# Patient Record
Sex: MALE | Race: BLACK OR AFRICAN AMERICAN | NOT HISPANIC OR LATINO | URBAN - METROPOLITAN AREA
[De-identification: names, ages, dates, MRNs, and addresses within clinical notes are randomized per-mention and may not be internally consistent; named-entity substitution may affect disease eponyms.]

---

## 2021-12-10 ENCOUNTER — EMERGENCY (EMERGENCY)
Facility: HOSPITAL | Age: 66
LOS: 1 days | Discharge: HOME | End: 2021-12-10
Attending: EMERGENCY MEDICINE | Admitting: EMERGENCY MEDICINE
Payer: MEDICAID

## 2021-12-10 VITALS
OXYGEN SATURATION: 99 % | DIASTOLIC BLOOD PRESSURE: 84 MMHG | SYSTOLIC BLOOD PRESSURE: 127 MMHG | TEMPERATURE: 99 F | RESPIRATION RATE: 17 BRPM | HEART RATE: 82 BPM

## 2021-12-10 VITALS
RESPIRATION RATE: 16 BRPM | SYSTOLIC BLOOD PRESSURE: 107 MMHG | DIASTOLIC BLOOD PRESSURE: 66 MMHG | HEART RATE: 106 BPM | TEMPERATURE: 98 F

## 2021-12-10 LAB
ALBUMIN SERPL ELPH-MCNC: 2.8 G/DL — LOW (ref 3.3–5)
ALP SERPL-CCNC: 108 U/L — SIGNIFICANT CHANGE UP (ref 40–120)
ALT FLD-CCNC: 71 U/L — HIGH (ref 4–41)
ANION GAP SERPL CALC-SCNC: 9 MMOL/L — SIGNIFICANT CHANGE UP (ref 7–14)
APPEARANCE UR: ABNORMAL
AST SERPL-CCNC: 27 U/L — SIGNIFICANT CHANGE UP (ref 4–40)
BACTERIA # UR AUTO: ABNORMAL
BASE EXCESS BLDV CALC-SCNC: 4 MMOL/L — HIGH (ref -2–3)
BASOPHILS # BLD AUTO: 0.01 K/UL — SIGNIFICANT CHANGE UP (ref 0–0.2)
BASOPHILS NFR BLD AUTO: 0.1 % — SIGNIFICANT CHANGE UP (ref 0–2)
BILIRUB SERPL-MCNC: 0.4 MG/DL — SIGNIFICANT CHANGE UP (ref 0.2–1.2)
BILIRUB UR-MCNC: NEGATIVE — SIGNIFICANT CHANGE UP
BLOOD GAS VENOUS COMPREHENSIVE RESULT: SIGNIFICANT CHANGE UP
BUN SERPL-MCNC: 19 MG/DL — SIGNIFICANT CHANGE UP (ref 7–23)
CALCIUM SERPL-MCNC: 8.8 MG/DL — SIGNIFICANT CHANGE UP (ref 8.4–10.5)
CHLORIDE BLDV-SCNC: 104 MMOL/L — SIGNIFICANT CHANGE UP (ref 96–108)
CHLORIDE SERPL-SCNC: 102 MMOL/L — SIGNIFICANT CHANGE UP (ref 98–107)
CO2 BLDV-SCNC: 33.6 MMOL/L — HIGH (ref 22–26)
CO2 SERPL-SCNC: 29 MMOL/L — SIGNIFICANT CHANGE UP (ref 22–31)
COLOR SPEC: YELLOW — SIGNIFICANT CHANGE UP
CREAT SERPL-MCNC: 0.73 MG/DL — SIGNIFICANT CHANGE UP (ref 0.5–1.3)
DIFF PNL FLD: NEGATIVE — SIGNIFICANT CHANGE UP
EOSINOPHIL # BLD AUTO: 0.25 K/UL — SIGNIFICANT CHANGE UP (ref 0–0.5)
EOSINOPHIL NFR BLD AUTO: 2 % — SIGNIFICANT CHANGE UP (ref 0–6)
GAS PNL BLDV: 137 MMOL/L — SIGNIFICANT CHANGE UP (ref 136–145)
GLUCOSE BLDV-MCNC: 154 MG/DL — HIGH (ref 70–99)
GLUCOSE SERPL-MCNC: 159 MG/DL — HIGH (ref 70–99)
GLUCOSE UR QL: ABNORMAL
HCO3 BLDV-SCNC: 32 MMOL/L — HIGH (ref 22–29)
HCT VFR BLD CALC: 36.8 % — LOW (ref 39–50)
HCT VFR BLDA CALC: 35 % — LOW (ref 39–51)
HGB BLD CALC-MCNC: 11.5 G/DL — LOW (ref 13–17)
HGB BLD-MCNC: 11.7 G/DL — LOW (ref 13–17)
IANC: 9.63 K/UL — HIGH (ref 1.5–8.5)
IMM GRANULOCYTES NFR BLD AUTO: 0.5 % — SIGNIFICANT CHANGE UP (ref 0–1.5)
KETONES UR-MCNC: NEGATIVE — SIGNIFICANT CHANGE UP
LACTATE BLDV-MCNC: 3.4 MMOL/L — HIGH (ref 0.5–2)
LEUKOCYTE ESTERASE UR-ACNC: ABNORMAL
LYMPHOCYTES # BLD AUTO: 1.3 K/UL — SIGNIFICANT CHANGE UP (ref 1–3.3)
LYMPHOCYTES # BLD AUTO: 10.6 % — LOW (ref 13–44)
MCHC RBC-ENTMCNC: 29.7 PG — SIGNIFICANT CHANGE UP (ref 27–34)
MCHC RBC-ENTMCNC: 31.8 GM/DL — LOW (ref 32–36)
MCV RBC AUTO: 93.4 FL — SIGNIFICANT CHANGE UP (ref 80–100)
MONOCYTES # BLD AUTO: 1.04 K/UL — HIGH (ref 0–0.9)
MONOCYTES NFR BLD AUTO: 8.5 % — SIGNIFICANT CHANGE UP (ref 2–14)
NEUTROPHILS # BLD AUTO: 9.63 K/UL — HIGH (ref 1.8–7.4)
NEUTROPHILS NFR BLD AUTO: 78.3 % — HIGH (ref 43–77)
NITRITE UR-MCNC: NEGATIVE — SIGNIFICANT CHANGE UP
NRBC # BLD: 0 /100 WBCS — SIGNIFICANT CHANGE UP
NRBC # FLD: 0 K/UL — SIGNIFICANT CHANGE UP
PCO2 BLDV: 63 MMHG — HIGH (ref 42–55)
PH BLDV: 7.31 — LOW (ref 7.32–7.43)
PH UR: 7 — SIGNIFICANT CHANGE UP (ref 5–8)
PLATELET # BLD AUTO: 280 K/UL — SIGNIFICANT CHANGE UP (ref 150–400)
PO2 BLDV: 23 MMHG — SIGNIFICANT CHANGE UP
POTASSIUM BLDV-SCNC: 4.4 MMOL/L — SIGNIFICANT CHANGE UP (ref 3.5–5.1)
POTASSIUM SERPL-MCNC: 4.9 MMOL/L — SIGNIFICANT CHANGE UP (ref 3.5–5.3)
POTASSIUM SERPL-SCNC: 4.9 MMOL/L — SIGNIFICANT CHANGE UP (ref 3.5–5.3)
PROT SERPL-MCNC: 6.5 G/DL — SIGNIFICANT CHANGE UP (ref 6–8.3)
PROT UR-MCNC: ABNORMAL
RBC # BLD: 3.94 M/UL — LOW (ref 4.2–5.8)
RBC # FLD: 15.4 % — HIGH (ref 10.3–14.5)
RBC CASTS # UR COMP ASSIST: SIGNIFICANT CHANGE UP /HPF (ref 0–4)
SAO2 % BLDV: 30.1 % — SIGNIFICANT CHANGE UP
SODIUM SERPL-SCNC: 140 MMOL/L — SIGNIFICANT CHANGE UP (ref 135–145)
SP GR SPEC: 1.02 — SIGNIFICANT CHANGE UP (ref 1–1.05)
UROBILINOGEN FLD QL: ABNORMAL
WBC # BLD: 12.29 K/UL — HIGH (ref 3.8–10.5)
WBC # FLD AUTO: 12.29 K/UL — HIGH (ref 3.8–10.5)
WBC UR QL: >50 /HPF — SIGNIFICANT CHANGE UP (ref 0–5)

## 2021-12-10 PROCEDURE — 99285 EMERGENCY DEPT VISIT HI MDM: CPT

## 2021-12-10 RX ORDER — SODIUM CHLORIDE 9 MG/ML
1000 INJECTION INTRAMUSCULAR; INTRAVENOUS; SUBCUTANEOUS ONCE
Refills: 0 | Status: COMPLETED | OUTPATIENT
Start: 2021-12-10 | End: 2021-12-10

## 2021-12-10 RX ORDER — ACETAMINOPHEN 500 MG
975 TABLET ORAL ONCE
Refills: 0 | Status: COMPLETED | OUTPATIENT
Start: 2021-12-10 | End: 2021-12-10

## 2021-12-10 RX ORDER — CEFPODOXIME PROXETIL 100 MG
1 TABLET ORAL
Qty: 20 | Refills: 0
Start: 2021-12-10 | End: 2021-12-19

## 2021-12-10 RX ORDER — KETOROLAC TROMETHAMINE 30 MG/ML
15 SYRINGE (ML) INJECTION ONCE
Refills: 0 | Status: DISCONTINUED | OUTPATIENT
Start: 2021-12-10 | End: 2021-12-10

## 2021-12-10 RX ORDER — OXYCODONE HYDROCHLORIDE 5 MG/1
5 TABLET ORAL ONCE
Refills: 0 | Status: DISCONTINUED | OUTPATIENT
Start: 2021-12-10 | End: 2021-12-10

## 2021-12-10 RX ORDER — CEFTRIAXONE 500 MG/1
1000 INJECTION, POWDER, FOR SOLUTION INTRAMUSCULAR; INTRAVENOUS ONCE
Refills: 0 | Status: COMPLETED | OUTPATIENT
Start: 2021-12-10 | End: 2021-12-10

## 2021-12-10 RX ORDER — MORPHINE SULFATE 50 MG/1
4 CAPSULE, EXTENDED RELEASE ORAL ONCE
Refills: 0 | Status: DISCONTINUED | OUTPATIENT
Start: 2021-12-10 | End: 2021-12-10

## 2021-12-10 RX ADMIN — MORPHINE SULFATE 4 MILLIGRAM(S): 50 CAPSULE, EXTENDED RELEASE ORAL at 12:10

## 2021-12-10 RX ADMIN — SODIUM CHLORIDE 1000 MILLILITER(S): 9 INJECTION INTRAMUSCULAR; INTRAVENOUS; SUBCUTANEOUS at 11:29

## 2021-12-10 RX ADMIN — Medication 975 MILLIGRAM(S): at 12:50

## 2021-12-10 RX ADMIN — Medication 15 MILLIGRAM(S): at 14:07

## 2021-12-10 RX ADMIN — OXYCODONE HYDROCHLORIDE 5 MILLIGRAM(S): 5 TABLET ORAL at 12:50

## 2021-12-10 RX ADMIN — CEFTRIAXONE 100 MILLIGRAM(S): 500 INJECTION, POWDER, FOR SOLUTION INTRAMUSCULAR; INTRAVENOUS at 14:07

## 2021-12-10 RX ADMIN — MORPHINE SULFATE 4 MILLIGRAM(S): 50 CAPSULE, EXTENDED RELEASE ORAL at 11:27

## 2021-12-10 NOTE — ED PROVIDER NOTE - CLINICAL SUMMARY MEDICAL DECISION MAKING FREE TEXT BOX
Shilpa: Flew in from [facility] last night. 1 month pain in neck and general weakness, constipation. Had MRIs (doesn't know result). No discrete neuro deficits or bowel/bladder incontinence. No cancer/fever/IVDA/trauma. Check labs. Get copies of MRI.

## 2021-12-10 NOTE — ED ADULT NURSE REASSESSMENT NOTE - NS ED NURSE REASSESS COMMENT FT1
pt given enema for constipation. pt felt urge to have BM, however no stool passed. no stool visible in rectum. Pt reports pain decreased from 10/10 to 8/10. FRANK Kaba made aware. urine and blood cultures collected and sent. will reassess.
pt reports relief of pain after small BM. due medications given. Pt ready for d/c. awaiting family p/u

## 2021-12-10 NOTE — ED ADULT TRIAGE NOTE - CHIEF COMPLAINT QUOTE
from [facility] 10 pm last pm. states not feeling well x past mo. increased back pains,decreased appetite,problems urinating,defecating. denies ch pains,duff breathing.  reports " feels tired with no energy"  fs 121

## 2021-12-10 NOTE — ED PROVIDER NOTE - OBJECTIVE STATEMENT
67 y/o male no pmh c/o neck and back pain x1 month. Pt states that he has chronic back pain but it has been much worse over the last 1 month. Pt admits to b/l LE paresthesias which are intermittent. Pt also c/o constipation over the last 1.5 weeks. Pt admits to taking aleve with no relief of pain. Denies recent fall, injury or trauma. Pt also admits to generalized weakness and decreased appetites. Denies chest pain, sob, abd pain, n/v/d, dysuria, dizziness, syncope, bowel or bladder incontinence, fever or chills. of note, pt travelled from [facility] last night. Pt had MRIs of his spine 1 month ago in [facility].

## 2021-12-10 NOTE — ED ADULT NURSE NOTE - OBJECTIVE STATEMENT
Pt received in RM 16. Pt A/O x3. Ambulatory with assistance at baseline. Pt c/o pain that starts from the neck to the lower back. Pt states pain has been persistent over the years following an accident. Pt reports that starting from November pain has increased to the point where he is unable to walk and has whole body weakness.   Pt states he has not had a bowel movement for 1 week. Pt denies abdominal pain. Abdomen nondistended and nontender on palpation. Active bowel sounds in all four quadrants. Pt states decreased urination. Pt voided in urinal. Urine noted to be tiny. Denies pain while urinating. Pt reports recent weight loss and decreased appetite. Respirations even and unlabored. Breath sounds clear bilaterally. Normal sinus on cardiac monitor. Skin intact. Pt warm to touch, noted to be febrile rectally, FRANK Kaba made aware. Pt reports recent diagnosis of HTN and Diabetes, type unknown. 20 IVG placed in RAC, Labs drawn and sent, Vital signs reassessed as noted. Medications given as ordered. Bed at the lowest position and call bell within reach.

## 2021-12-10 NOTE — ED ADULT NURSE REASSESSMENT NOTE - PATIENT ON (OXYGEN DELIVERY METHOD)
Patient called and said she has a procedure scheduled tomorrow with Dr. Barakat and she is too sick to go and will need to cancel it.  Patient also stated that she is unsure of what she is having done and would like someone to call her to explain the procedure to her. Please advise.   
room air
r/room air

## 2021-12-10 NOTE — ED PROVIDER NOTE - ATTENDING CONTRIBUTION TO CARE
I performed a face-to-face evaluation of the patient and performed a history and physical examination. I agree with the history and physical examination.    Flew in from [facility] last night. 1 month pain in neck and general weakness, constipation. Had MRIs (doesn't know result). No discrete neuro deficits or bowel/bladder incontinence. No cancer/fever/IVDA/trauma. Check labs. Get copies of MRI.

## 2021-12-11 LAB
GRAM STN FLD: SIGNIFICANT CHANGE UP
METHOD TYPE: SIGNIFICANT CHANGE UP
MSSA DNA SPEC QL NAA+PROBE: SIGNIFICANT CHANGE UP
SPECIMEN SOURCE: SIGNIFICANT CHANGE UP

## 2021-12-11 NOTE — ED POST DISCHARGE NOTE - DETAILS
spoke to patient regarding results. states he is feeling well, not having any fevers or other focal symptoms. discussed that culture result may have been contaminant, but that he should call back or return if he has any new or worsening symptoms

## 2021-12-12 ENCOUNTER — INPATIENT (INPATIENT)
Facility: HOSPITAL | Age: 66
LOS: 1 days | Discharge: ANOTHER HOSPITAL | End: 2021-12-14
Attending: HOSPITALIST | Admitting: HOSPITALIST
Payer: MEDICAID

## 2021-12-12 VITALS
SYSTOLIC BLOOD PRESSURE: 110 MMHG | DIASTOLIC BLOOD PRESSURE: 79 MMHG | TEMPERATURE: 98 F | RESPIRATION RATE: 16 BRPM | HEART RATE: 107 BPM | OXYGEN SATURATION: 100 %

## 2021-12-12 DIAGNOSIS — M86.10 OTHER ACUTE OSTEOMYELITIS, UNSPECIFIED SITE: ICD-10-CM

## 2021-12-12 DIAGNOSIS — Z29.9 ENCOUNTER FOR PROPHYLACTIC MEASURES, UNSPECIFIED: ICD-10-CM

## 2021-12-12 DIAGNOSIS — M54.9 DORSALGIA, UNSPECIFIED: ICD-10-CM

## 2021-12-12 DIAGNOSIS — Z98.890 OTHER SPECIFIED POSTPROCEDURAL STATES: Chronic | ICD-10-CM

## 2021-12-12 DIAGNOSIS — K68.12 PSOAS MUSCLE ABSCESS: ICD-10-CM

## 2021-12-12 DIAGNOSIS — R78.81 BACTEREMIA: ICD-10-CM

## 2021-12-12 DIAGNOSIS — N39.0 URINARY TRACT INFECTION, SITE NOT SPECIFIED: ICD-10-CM

## 2021-12-12 DIAGNOSIS — E11.9 TYPE 2 DIABETES MELLITUS WITHOUT COMPLICATIONS: ICD-10-CM

## 2021-12-12 LAB
ALBUMIN SERPL ELPH-MCNC: 2.7 G/DL — LOW (ref 3.3–5)
ALP SERPL-CCNC: 107 U/L — SIGNIFICANT CHANGE UP (ref 40–120)
ALT FLD-CCNC: 46 U/L — HIGH (ref 4–41)
ANION GAP SERPL CALC-SCNC: 9 MMOL/L — SIGNIFICANT CHANGE UP (ref 7–14)
AST SERPL-CCNC: 19 U/L — SIGNIFICANT CHANGE UP (ref 4–40)
BASE EXCESS BLDV CALC-SCNC: 0.4 MMOL/L — SIGNIFICANT CHANGE UP (ref -2–3)
BASE EXCESS BLDV CALC-SCNC: 1.1 MMOL/L — SIGNIFICANT CHANGE UP (ref -2–3)
BASOPHILS # BLD AUTO: 0.01 K/UL — SIGNIFICANT CHANGE UP (ref 0–0.2)
BASOPHILS NFR BLD AUTO: 0.1 % — SIGNIFICANT CHANGE UP (ref 0–2)
BILIRUB SERPL-MCNC: 0.4 MG/DL — SIGNIFICANT CHANGE UP (ref 0.2–1.2)
BLOOD GAS VENOUS COMPREHENSIVE RESULT: SIGNIFICANT CHANGE UP
BLOOD GAS VENOUS COMPREHENSIVE RESULT: SIGNIFICANT CHANGE UP
BUN SERPL-MCNC: 10 MG/DL — SIGNIFICANT CHANGE UP (ref 7–23)
CALCIUM SERPL-MCNC: 8.1 MG/DL — LOW (ref 8.4–10.5)
CHLORIDE BLDV-SCNC: 103 MMOL/L — SIGNIFICANT CHANGE UP (ref 96–108)
CHLORIDE BLDV-SCNC: 106 MMOL/L — SIGNIFICANT CHANGE UP (ref 96–108)
CHLORIDE SERPL-SCNC: 103 MMOL/L — SIGNIFICANT CHANGE UP (ref 98–107)
CO2 BLDV-SCNC: 28.5 MMOL/L — HIGH (ref 22–26)
CO2 BLDV-SCNC: 29.5 MMOL/L — HIGH (ref 22–26)
CO2 SERPL-SCNC: 26 MMOL/L — SIGNIFICANT CHANGE UP (ref 22–31)
CREAT SERPL-MCNC: 0.7 MG/DL — SIGNIFICANT CHANGE UP (ref 0.5–1.3)
CRP SERPL-MCNC: 105.9 MG/L — HIGH
EOSINOPHIL # BLD AUTO: 0.1 K/UL — SIGNIFICANT CHANGE UP (ref 0–0.5)
EOSINOPHIL NFR BLD AUTO: 1.2 % — SIGNIFICANT CHANGE UP (ref 0–6)
ERYTHROCYTE [SEDIMENTATION RATE] IN BLOOD: 34 MM/HR — HIGH (ref 1–15)
GAS PNL BLDV: 134 MMOL/L — LOW (ref 136–145)
GAS PNL BLDV: 136 MMOL/L — SIGNIFICANT CHANGE UP (ref 136–145)
GAS PNL BLDV: SIGNIFICANT CHANGE UP
GLUCOSE BLDC GLUCOMTR-MCNC: 73 MG/DL — SIGNIFICANT CHANGE UP (ref 70–99)
GLUCOSE BLDV-MCNC: 168 MG/DL — HIGH (ref 70–99)
GLUCOSE BLDV-MCNC: 83 MG/DL — SIGNIFICANT CHANGE UP (ref 70–99)
GLUCOSE SERPL-MCNC: 181 MG/DL — HIGH (ref 70–99)
GRAM STN FLD: SIGNIFICANT CHANGE UP
HCO3 BLDV-SCNC: 27 MMOL/L — SIGNIFICANT CHANGE UP (ref 22–29)
HCO3 BLDV-SCNC: 28 MMOL/L — SIGNIFICANT CHANGE UP (ref 22–29)
HCT VFR BLD CALC: 31.5 % — LOW (ref 39–50)
HCT VFR BLDA CALC: 31 % — LOW (ref 39–51)
HCT VFR BLDA CALC: 37 % — LOW (ref 39–51)
HGB BLD CALC-MCNC: 10.4 G/DL — LOW (ref 13–17)
HGB BLD CALC-MCNC: 12.2 G/DL — LOW (ref 13–17)
HGB BLD-MCNC: 10.6 G/DL — LOW (ref 13–17)
IANC: 6.01 K/UL — SIGNIFICANT CHANGE UP (ref 1.5–8.5)
IMM GRANULOCYTES NFR BLD AUTO: 0.4 % — SIGNIFICANT CHANGE UP (ref 0–1.5)
LACTATE BLDV-MCNC: 1.4 MMOL/L — SIGNIFICANT CHANGE UP (ref 0.5–2)
LACTATE BLDV-MCNC: 2.5 MMOL/L — HIGH (ref 0.5–2)
LYMPHOCYTES # BLD AUTO: 1.31 K/UL — SIGNIFICANT CHANGE UP (ref 1–3.3)
LYMPHOCYTES # BLD AUTO: 16.2 % — SIGNIFICANT CHANGE UP (ref 13–44)
MCHC RBC-ENTMCNC: 30.3 PG — SIGNIFICANT CHANGE UP (ref 27–34)
MCHC RBC-ENTMCNC: 33.7 GM/DL — SIGNIFICANT CHANGE UP (ref 32–36)
MCV RBC AUTO: 90 FL — SIGNIFICANT CHANGE UP (ref 80–100)
MONOCYTES # BLD AUTO: 0.61 K/UL — SIGNIFICANT CHANGE UP (ref 0–0.9)
MONOCYTES NFR BLD AUTO: 7.6 % — SIGNIFICANT CHANGE UP (ref 2–14)
NEUTROPHILS # BLD AUTO: 6.01 K/UL — SIGNIFICANT CHANGE UP (ref 1.8–7.4)
NEUTROPHILS NFR BLD AUTO: 74.5 % — SIGNIFICANT CHANGE UP (ref 43–77)
NRBC # BLD: 0 /100 WBCS — SIGNIFICANT CHANGE UP
NRBC # FLD: 0 K/UL — SIGNIFICANT CHANGE UP
PCO2 BLDV: 51 MMHG — SIGNIFICANT CHANGE UP (ref 42–55)
PCO2 BLDV: 53 MMHG — SIGNIFICANT CHANGE UP (ref 42–55)
PH BLDV: 7.33 — SIGNIFICANT CHANGE UP (ref 7.32–7.43)
PH BLDV: 7.33 — SIGNIFICANT CHANGE UP (ref 7.32–7.43)
PLATELET # BLD AUTO: 277 K/UL — SIGNIFICANT CHANGE UP (ref 150–400)
PO2 BLDV: 23 MMHG — SIGNIFICANT CHANGE UP
PO2 BLDV: 28 MMHG — SIGNIFICANT CHANGE UP
POTASSIUM BLDV-SCNC: 3.3 MMOL/L — LOW (ref 3.5–5.1)
POTASSIUM BLDV-SCNC: 3.8 MMOL/L — SIGNIFICANT CHANGE UP (ref 3.5–5.1)
POTASSIUM SERPL-MCNC: 3.6 MMOL/L — SIGNIFICANT CHANGE UP (ref 3.5–5.3)
POTASSIUM SERPL-SCNC: 3.6 MMOL/L — SIGNIFICANT CHANGE UP (ref 3.5–5.3)
PROT SERPL-MCNC: 6.2 G/DL — SIGNIFICANT CHANGE UP (ref 6–8.3)
RBC # BLD: 3.5 M/UL — LOW (ref 4.2–5.8)
RBC # FLD: 15.1 % — HIGH (ref 10.3–14.5)
SAO2 % BLDV: 28.4 % — SIGNIFICANT CHANGE UP
SAO2 % BLDV: 40.7 % — SIGNIFICANT CHANGE UP
SODIUM SERPL-SCNC: 138 MMOL/L — SIGNIFICANT CHANGE UP (ref 135–145)
WBC # BLD: 8.07 K/UL — SIGNIFICANT CHANGE UP (ref 3.8–10.5)
WBC # FLD AUTO: 8.07 K/UL — SIGNIFICANT CHANGE UP (ref 3.8–10.5)

## 2021-12-12 PROCEDURE — 99285 EMERGENCY DEPT VISIT HI MDM: CPT

## 2021-12-12 PROCEDURE — 72142 MRI NECK SPINE W/DYE: CPT | Mod: 26

## 2021-12-12 PROCEDURE — 99223 1ST HOSP IP/OBS HIGH 75: CPT | Mod: GC

## 2021-12-12 PROCEDURE — 74177 CT ABD & PELVIS W/CONTRAST: CPT | Mod: 26,MA

## 2021-12-12 PROCEDURE — 72149 MRI LUMBAR SPINE W/DYE: CPT | Mod: 26

## 2021-12-12 PROCEDURE — 72147 MRI CHEST SPINE W/DYE: CPT | Mod: 26

## 2021-12-12 RX ORDER — GLUCAGON INJECTION, SOLUTION 0.5 MG/.1ML
1 INJECTION, SOLUTION SUBCUTANEOUS ONCE
Refills: 0 | Status: DISCONTINUED | OUTPATIENT
Start: 2021-12-12 | End: 2021-12-13

## 2021-12-12 RX ORDER — DEXTROSE 50 % IN WATER 50 %
12.5 SYRINGE (ML) INTRAVENOUS ONCE
Refills: 0 | Status: DISCONTINUED | OUTPATIENT
Start: 2021-12-12 | End: 2021-12-13

## 2021-12-12 RX ORDER — ACETAMINOPHEN 500 MG
650 TABLET ORAL EVERY 6 HOURS
Refills: 0 | Status: DISCONTINUED | OUTPATIENT
Start: 2021-12-12 | End: 2021-12-14

## 2021-12-12 RX ORDER — DEXTROSE 50 % IN WATER 50 %
25 SYRINGE (ML) INTRAVENOUS ONCE
Refills: 0 | Status: DISCONTINUED | OUTPATIENT
Start: 2021-12-12 | End: 2021-12-13

## 2021-12-12 RX ORDER — MORPHINE SULFATE 50 MG/1
4 CAPSULE, EXTENDED RELEASE ORAL ONCE
Refills: 0 | Status: DISCONTINUED | OUTPATIENT
Start: 2021-12-12 | End: 2021-12-12

## 2021-12-12 RX ORDER — PIPERACILLIN AND TAZOBACTAM 4; .5 G/20ML; G/20ML
3.38 INJECTION, POWDER, LYOPHILIZED, FOR SOLUTION INTRAVENOUS ONCE
Refills: 0 | Status: COMPLETED | OUTPATIENT
Start: 2021-12-12 | End: 2021-12-12

## 2021-12-12 RX ORDER — DEXTROSE 50 % IN WATER 50 %
15 SYRINGE (ML) INTRAVENOUS ONCE
Refills: 0 | Status: DISCONTINUED | OUTPATIENT
Start: 2021-12-12 | End: 2021-12-13

## 2021-12-12 RX ORDER — INSULIN LISPRO 100/ML
VIAL (ML) SUBCUTANEOUS EVERY 6 HOURS
Refills: 0 | Status: DISCONTINUED | OUTPATIENT
Start: 2021-12-12 | End: 2021-12-13

## 2021-12-12 RX ORDER — SODIUM CHLORIDE 9 MG/ML
1000 INJECTION, SOLUTION INTRAVENOUS
Refills: 0 | Status: DISCONTINUED | OUTPATIENT
Start: 2021-12-12 | End: 2021-12-13

## 2021-12-12 RX ORDER — OXYCODONE AND ACETAMINOPHEN 5; 325 MG/1; MG/1
1 TABLET ORAL EVERY 6 HOURS
Refills: 0 | Status: DISCONTINUED | OUTPATIENT
Start: 2021-12-12 | End: 2021-12-13

## 2021-12-12 RX ORDER — OXYCODONE AND ACETAMINOPHEN 5; 325 MG/1; MG/1
2 TABLET ORAL EVERY 6 HOURS
Refills: 0 | Status: DISCONTINUED | OUTPATIENT
Start: 2021-12-12 | End: 2021-12-13

## 2021-12-12 RX ORDER — ONDANSETRON 8 MG/1
4 TABLET, FILM COATED ORAL EVERY 8 HOURS
Refills: 0 | Status: DISCONTINUED | OUTPATIENT
Start: 2021-12-12 | End: 2021-12-14

## 2021-12-12 RX ORDER — VANCOMYCIN HCL 1 G
1000 VIAL (EA) INTRAVENOUS ONCE
Refills: 0 | Status: COMPLETED | OUTPATIENT
Start: 2021-12-12 | End: 2021-12-12

## 2021-12-12 RX ORDER — LANOLIN ALCOHOL/MO/W.PET/CERES
3 CREAM (GRAM) TOPICAL AT BEDTIME
Refills: 0 | Status: DISCONTINUED | OUTPATIENT
Start: 2021-12-12 | End: 2021-12-14

## 2021-12-12 RX ORDER — ENOXAPARIN SODIUM 100 MG/ML
40 INJECTION SUBCUTANEOUS DAILY
Refills: 0 | Status: DISCONTINUED | OUTPATIENT
Start: 2021-12-12 | End: 2021-12-13

## 2021-12-12 RX ORDER — VANCOMYCIN HCL 1 G
1000 VIAL (EA) INTRAVENOUS EVERY 12 HOURS
Refills: 0 | Status: DISCONTINUED | OUTPATIENT
Start: 2021-12-13 | End: 2021-12-13

## 2021-12-12 RX ORDER — PIPERACILLIN AND TAZOBACTAM 4; .5 G/20ML; G/20ML
3.38 INJECTION, POWDER, LYOPHILIZED, FOR SOLUTION INTRAVENOUS EVERY 8 HOURS
Refills: 0 | Status: DISCONTINUED | OUTPATIENT
Start: 2021-12-12 | End: 2021-12-13

## 2021-12-12 RX ORDER — SODIUM CHLORIDE 9 MG/ML
1000 INJECTION INTRAMUSCULAR; INTRAVENOUS; SUBCUTANEOUS ONCE
Refills: 0 | Status: COMPLETED | OUTPATIENT
Start: 2021-12-12 | End: 2021-12-12

## 2021-12-12 RX ADMIN — MORPHINE SULFATE 4 MILLIGRAM(S): 50 CAPSULE, EXTENDED RELEASE ORAL at 17:25

## 2021-12-12 RX ADMIN — MORPHINE SULFATE 4 MILLIGRAM(S): 50 CAPSULE, EXTENDED RELEASE ORAL at 13:45

## 2021-12-12 RX ADMIN — PIPERACILLIN AND TAZOBACTAM 200 GRAM(S): 4; .5 INJECTION, POWDER, LYOPHILIZED, FOR SOLUTION INTRAVENOUS at 15:46

## 2021-12-12 RX ADMIN — Medication 250 MILLIGRAM(S): at 13:28

## 2021-12-12 RX ADMIN — Medication 1000 MILLIGRAM(S): at 16:59

## 2021-12-12 RX ADMIN — SODIUM CHLORIDE 1000 MILLILITER(S): 9 INJECTION INTRAMUSCULAR; INTRAVENOUS; SUBCUTANEOUS at 16:52

## 2021-12-12 RX ADMIN — PIPERACILLIN AND TAZOBACTAM 3.38 GRAM(S): 4; .5 INJECTION, POWDER, LYOPHILIZED, FOR SOLUTION INTRAVENOUS at 16:19

## 2021-12-12 RX ADMIN — PIPERACILLIN AND TAZOBACTAM 25 GRAM(S): 4; .5 INJECTION, POWDER, LYOPHILIZED, FOR SOLUTION INTRAVENOUS at 22:40

## 2021-12-12 RX ADMIN — MORPHINE SULFATE 4 MILLIGRAM(S): 50 CAPSULE, EXTENDED RELEASE ORAL at 13:28

## 2021-12-12 RX ADMIN — MORPHINE SULFATE 4 MILLIGRAM(S): 50 CAPSULE, EXTENDED RELEASE ORAL at 17:05

## 2021-12-12 RX ADMIN — SODIUM CHLORIDE 1000 MILLILITER(S): 9 INJECTION INTRAMUSCULAR; INTRAVENOUS; SUBCUTANEOUS at 15:46

## 2021-12-12 NOTE — H&P ADULT - NSHPSOCIALHISTORY_GEN_ALL_CORE
Recently visited from [facility], living with sister currently. Actively smoking 1-2 cigs. Occasional EtOH. No history of drugs use. Recently visited from [facility], living with sister currently.   Actively smoking 1-2 cigs.   Occasional EtOH.   No history of drugs use.

## 2021-12-12 NOTE — H&P ADULT - PROBLEM SELECTOR PLAN 5
Lovenox -A1C  -ISS Glucosuria, uncontrolled diabetes.   -A1C  -ISS Glucosuria (500), uncontrolled diabetes.   -A1C  -ISS Glucosuria (500), uncontrolled diabetes.  Aware of diagnosis, but not on medications  -A1C  -ISS  -Consistent carb diet when PO nutrition starts

## 2021-12-12 NOTE — ED PROVIDER NOTE - ATTENDING CONTRIBUTION TO CARE
I performed a face-to-face evaluation of the patient and performed a history and physical examination. I agree with the history and physical examination.    Arrived from [facility] a few days ago. 1 month back pain. Had MRI showing multiple levels of disc herniation. Subjective F and parasthesias legs. No perianal sensation. No PMH. Awoke with this 1 month ago. Has appt. w/ Spine tomorrow. Had F at his visit here a few days ago. UA a few days ago c/w UTI. BCx grew S. aureus. No midline tenderness. 4/5 strength L leg w/ positive SLR that side. Strong rectal tone. Concern for epidural abscess or magdi-renal abscess. MRI C/T/L spine. CT abd. IVF. Zosyn/Vanc. Pain meds. I performed a face-to-face evaluation of the patient and performed a history and physical examination. I agree with the history and physical examination.    Arrived from [facility] a few days ago. 1 month back pain. Had MRI showing multiple levels of disc herniation. Subjective F and parasthesias legs. No perianal sensation. No PMH. Awoke with this 1 month ago. Has appt. w/ Spine tomorrow. Had F at his visit here a few days ago. UA a few days ago c/w UTI. BCx grew S. aureus. No midline tenderness. 4/5 strength L leg w/ positive SLR that side. Strong rectal tone. Concern for epidural abscess or magdi-renal pr psoas abscess. MRI C/T/L spine. CT abd. IVF. Zosyn/Vanc. Pain meds.

## 2021-12-12 NOTE — H&P ADULT - NSHPPHYSICALEXAM_GEN_ALL_CORE
T(C): 37.1 (12-12-21 @ 19:25), Max: 37.1 (12-12-21 @ 19:25)  HR: 69 (12-12-21 @ 19:25) (69 - 107)  BP: 137/77 (12-12-21 @ 19:25) (110/79 - 151/82)  RR: 16 (12-12-21 @ 19:25) (16 - 16)  SpO2: 100% (12-12-21 @ 19:25) (100% - 100%)  Wt(kg): --  GENERAL: NAD, well-developed  HEAD:  Atraumatic, Normocephalic  EYES: EOMI, PERRLA, conjunctiva and sclera clear  NECK: Supple, No JVD  CHEST/LUNG: Clear to auscultation bilaterally; No wheeze  HEART: Regular rate and rhythm; No murmurs, rubs, or gallops  ABDOMEN: Soft, Nontender, Nondistended; Bowel sounds present  EXTREMITIES:  2+ Peripheral Pulses, No clubbing, cyanosis, or edema  PSYCH: AAOx3  NEUROLOGY: pain on palpation on lumbar spine area. Motor strength 4/5 in LEs. Positive straight leg test   SKIN: No rashes or lesions T(C): 37.1 (12-12-21 @ 19:25), Max: 37.1 (12-12-21 @ 19:25)  HR: 69 (12-12-21 @ 19:25) (69 - 107)  BP: 137/77 (12-12-21 @ 19:25) (110/79 - 151/82)  RR: 16 (12-12-21 @ 19:25) (16 - 16)  SpO2: 100% (12-12-21 @ 19:25) (100% - 100%)  Wt(kg): --  GENERAL: NAD, well-developed  HEAD:  Atraumatic, Normocephalic  EYES: EOMI, PERRLA, conjunctiva and sclera clear  NECK: Supple, No JVD  CHEST/LUNG: Clear to auscultation bilaterally; No wheeze  HEART: Regular rate and rhythm; No murmurs, rubs, or gallops  ABDOMEN: Soft, Nontender, Nondistended; Bowel sounds present  EXTREMITIES:  2+ Peripheral Pulses, No clubbing, cyanosis, or edema. No suprapubic or CVA tenderness.   PSYCH: AAOx3  NEUROLOGY: pain on palpation on lumbar spine area. Motor strength 4/5 in LEs. Positive straight leg test   SKIN: No rashes or lesions T(C): 37.1 (12-12-21 @ 19:25), Max: 37.1 (12-12-21 @ 19:25)  HR: 69 (12-12-21 @ 19:25) (69 - 107)  BP: 137/77 (12-12-21 @ 19:25) (110/79 - 151/82)  RR: 16 (12-12-21 @ 19:25) (16 - 16)  SpO2: 100% (12-12-21 @ 19:25) (100% - 100%)  Wt(kg): --  GENERAL: Well developed, ill appearing male.  NAD at the time of being seen  HEAD:  Atraumatic, Normocephalic  EYES: EOMI, PERRL, conjunctiva and sclera clear  NECK: Supple, No JVD  CHEST/LUNG: Clear to auscultation bilaterally; No wheeze  HEART: Regular rate and rhythm; No murmurs, rubs, or gallops  ABDOMEN: Soft, Nontender, Nondistended; Bowel sounds present  GENITOURINARY: No suprapubic or CVA tenderness.  EXTREMITIES:  2+ Peripheral Pulses, No clubbing, cyanosis, or edema.   PSYCH: AAOx3.  Mood and affect appropriate to situation  NEUROLOGY: pain on palpation on lumbar spine area. Motor strength 4/5 in LEs. Positive straight leg test   SKIN: No rashes or lesions T(C): 37.1 (12-12-21 @ 19:25), Max: 37.1 (12-12-21 @ 19:25)  HR: 69 (12-12-21 @ 19:25) (69 - 107)  BP: 137/77 (12-12-21 @ 19:25) (110/79 - 151/82)  RR: 16 (12-12-21 @ 19:25) (16 - 16)  SpO2: 100% (12-12-21 @ 19:25) (100% - 100%)  Wt(kg): --  GENERAL: Well developed, thin, ill/weak appearing male lying propped up in stretcher; appears to be experiencing some painful discomfort of the back  HEAD:  Atraumatic, Normocephalic  EYES: EOMI, PERRL, conjunctiva and sclera clear  NECK: Supple, No JVD  CHEST/LUNG: Clear to auscultation bilaterally; No wheeze  HEART: Regular rate and rhythm; No murmurs, rubs, or gallops  ABDOMEN: Soft, Nontender, Nondistended; Bowel sounds present  GENITOURINARY: No suprapubic or CVA tenderness.  EXTREMITIES:  2+ Peripheral Pulses, No clubbing, cyanosis, or edema.   PSYCH: AAOx3.  Appears a bit anxious, but overall mood and affect appropriate to situation  NEUROLOGY: pain on palpation on lumbar spine area. Motor strength 4/5 in LEs. Positive straight leg test   SKIN: No rashes or lesions

## 2021-12-12 NOTE — H&P ADULT - PROBLEM SELECTOR PLAN 3
CT abdomen showed L3-4 questionable erosion concerning for degenerative vs. discitis osteomyelitis.   -Vanc and Zosyn  -ID consult   -oxycodone for pain control CT abdomen showed L3-4 questionable erosion concerning for degenerative vs. discitis osteomyelitis.   -Vanc and Zosyn  -ID consult   -oxycodone for pain control  -MRI CT abdomen showed L3-4 questionable erosion concerning for degenerative vs. discitis osteomyelitis. MRI showed discitis/ osteomyelitis in C4/C5 with prevertebral/ phlegmon extending from C2/ C3 to C5/ C6 levels and discitis/ osteomylitis in L3/L4 level.   -Vanc and Zosyn  -ID consult   -percocet for pain control Blood Cx grew Staph aureus and Staph epidymidis. UCx grew E. coli.   -Vanc and Zosyn  -blood culture  -TTE  -ID consult

## 2021-12-12 NOTE — H&P ADULT - PROBLEM SELECTOR PLAN 6
Lovenox Aware of diagnosis PTA, but not on any medications for same  - current elevation may be impacted by stress from pain  - if persistently elevated, would initiate low dose tx

## 2021-12-12 NOTE — ED PROVIDER NOTE - CONSTITUTIONAL [+], MLM
How Severe Are Your Spot(S)?: moderate Have Your Spot(S) Been Treated In The Past?: has not been treated Hpi Title: Evaluation of Skin Lesions FEVER

## 2021-12-12 NOTE — H&P ADULT - PROBLEM SELECTOR PLAN 2
Blood Cx grew Staph aureus and Staph epidymidis. UCx grew E. coli.   -Vanc and Zosyn  -blood culture  -TTE  -ID consult CT abd pelvis showed L psoas abscess. MRI showed psoas abscess bilaterally. Bacteremia with staph aureus and staph epidermidis (though staph epidermidis is likely contaminant).   -surgery consult vs IR consult for drainage  -Vanc and Zosyn  -blood culture Presented with sever pain of the back  CT abd pelvis showed L psoas abscess. MRI showed psoas abscess bilaterally. Bacteremia with staph aureus and staph epidermidis (though staph epidermidis is likely contaminant).   -surgery consult vs IR consult for drainage  -Vanc and Zosyn  -blood culture Presented with severe pain of the back, inability to ambulate due to pain  CT abd pelvis showed L psoas abscess.  MRI showed psoas abscess bilaterally.  Bacteremia with Staph aureus and Staph epidermidis (though staph epidermidis is likely contaminant).  UC w/ E-coli  -surgery consult vs IR consult for drainage  -on Vanc and Zosyn  -blood culture  -analgesic PRN

## 2021-12-12 NOTE — ED PROVIDER NOTE - OBJECTIVE STATEMENT
66 year old male presenting with back pain. Pain has been present for 1 month. Patient woke up with the pain. Located in the entire spine, across the anterior pelvis, radiating down both legs, associated paresthesias in bilateral GRETA, subjective fevers, bilateral weakness, perineal numbness. Denies CP, SOB, abdominal pain, nausea, vomiting, trauma, injection drug use, injections into the back, bowel or bladder incontinence.

## 2021-12-12 NOTE — H&P ADULT - PROBLEM SELECTOR PLAN 4
UA with large LE. UCx grew Ecoli.  -Vanc and Zosyn  -f/u UCx sensitivitiy UA with large LE. UCx grew Ecoli.  -Enlarged prostate on CT  -Vanc and Zosyn  -f/u UCx sensitivity UA with large LE. UCx grew E-coli.  -Enlarged prostate on CT  -Vanc and Zosyn  -f/u UCx sensitivity UA with large LE.  UCx grew E-coli.  -Enlarged prostate on CT  -Vanc and Zosyn  -f/u UCx sensitivity UA turbid, with large LE, many bacteria, pH = 7.  UCx grew E-coli.  -Enlarged prostate on CT  -Vanc and Zosyn  -f/u UCx sensitivity

## 2021-12-12 NOTE — H&P ADULT - ASSESSMENT
67 yo M w/o pmh presented with back pain found to have L psoas abscess, Staph Aureus bacteremia and possible osteomyelitis.  [ x ]  Lab studies reviewed  [ x ]  Radiology reviewed  [ x ]  Old records reviewed    65 yo M w/o pmh presented with back pain found to have L psoas abscess, Staph Aureus bacteremia and possible osteomyelitis.  [ x ]  Lab studies reviewed  [ x ]  Radiology reviewed  [ x ]  Old records reviewed (recent ED visit chart)    67 yo M w/o pmh presented with back pain found to have L psoas abscess, Staph Aureus bacteremia and possible osteomyelitis.

## 2021-12-12 NOTE — ED PROVIDER NOTE - CLINICAL SUMMARY MEDICAL DECISION MAKING FREE TEXT BOX
Shilpa: Arrived from [facility] a few days ago. 1 month back pain. Had MRI showing multiple levels of disc herniation. Subjective F and parasthesias legs. No perianal sensation. No PMH. Awoke with this 1 month ago. Has appt. w/ Spine tomorrow. Had F at his visit here a few days ago. UA a few days ago c/w UTI. BCx grew S. aureus. No midline tenderness. 4/5 strength L leg w/ positive SLR that side. Strong rectal tone. Concern for epidural abscess or magdi-renal abscess. MRI C/T/L spine. CT abd. IVF. Zosyn/Vanc. Pain meds. Shilpa: Arrived from [facility] a few days ago. 1 month back pain. Had MRI showing multiple levels of disc herniation. Subjective F and parasthesias legs. No perianal sensation. No PMH. Awoke with this 1 month ago. Has appt. w/ Spine tomorrow. Had F at his visit here a few days ago. UA a few days ago c/w UTI. BCx grew S. aureus. No midline tenderness. 4/5 strength L leg w/ positive SLR that side. Strong rectal tone. Concern for epidural abscess or magdi-renal pr psoas abscess. MRI C/T/L spine. CT abd. IVF. Zosyn/Vanc. Pain meds.

## 2021-12-12 NOTE — ED PROVIDER NOTE - PHYSICAL EXAMINATION
gen: Appears in pain  Mentation: AAO x 3  psych: mood appropriate  HEENT: airway patent, conjunctivae clear bilaterally  Cardio: RRR, no m/r/g  Resp: normal BS b/l  GI: Global abdominal tenderness, soft/nondistended  Neuro: sensation and motor function grossly intact, 5/5 muscle strength bilaterally in UEX, 5/5 in right GRETA, 4/5 left GRETA, positive straight leg raise in left GRETA. good rectal tone.  Skin: No evidence of rash  Lymph/Vasc: no LE edema

## 2021-12-12 NOTE — H&P ADULT - ATTENDING COMMENTS
66 year old male, with past history significant for Type-2 diabetes mellitus, presented to the ED secondary to back pain.  Being evaluated/managed for Acute osteomyelitis, Psoas abscess (B/L), UTI.  Initially w/ CT scan performed, and now MRI with findings of " Discitis/osteomyelitis of C4/C5 with prevertebral edema/phelgmon extending from C2/C3 through C5/C6 resulting in flattening of the cord without cord edema.  Additional area of discitis/osteomyelitis at L3/L4.  Bilateral psoas abscesses..."  Blood culture with finding of Staph aureus and Staph epidermidis, while UC indicates E-coli.  Started on zosyn and vancomycin 66 year old male, with past history significant for Type-2 diabetes mellitus and essential hypertension, presented to the ED secondary to back pain.  Being evaluated/managed for Acute osteomyelitis, Psoas abscess (B/L), UTI.  Was in ED 2 days prior for back pain as well as constipation x one week, dysuria and generalized weakness/fatigue; given cefpodoxime, enema and had Clemens catheter placed (removed prior to D/c from the ED)   Initially, w/ CT scan performed, and now MRI with findings of " Discitis/osteomyelitis of C4/C5 with prevertebral edema/phelgmon extending from C2/C3 through C5/C6 resulting in flattening of the cord without cord edema.  Additional area of discitis/osteomyelitis at L3/L4.  Bilateral psoas abscesses..."  Blood culture with finding of Staph aureus and Staph epidermidis, while UC indicates E-coli.  Started on zosyn and vancomycin.  Acute OM likely due to Staph aureus.  Neurosurgical consult (called).  On antibiotics.  B/L Psoas muscle abscess (Surgical consult).  UTI being treated.  Ensure optimal pain control and hydration (Dilaudid, PETRA).  ID consult in the AM (please call).  F/u cultures for sensitivity.    Not on medications for HTN and DM; Presently elevated BP may be impacted by stress so would defer starting antihypertensive meds presently.  Has glucosuria.  Likelihood that patient will need antidiabetic med upon Hospital D/c.    All other management as prescribed.

## 2021-12-12 NOTE — H&P ADULT - NSHPLABSRESULTS_GEN_ALL_CORE
(12-12 @ 13:27)                      10.6  8.07 )-----------( 277                 31.5    Neutrophils = 6.01 (74.5%)  Lymphocytes = 1.31 (16.2%)  Eosinophils = 0.10 (1.2%)  Basophils = 0.01 (0.1%)  Monocytes = 0.61 (7.6%)  Bands = --%    12-12    138  |  103  |  10  ----------------------------<  181<H>  3.6   |  26  |  0.70    Ca    8.1<L>      12 Dec 2021 13:27    TPro  6.2  /  Alb  2.7<L>  /  TBili  0.4  /  DBili  x   /  AST  19  /  ALT  46<H>  /  AlkPhos  107  12-12          RVP:    Venous Blood Gas:  12-12 @ 17:17  7.33/51/23/27/28.4  VBG Lactate: 1.4  Venous Blood Gas:  12-12 @ 13:27  7.33/53/28/28/40.7  VBG Lactate: 2.5        Tox: (12-12 @ 13:27)                      10.6  8.07 )-----------( 277                 31.5    Neutrophils = 6.01 (74.5%)  Lymphocytes = 1.31 (16.2%)  Eosinophils = 0.10 (1.2%)  Basophils = 0.01 (0.1%)  Monocytes = 0.61 (7.6%)  Bands = --%    12-12    138  |  103  |  10  ----------------------------<  181<H>  3.6   |  26  |  0.70    Ca    8.1<L>      12 Dec 2021 13:27    TPro  6.2  /  Alb  2.7<L>  /  TBili  0.4  /  DBili  x   /  AST  19  /  ALT  46<H>  /  AlkPhos  107  12-12      RVP:    Venous Blood Gas:  12-12 @ 17:17  7.33/51/23/27/28.4  VBG Lactate: 1.4  Venous Blood Gas:  12-12 @ 13:27  7.33/53/28/28/40.7  VBG Lactate: 2.5      Tox:

## 2021-12-12 NOTE — ED ADULT NURSE NOTE - OBJECTIVE STATEMENT
pt received spot intake 10c. pt A+Ox3 c/o back pain x 1 month. c/o weakness to b/l extremities. reports subjective chills. unable to ambulate since pain started. labs sent. IVSL in place. medicated as ordered. will monitor.

## 2021-12-12 NOTE — ED ADULT TRIAGE NOTE - CHIEF COMPLAINT QUOTE
Pt presents to ED via wheelchair from home with c/o back pain x 3 weeks with no relief from home medications. Pt was seen this ED with similar complaints. Pt denies fall or trauma. Pt has hx of DM.

## 2021-12-12 NOTE — H&P ADULT - PROBLEM SELECTOR PLAN 7
- currently NPO - pending any procedure/s  - please f/u for starting diet  - on IVF at present  - f/u lab-work - currently NPO - pending any procedure/s  - please f/u for starting diet  - on IVF at present  - ensure optimal bowel movements since patient recently w/ constipation x one week and relieved w/ enema  - f/u lab-work

## 2021-12-12 NOTE — ED PROVIDER NOTE - IV ALTEPLASE DOOR HIDDEN
Pulmonary Progress Note  Filomena Heath 76 y o  male MRN: [id number]  Unit/Bed#: -01 Encounter: [id number]      Assesment and Recommendations:    1  Acute hypoxic respiratory failure secondary to recent COVID pneumonia - slowly improving  · D#4 of BID solumedrol - hopeful to wean over the next several days  · Continue to wean oxygen    2  Chronic diastolic congestive heart failure with small bilateral pleural effusions  · Continue lasix     3  Bilateral pulmonary nodules with history of underlying lymphoma  · Follow up imaging as an outpatient    Chief Complaint:  Feeling good    Subjective: The patient feels that he is doing well  No chest pain - minimal dyspnea    Vitals: Blood pressure 118/57, pulse 82, temperature (!) 97 4 °F (36 3 °C), resp  rate 18, height 5' 11" (1 803 m), weight 70 3 kg (154 lb 15 7 oz), SpO2 90 %  , 5L, Body mass index is 21 62 kg/m²  Intake/Output Summary (Last 24 hours) at 5/10/2021 1057  Last data filed at 5/10/2021 0930  Gross per 24 hour   Intake 1281 ml   Output 1200 ml   Net 81 ml       Physical exam:  General:  Patient is awake, alert, non-toxic and in no acute respiratory distress  Neck: No JVD  CV:  Regular, +S1 and S2, No murmurs, gallops or rubs appreciated  Lungs: Clear to auscultation bilateral without wheeze, rales or rhonci  Abdomen: Soft, +BS, Non-tender, non-distended  Extremities: No clubbing, cyanosis or edema  Neuro: No focal deficits        Ivin Kelly, DO      Portions of the record may have been created with voice recognition software  Occasional wrong word or "sound a like" substitutions may have occurred due to the inherent limitations of voice recognition software  Read the chart carefully and recognize, using context, where substitutions have occurred  show

## 2021-12-12 NOTE — H&P ADULT - NSICDXPASTMEDICALHX_GEN_ALL_CORE_FT
PAST MEDICAL HISTORY:  Diabetes mellitus      PAST MEDICAL HISTORY:  Diabetes mellitus     Essential hypertension

## 2021-12-12 NOTE — H&P ADULT - PROBLEM SELECTOR PLAN 1
CT abd pelvis showed L psoas abscess.   -surgery consult for drainage  -Vanc and Zosyn  -blood culture CT abd pelvis showed L psoas abscess.   -surgery consult for drainage  -Vanc and Zosyn  -blood culture  -MRI CT abd pelvis showed L psoas abscess. MRI showed psoas abscess bilaterally   -surgery consult vs IR consult for drainage  -Vanc and Zosyn  -blood culture CT abdomen showed L3-4 questionable erosion concerning for degenerative vs. discitis osteomyelitis. MRI showed discitis/ osteomyelitis in C4/C5 with prevertebral/ phlegmon extending from C2/ C3 to C5/ C6 levels and discitis/ osteomylitis in L3/L4 level. Most likely osteomyelitis explaining the back pain. Unlikely cauda equina syndrome since no bladder/ bowel incontinence/ retention or saddle anesthesia. Unlikely epidural abscess due to no fever or recent surgery. Osteomyelitis most likely due to bacteremia with staph aureus.   -Vanc and Zosyn  -ID consult   -percocet for pain control Patient presented with severe back pain  CT abdomen showed L3-4 questionable erosion concerning for degenerative vs. discitis osteomyelitis. MRI showed discitis/ osteomyelitis in C4/C5 with prevertebral/ phlegmon extending from C2/ C3 to C5/ C6 levels and discitis/ osteomyelitis in L3/L4 level. Most likely osteomyelitis explaining the back pain. Unlikely cauda equina syndrome since no bladder/ bowel incontinence/ retention or saddle anesthesia. Unlikely epidural abscess due to no fever or recent surgery. Osteomyelitis most likely due to bacteremia with staph aureus.   -Vanc and Zosyn  -F/u blood cultures for sensitivity  -Neurosurgical consult (please call)  -ID consult in the AM (please call)  -percocet for pain control (re-evaluate as needed) Patient presented with severe back pain (10/10 maximum intensity), with onset approximately one month ago  CT abdomen showed L3-4 questionable erosion concerning for degenerative vs. discitis osteomyelitis. MRI showed discitis/ osteomyelitis in C4/C5 with prevertebral/ phlegmon extending from C2/ C3 to C5/ C6 levels and discitis/ osteomyelitis in L3/L4 level. Most likely osteomyelitis explaining the back pain. Unlikely cauda equina syndrome since no bladder/ bowel incontinence/ retention or saddle anesthesia. Unlikely epidural abscess due to no fever or recent surgery.  Osteomyelitis most likely due to bacteremia with Staph aureus.   -Vanc and Zosyn  -Dilaudid 1 mg Q4H PRN moderate or severe pain (s/p morphine 4 mg IV x 3 in ED w/ some improvement in pain  -Ensure optimal hydration  -F/u blood cultures for sensitivity  -Fall precautions  -Neurosurgical consult   -ID consult in the AM (please call)  -percocet for pain control (re-evaluate as needed)

## 2021-12-12 NOTE — H&P ADULT - HISTORY OF PRESENT ILLNESS
67 yo M with pmh of DM presented with back pain.   On Nov 15 (almost 1 month prior to admission), upon waking up in the morning, he felt the back pain and leg weakness that he couldn't walk. He denied any trauma or injury to the area. He felt the severe pain all over his spine from his neck down to his lumbar area and shooting pain toward his legs. He never had such pain episodes like this before. He denied bladder/ bowel incontinence. He endorsed increased urinary frequency. He denied N/V/D, fever, chills, chest pain, abdominal pain. He initially presented at ED and was given cefpodoxime but it did not help with his symptoms.  67 yo M with pmh of DM presented with back pain.   On Nov 15 (almost 1 month prior to admission), upon waking up in the morning, he felt the back pain and leg weakness that he couldn't walk. He denied any trauma or injury to the area. He felt the severe pain all over his spine from his neck down to his lumbar area and shooting pain toward his legs. He never had such pain episodes like this before. He denied bladder/ bowel incontinence. He endorsed increased urinary frequency. He denied N/V/D, fever, chills, chest pain, abdominal pain, recent surgery. He initially presented at ED and was given cefpodoxime but it did not help with his symptoms.  65 yo M with pmh of DM presented with back pain.     On Nov 15 (almost 1 month prior to admission), upon waking up in the morning, he felt the back pain and leg weakness that he couldn't walk. He denied any trauma or injury to the area. He felt the severe pain all over his spine from his neck down to his lumbar area and shooting pain toward his legs. He never had such pain episodes like this before. He denied bladder/ bowel incontinence. He endorsed increased urinary frequency. He denied N/V/D, fever, chills, chest pain, abdominal pain, recent surgery. He initially presented at ED and was given cefpodoxime but it did not help with his symptoms.  65 yo M with pmh of DM and Hypertension, presented with back pain.     On Nov 15 (almost 1 month prior to admission), upon waking up in the morning, he felt the back pain and leg weakness that he couldn't walk. He denied any trauma or injury to the area. He felt the severe pain all over his spine from his neck down to his lumbar area and shooting pain toward his legs. He never had such pain episodes like this before. He denied bladder/ bowel incontinence. He endorsed increased urinary frequency. No N/V/D, fever, chills, chest pain, abdominal pain, recent surgery.  Reports new constipation for one week prior to coming to the ED; subsequently relieved w/ enema administered in ED.  During the recent ED visit, patient was also given cefpodoxime, but it did not help with his pain symptoms.  65 yo M with pmh of DM and Hypertension, presented with back pain.     On Nov 15 (almost 1 month prior to admission), upon waking up in the morning, he felt the back pain and leg weakness that he couldn't walk. He denied any trauma or injury to the area. He felt the severe pain all over his spine from his neck down to his lumbar area and shooting pain toward his legs. He never had such pain episodes like this before. He denied bladder/ bowel incontinence. He endorsed increased urinary frequency. No N/V/D, fever, chills, chest pain, abdominal pain, recent surgery or sick contacts.  Recently returned from [facility].  Reports new constipation for one week prior to coming to the ED; subsequently relieved w/ enema administered in ED.  During the recent ED visit, patient was also given cefpodoxime, but it did not help with his pain symptoms.

## 2021-12-12 NOTE — H&P ADULT - NSHPREVIEWOFSYSTEMS_GEN_ALL_CORE
REVIEW OF SYSTEMS:    CONSTITUTIONAL: No weakness, fevers or chills  EYES/ENT: No visual changes;  No vertigo or throat pain   NECK: No pain or stiffness  RESPIRATORY: No cough, wheezing, hemoptysis; No shortness of breath  CARDIOVASCULAR: No chest pain or palpitations  GASTROINTESTINAL: No abdominal or epigastric pain. No nausea, vomiting, or hematemesis; No diarrhea or constipation. No melena or hematochezia.  GENITOURINARY: No dysuria, frequency or hematuria +increased urinary frequency   NEUROLOGICAL: No numbness or weakness. +back pain and leg pain  SKIN: No itching, rashes REVIEW OF SYSTEMS:    CONSTITUTIONAL: No weakness, fevers or chills  EYES/ENT: No visual changes;  No vertigo or throat pain   NECK: No pain or stiffness  RESPIRATORY: No cough, wheezing, hemoptysis; No shortness of breath  CARDIOVASCULAR: No chest pain or palpitations  GASTROINTESTINAL: No abdominal or epigastric pain. No nausea, vomiting, or hematemesis; No diarrhea or constipation. No melena or hematochezia.  GENITOURINARY: No dysuria or hematuria +increased urinary frequency   NEUROLOGICAL: No numbness or weakness. +back pain (involving the entire spinal area) and leg pain  SKIN: No itching, rashes REVIEW OF SYSTEMS:    CONSTITUTIONAL: No weakness, fevers or chills  EYES/ENT: No visual changes;  No vertigo or throat pain   NECK: No pain or stiffness  RESPIRATORY: No cough, wheezing, hemoptysis; No shortness of breath  CARDIOVASCULAR: No chest pain or palpitations  GASTROINTESTINAL: No abdominal or epigastric pain. No nausea, vomiting, or hematemesis; No diarrhea or constipation at present (recent constipation x 1 wk successfully treated w/ enema). No melena or hematochezia.  GENITOURINARY: No dysuria or hematuria +increased urinary frequency   NEUROLOGICAL: No numbness or weakness. +back pain (involving the entire spinal area) and leg pain  SKIN: No itching, rashes

## 2021-12-13 ENCOUNTER — TRANSCRIPTION ENCOUNTER (OUTPATIENT)
Age: 66
End: 2021-12-13

## 2021-12-13 DIAGNOSIS — R63.8 OTHER SYMPTOMS AND SIGNS CONCERNING FOOD AND FLUID INTAKE: ICD-10-CM

## 2021-12-13 DIAGNOSIS — R73.03 PREDIABETES: ICD-10-CM

## 2021-12-13 DIAGNOSIS — I10 ESSENTIAL (PRIMARY) HYPERTENSION: ICD-10-CM

## 2021-12-13 DIAGNOSIS — Z29.9 ENCOUNTER FOR PROPHYLACTIC MEASURES, UNSPECIFIED: ICD-10-CM

## 2021-12-13 LAB
-  AMIKACIN: SIGNIFICANT CHANGE UP
-  AMOXICILLIN/CLAVULANIC ACID: SIGNIFICANT CHANGE UP
-  AMPICILLIN/SULBACTAM: SIGNIFICANT CHANGE UP
-  AMPICILLIN/SULBACTAM: SIGNIFICANT CHANGE UP
-  AMPICILLIN: SIGNIFICANT CHANGE UP
-  AZTREONAM: SIGNIFICANT CHANGE UP
-  CEFAZOLIN: SIGNIFICANT CHANGE UP
-  CEFAZOLIN: SIGNIFICANT CHANGE UP
-  CEFEPIME: SIGNIFICANT CHANGE UP
-  CEFOXITIN: SIGNIFICANT CHANGE UP
-  CEFTRIAXONE: SIGNIFICANT CHANGE UP
-  CIPROFLOXACIN: SIGNIFICANT CHANGE UP
-  CLINDAMYCIN: SIGNIFICANT CHANGE UP
-  ERTAPENEM: SIGNIFICANT CHANGE UP
-  ERYTHROMYCIN: SIGNIFICANT CHANGE UP
-  GENTAMICIN: SIGNIFICANT CHANGE UP
-  GENTAMICIN: SIGNIFICANT CHANGE UP
-  IMIPENEM: SIGNIFICANT CHANGE UP
-  LEVOFLOXACIN: SIGNIFICANT CHANGE UP
-  MEROPENEM: SIGNIFICANT CHANGE UP
-  NITROFURANTOIN: SIGNIFICANT CHANGE UP
-  OXACILLIN: SIGNIFICANT CHANGE UP
-  PENICILLIN: SIGNIFICANT CHANGE UP
-  PIPERACILLIN/TAZOBACTAM: SIGNIFICANT CHANGE UP
-  RIFAMPIN: SIGNIFICANT CHANGE UP
-  TETRACYCLINE: SIGNIFICANT CHANGE UP
-  TIGECYCLINE: SIGNIFICANT CHANGE UP
-  TOBRAMYCIN: SIGNIFICANT CHANGE UP
-  TRIMETHOPRIM/SULFAMETHOXAZOLE: SIGNIFICANT CHANGE UP
-  TRIMETHOPRIM/SULFAMETHOXAZOLE: SIGNIFICANT CHANGE UP
-  VANCOMYCIN: SIGNIFICANT CHANGE UP
24R-OH-CALCIDIOL SERPL-MCNC: 42.3 NG/ML — SIGNIFICANT CHANGE UP (ref 30–80)
A1C WITH ESTIMATED AVERAGE GLUCOSE RESULT: 6.3 % — HIGH (ref 4–5.6)
ALBUMIN SERPL ELPH-MCNC: 2.6 G/DL — LOW (ref 3.3–5)
ALP SERPL-CCNC: 101 U/L — SIGNIFICANT CHANGE UP (ref 40–120)
ALT FLD-CCNC: 44 U/L — HIGH (ref 4–41)
ANION GAP SERPL CALC-SCNC: 11 MMOL/L — SIGNIFICANT CHANGE UP (ref 7–14)
APTT BLD: 27.8 SEC — SIGNIFICANT CHANGE UP (ref 27–36.3)
AST SERPL-CCNC: 20 U/L — SIGNIFICANT CHANGE UP (ref 4–40)
BASOPHILS # BLD AUTO: 0.01 K/UL — SIGNIFICANT CHANGE UP (ref 0–0.2)
BASOPHILS NFR BLD AUTO: 0.2 % — SIGNIFICANT CHANGE UP (ref 0–2)
BILIRUB SERPL-MCNC: 0.8 MG/DL — SIGNIFICANT CHANGE UP (ref 0.2–1.2)
BUN SERPL-MCNC: 6 MG/DL — LOW (ref 7–23)
CALCIUM SERPL-MCNC: 8.4 MG/DL — SIGNIFICANT CHANGE UP (ref 8.4–10.5)
CHLORIDE SERPL-SCNC: 102 MMOL/L — SIGNIFICANT CHANGE UP (ref 98–107)
CO2 SERPL-SCNC: 22 MMOL/L — SIGNIFICANT CHANGE UP (ref 22–31)
CREAT SERPL-MCNC: 0.76 MG/DL — SIGNIFICANT CHANGE UP (ref 0.5–1.3)
CULTURE RESULTS: SIGNIFICANT CHANGE UP
EOSINOPHIL # BLD AUTO: 0.16 K/UL — SIGNIFICANT CHANGE UP (ref 0–0.5)
EOSINOPHIL NFR BLD AUTO: 2.5 % — SIGNIFICANT CHANGE UP (ref 0–6)
ESTIMATED AVERAGE GLUCOSE: 134 — SIGNIFICANT CHANGE UP
GLUCOSE BLDC GLUCOMTR-MCNC: 102 MG/DL — HIGH (ref 70–99)
GLUCOSE BLDC GLUCOMTR-MCNC: 104 MG/DL — HIGH (ref 70–99)
GLUCOSE BLDC GLUCOMTR-MCNC: 76 MG/DL — SIGNIFICANT CHANGE UP (ref 70–99)
GLUCOSE BLDC GLUCOMTR-MCNC: 95 MG/DL — SIGNIFICANT CHANGE UP (ref 70–99)
GLUCOSE SERPL-MCNC: 132 MG/DL — HIGH (ref 70–99)
HCT VFR BLD CALC: 32.4 % — LOW (ref 39–50)
HCV AB S/CO SERPL IA: 0.2 S/CO — SIGNIFICANT CHANGE UP (ref 0–0.99)
HCV AB SERPL-IMP: SIGNIFICANT CHANGE UP
HGB BLD-MCNC: 10.9 G/DL — LOW (ref 13–17)
IANC: 3.97 K/UL — SIGNIFICANT CHANGE UP (ref 1.5–8.5)
IMM GRANULOCYTES NFR BLD AUTO: 0.3 % — SIGNIFICANT CHANGE UP (ref 0–1.5)
INR BLD: 1.22 RATIO — HIGH (ref 0.88–1.16)
LYMPHOCYTES # BLD AUTO: 1.91 K/UL — SIGNIFICANT CHANGE UP (ref 1–3.3)
LYMPHOCYTES # BLD AUTO: 29.4 % — SIGNIFICANT CHANGE UP (ref 13–44)
MAGNESIUM SERPL-MCNC: 1.7 MG/DL — SIGNIFICANT CHANGE UP (ref 1.6–2.6)
MCHC RBC-ENTMCNC: 30.1 PG — SIGNIFICANT CHANGE UP (ref 27–34)
MCHC RBC-ENTMCNC: 33.6 GM/DL — SIGNIFICANT CHANGE UP (ref 32–36)
MCV RBC AUTO: 89.5 FL — SIGNIFICANT CHANGE UP (ref 80–100)
METHOD TYPE: SIGNIFICANT CHANGE UP
METHOD TYPE: SIGNIFICANT CHANGE UP
MONOCYTES # BLD AUTO: 0.42 K/UL — SIGNIFICANT CHANGE UP (ref 0–0.9)
MONOCYTES NFR BLD AUTO: 6.5 % — SIGNIFICANT CHANGE UP (ref 2–14)
NEUTROPHILS # BLD AUTO: 3.97 K/UL — SIGNIFICANT CHANGE UP (ref 1.8–7.4)
NEUTROPHILS NFR BLD AUTO: 61.1 % — SIGNIFICANT CHANGE UP (ref 43–77)
NRBC # BLD: 0 /100 WBCS — SIGNIFICANT CHANGE UP
NRBC # FLD: 0 K/UL — SIGNIFICANT CHANGE UP
ORGANISM # SPEC MICROSCOPIC CNT: SIGNIFICANT CHANGE UP
PHOSPHATE SERPL-MCNC: 2.5 MG/DL — SIGNIFICANT CHANGE UP (ref 2.5–4.5)
PLATELET # BLD AUTO: 315 K/UL — SIGNIFICANT CHANGE UP (ref 150–400)
POTASSIUM SERPL-MCNC: 3.7 MMOL/L — SIGNIFICANT CHANGE UP (ref 3.5–5.3)
POTASSIUM SERPL-SCNC: 3.7 MMOL/L — SIGNIFICANT CHANGE UP (ref 3.5–5.3)
PROT SERPL-MCNC: 6.7 G/DL — SIGNIFICANT CHANGE UP (ref 6–8.3)
PROTHROM AB SERPL-ACNC: 13.8 SEC — HIGH (ref 10.6–13.6)
RBC # BLD: 3.62 M/UL — LOW (ref 4.2–5.8)
RBC # FLD: 14.9 % — HIGH (ref 10.3–14.5)
SARS-COV-2 RNA SPEC QL NAA+PROBE: SIGNIFICANT CHANGE UP
SODIUM SERPL-SCNC: 135 MMOL/L — SIGNIFICANT CHANGE UP (ref 135–145)
SPECIMEN SOURCE: SIGNIFICANT CHANGE UP
TSH SERPL-MCNC: 1.9 UIU/ML — SIGNIFICANT CHANGE UP (ref 0.27–4.2)
VANCOMYCIN TROUGH SERPL-MCNC: 5 UG/ML — LOW (ref 10–20)
WBC # BLD: 6.49 K/UL — SIGNIFICANT CHANGE UP (ref 3.8–10.5)
WBC # FLD AUTO: 6.49 K/UL — SIGNIFICANT CHANGE UP (ref 3.8–10.5)

## 2021-12-13 PROCEDURE — 93010 ELECTROCARDIOGRAM REPORT: CPT

## 2021-12-13 PROCEDURE — 99223 1ST HOSP IP/OBS HIGH 75: CPT | Mod: GC

## 2021-12-13 PROCEDURE — 99233 SBSQ HOSP IP/OBS HIGH 50: CPT | Mod: GC

## 2021-12-13 RX ORDER — HEPARIN SODIUM 5000 [USP'U]/ML
5000 INJECTION INTRAVENOUS; SUBCUTANEOUS EVERY 12 HOURS
Refills: 0 | Status: DISCONTINUED | OUTPATIENT
Start: 2021-12-13 | End: 2021-12-13

## 2021-12-13 RX ORDER — NAFCILLIN 10 G/100ML
INJECTION, POWDER, FOR SOLUTION INTRAVENOUS
Refills: 0 | Status: DISCONTINUED | OUTPATIENT
Start: 2021-12-13 | End: 2021-12-14

## 2021-12-13 RX ORDER — POTASSIUM CHLORIDE 20 MEQ
20 PACKET (EA) ORAL ONCE
Refills: 0 | Status: COMPLETED | OUTPATIENT
Start: 2021-12-13 | End: 2021-12-13

## 2021-12-13 RX ORDER — NAFCILLIN 10 G/100ML
2 INJECTION, POWDER, FOR SOLUTION INTRAVENOUS EVERY 4 HOURS
Refills: 0 | Status: DISCONTINUED | OUTPATIENT
Start: 2021-12-13 | End: 2021-12-14

## 2021-12-13 RX ORDER — INSULIN HUMAN 100 [IU]/ML
INJECTION, SOLUTION SUBCUTANEOUS EVERY 6 HOURS
Refills: 0 | Status: DISCONTINUED | OUTPATIENT
Start: 2021-12-13 | End: 2021-12-13

## 2021-12-13 RX ORDER — INFLUENZA VIRUS VACCINE 15; 15; 15; 15 UG/.5ML; UG/.5ML; UG/.5ML; UG/.5ML
0.7 SUSPENSION INTRAMUSCULAR ONCE
Refills: 0 | Status: DISCONTINUED | OUTPATIENT
Start: 2021-12-13 | End: 2021-12-14

## 2021-12-13 RX ORDER — HYDROMORPHONE HYDROCHLORIDE 2 MG/ML
1 INJECTION INTRAMUSCULAR; INTRAVENOUS; SUBCUTANEOUS EVERY 4 HOURS
Refills: 0 | Status: DISCONTINUED | OUTPATIENT
Start: 2021-12-13 | End: 2021-12-14

## 2021-12-13 RX ORDER — INSULIN LISPRO 100/ML
VIAL (ML) SUBCUTANEOUS EVERY 6 HOURS
Refills: 0 | Status: DISCONTINUED | OUTPATIENT
Start: 2021-12-13 | End: 2021-12-14

## 2021-12-13 RX ORDER — SODIUM CHLORIDE 9 MG/ML
1000 INJECTION, SOLUTION INTRAVENOUS
Refills: 0 | Status: DISCONTINUED | OUTPATIENT
Start: 2021-12-13 | End: 2021-12-14

## 2021-12-13 RX ORDER — NAFCILLIN 10 G/100ML
2 INJECTION, POWDER, FOR SOLUTION INTRAVENOUS ONCE
Refills: 0 | Status: COMPLETED | OUTPATIENT
Start: 2021-12-13 | End: 2021-12-13

## 2021-12-13 RX ORDER — MAGNESIUM SULFATE 500 MG/ML
2 VIAL (ML) INJECTION ONCE
Refills: 0 | Status: COMPLETED | OUTPATIENT
Start: 2021-12-13 | End: 2021-12-13

## 2021-12-13 RX ADMIN — HEPARIN SODIUM 5000 UNIT(S): 5000 INJECTION INTRAVENOUS; SUBCUTANEOUS at 05:59

## 2021-12-13 RX ADMIN — NAFCILLIN 200 GRAM(S): 10 INJECTION, POWDER, FOR SOLUTION INTRAVENOUS at 14:53

## 2021-12-13 RX ADMIN — Medication 250 MILLIGRAM(S): at 08:21

## 2021-12-13 RX ADMIN — PIPERACILLIN AND TAZOBACTAM 25 GRAM(S): 4; .5 INJECTION, POWDER, LYOPHILIZED, FOR SOLUTION INTRAVENOUS at 05:59

## 2021-12-13 RX ADMIN — SODIUM CHLORIDE 100 MILLILITER(S): 9 INJECTION, SOLUTION INTRAVENOUS at 08:21

## 2021-12-13 RX ADMIN — Medication 25 GRAM(S): at 14:36

## 2021-12-13 RX ADMIN — NAFCILLIN 200 GRAM(S): 10 INJECTION, POWDER, FOR SOLUTION INTRAVENOUS at 18:00

## 2021-12-13 RX ADMIN — HYDROMORPHONE HYDROCHLORIDE 1 MILLIGRAM(S): 2 INJECTION INTRAMUSCULAR; INTRAVENOUS; SUBCUTANEOUS at 22:16

## 2021-12-13 RX ADMIN — HYDROMORPHONE HYDROCHLORIDE 1 MILLIGRAM(S): 2 INJECTION INTRAMUSCULAR; INTRAVENOUS; SUBCUTANEOUS at 22:01

## 2021-12-13 RX ADMIN — Medication 20 MILLIEQUIVALENT(S): at 14:20

## 2021-12-13 NOTE — CONSULT NOTE ADULT - ASSESSMENT
A/P: 66y Male with osteomyelitis/discitis of cervical and lumbar spine with psoas abscess    Pain control  IV antibiotics  ID consult  PT/OT  WBAT  Plan for any possible surgical intervention to be discussed later today with attending Dr. Lara

## 2021-12-13 NOTE — CONSULT NOTE ADULT - REASON FOR ADMISSION
Acute osteomyelitis, Back pain, Bacteremia

## 2021-12-13 NOTE — PROGRESS NOTE ADULT - ATTENDING COMMENTS
65 yo M with pmh of DM and Hypertension, presented with back pain.     # Sepsis- Pt met sepsis criteria on admission with HR of 107 and WBC > 12  Likely secondary to Spine OM/Discisits due to Staph aureus   MRI with findings of " Discitis/osteomyelitis of C4/C5 with prevertebral edema/phelgmon extending from C2/C3 through C5/C6 resulting in flattening of the cord without cord edema.  Additional area of discitis/osteomyelitis at L3/L4.  Bilateral psoas abscesses.  Blood culture  12/10 with finding of Staph aureus and Staph epidermidis, while UC indicates E-coli.  On zosyn and vancomycin.   IR eval-psoas abscess too small for drainage at this time. No indiaction for aspiration/sampling as already have positive blood cultures to guide therapy. If patient does not imrove on IV antibiotics, can consider interval reimaging in 1 week to assess for development of abscess, reconsult prn  Ortho eval – plan [facility]   ID eval appreciated – Advise to DC vanco, zosyn and transition to Nafcillin 2 gm iv q4. Check TTE, repeat bcx    # Pre-Operative Evaluation /Risk Assessment – Reports he walks 2 miles routinely. Denies CP/SOB/LE  edema. Denies history of CVA/CAD or HF.. No documented past medical history of ACS, heart failure, pulmonary embolism, or malignant arrhythmia... On exam, vital signs WNL, no carotid murmur nor systolic murmur. Lungs clear without crackles. No JVD, or LE edema. EKG demonstrates sinus rhythm w/ LVH.  TTE to evaluate for endocarditis pending. RCRI score is 1, which estimates a 6% chance of mortality within 30 days of surgery. Pt optimized for medium risk procedure     # Type II DM- AIC- 6.3. Monitor BS    # DVT Prox- HSQ

## 2021-12-13 NOTE — CONSULT NOTE ADULT - SUBJECTIVE AND OBJECTIVE BOX
HPI:  65 yo M with pmh of DM and Hypertension, presented with back pain.     On Nov 15 (almost 1 month prior to admission), upon waking up in the morning, he felt the back pain and leg weakness that he couldn't walk. He denied any trauma or injury to the area. He felt the severe pain all over his spine from his neck down to his lumbar area and shooting pain toward his legs. He never had such pain episodes like this before. He denied bladder/ bowel incontinence. He endorsed increased urinary frequency. No N/V/D, fever, chills, chest pain, abdominal pain, recent surgery or sick contacts.  Recently returned from [facility].  Reports new constipation for one week prior to coming to the ED; subsequently relieved w/ enema administered in ED.  During the recent ED visit, patient was also given cefpodoxime, but it did not help with his pain symptoms.   (12 Dec 2021 19:22)      PAST MEDICAL & SURGICAL HISTORY:  Diabetes mellitus    Essential hypertension    History of hemorrhoidectomy        Allergies  No Known Allergies        ANTIMICROBIALS:  piperacillin/tazobactam IVPB.. 3.375 every 8 hours  vancomycin  IVPB 1000 every 12 hours      OTHER MEDS: MEDICATIONS  (STANDING):  acetaminophen     Tablet .. 650 every 6 hours PRN  aluminum hydroxide/magnesium hydroxide/simethicone Suspension 30 every 4 hours PRN  bisacodyl 5 at bedtime PRN  dextrose 40% Gel 15 once  dextrose 50% Injectable 25 once  dextrose 50% Injectable 12.5 once  dextrose 50% Injectable 25 once  glucagon  Injectable 1 once  heparin   Injectable 5000 every 12 hours  HYDROmorphone  Injectable 1 every 4 hours PRN  influenza  Vaccine (HIGH DOSE) 0.7 once  insulin lispro (ADMELOG) corrective regimen sliding scale  every 6 hours  melatonin 3 at bedtime PRN  ondansetron Injectable 4 every 8 hours PRN      SOCIAL HISTORY:  [ ] etoh [ ] tobacco [ ] former smoker [ ] IVDU    FAMILY HISTORY:  No pertinent family history in first degree relatives        REVIEW OF SYSTEMS  [  ] ROS unobtainable because:    [  ] All other systems negative except as noted below:	    Constitutional:  [ ] fever [ ] chills  [ ] weight loss  [ ] weakness  Skin:  [ ] rash [ ] phlebitis	  Eyes: [ ] icterus [ ] pain  [ ] discharge	  ENMT: [ ] sore throat  [ ] thrush [ ] ulcers [ ] exudates  Respiratory: [ ] dyspnea [ ] hemoptysis [ ] cough [ ] sputum	  Cardiovascular:  [ ] chest pain [ ] palpitations [ ] edema	  Gastrointestinal:  [ ] nausea [ ] vomiting [ ] diarrhea [ ] constipation [ ] pain	  Genitourinary:  [ ] dysuria [ ] frequency [ ] hematuria [ ] discharge [ ] flank pain  [ ] incontinence  Musculoskeletal:  [ ] myalgias [ ] arthralgias [ ] arthritis  [ ] back pain  Neurological:  [ ] headache [ ] seizures  [ ] confusion/altered mental status  Psychiatric:  [ ] anxiety [ ] depression	  Hematology/Lymphatics:  [ ] lymphadenopathy  Endocrine:  [ ] adrenal [ ] thyroid  Allergic/Immunologic:	 [ ] transplant [ ] seasonal    Vital Signs Last 24 Hrs  T(F): 99.6 (12-13-21 @ 05:47), Max: 100.4 (12-10-21 @ 11:21)    Vital Signs Last 24 Hrs  HR: 62 (12-13-21 @ 05:47) (62 - 107)  BP: 122/78 (12-13-21 @ 05:47) (110/79 - 158/95)  RR: 16 (12-13-21 @ 05:47)  SpO2: 100% (12-13-21 @ 05:47) (96% - 100%)  Wt(kg): --    PHYSICAL EXAM:  Constitutional: non-toxic, no distress  HEAD/EYES: anicteric, no conjunctival injection  ENT:  supple, no thrush  Cardiovascular:   normal S1, S2, no murmur, no edema  Respiratory:  clear BS bilaterally, no wheezes, no rales  GI:  soft, non-tender, normal bowel sounds  :  no bowman, no CVA tenderness  Musculoskeletal:  no synovitis, normal ROM  Neurologic: awake and alert, normal strength, no focal findings  Skin:  no rash, no erythema, no phlebitis  Heme/Onc: no lymphadenopathy   Psychiatric:  awake, alert, appropriate mood                                10.6   8.07  )-----------( 277      ( 12 Dec 2021 13:27 )             31.5       12-12    138  |  103  |  10  ----------------------------<  181<H>  3.6   |  26  |  0.70    Ca    8.1<L>      12 Dec 2021 13:27    TPro  6.2  /  Alb  2.7<L>  /  TBili  0.4  /  DBili  x   /  AST  19  /  ALT  46<H>  /  AlkPhos  107  12-12          MICROBIOLOGY:  .Blood Blood-Peripheral  12-10-21   Growth in aerobic bottle: Staphylococcus aureus  See previous culture [id number]  Growth in anaerobic bottle: Staphylococcus epidermidis  Coag Negative Staphylococcus  Single set isolate, possible contaminant. Contact  Microbiology if susceptibility testing clinically  indicated.  --  Escherichia coli ESBL      .Blood Blood-Venous  12-10-21   Growth in aerobic and anaerobic bottles: Staphylococcus aureus  ***Blood Panel PCR results on this specimen are available  approximately 3 hours after the Gram stain result.***  Gram stain, PCR, and/or culture results may not always  correspond dueto difference in methodologies.  ************************************************************  This PCR assay was performed by multiplex PCR. This  Assay tests for 66 bacterial and resistance gene targets.  Please refer to the [facility] test directory  at [email or web address] for details.  --  Blood Culture PCR  Staphylococcus aureus              Vancomycin Level, Trough: 5.0 ug/mL (12-13-21 @ 06:35)  v    RADIOLOGY:  ACC: [id number] EXAM:  CT ABDOMEN AND PELVIS IC                     PROCEDURE DATE:  12/12/2021    INTERPRETATION:  CLINICAL INFORMATION: Abdominal tenderness globally  FINDINGS:  LOWER CHEST: Bibasilar linear atelectasis. Trace pleural effusions.  PERITONEUM: Small free fluid in the right paracolic gutter.  ABDOMINAL WALL: Loculated fluid collection in the left psoas muscle   measuring 2.8 cm x 1.3 cm consistent with an abscess.  BONES: Degenerative changes. Endplate changes at L3-L4 with questionable erosion may be from degenerative changes versus discitis osteomyelitis given psoas abscess. Correlate with already ordered MR lumbar spine.    IMPRESSION:  Left psoas muscle abscess.    Endplate changes at L3-L4 with questionable erosion may be from   degenerative changes versus discitis osteomyelitis. Correlate with   already ordered MR lumbar spine.    ACC: [id number] EXAM:  MR SPINE CERVICAL IC                          PROCEDURE DATE:  12/12/2021    ******PRELIMINARY REPORT******    INTERPRETATION:  Discitis/osteomyelitis of C4/C5 with prevertebral edema/phelgmon extending from C2/C3 through C5/C6 resulting in flattening of the cord without cord edema.  Additional area of discitis/osteomyelitis at L3/L4.  Bilateral psoas abscesses as seen on prior CT abdomen/pelvis.   HPI:  65 yo M with pmh of DM and Hypertension, presented with back pain.     On Nov 15 (almost 1 month prior to admission), upon waking up in the morning, he felt the back pain and leg weakness that he couldn't walk. He denied any trauma or injury to the area. He felt the severe pain all over his spine from his neck down to his lumbar area and shooting pain toward his legs. He never had such pain episodes like this before. He denied bladder/ bowel incontinence. He endorsed increased urinary frequency. No N/V/D, fever, chills, chest pain, abdominal pain, recent surgery or sick contacts.  Recently returned from [facility].  Reports new constipation for one week prior to coming to the ED; subsequently relieved w/ enema administered in ED.  During the recent ED visit, patient was also given cefpodoxime, but it did not help with his pain symptoms.   (12 Dec 2021 19:22)      PAST MEDICAL & SURGICAL HISTORY:  Diabetes mellitus    Essential hypertension    History of hemorrhoidectomy        Allergies  No Known Allergies        ANTIMICROBIALS:  piperacillin/tazobactam IVPB.. 3.375 every 8 hours  vancomycin  IVPB 1000 every 12 hours      OTHER MEDS: MEDICATIONS  (STANDING):  acetaminophen     Tablet .. 650 every 6 hours PRN  aluminum hydroxide/magnesium hydroxide/simethicone Suspension 30 every 4 hours PRN  bisacodyl 5 at bedtime PRN  dextrose 40% Gel 15 once  dextrose 50% Injectable 25 once  dextrose 50% Injectable 12.5 once  dextrose 50% Injectable 25 once  glucagon  Injectable 1 once  heparin   Injectable 5000 every 12 hours  HYDROmorphone  Injectable 1 every 4 hours PRN  influenza  Vaccine (HIGH DOSE) 0.7 once  insulin lispro (ADMELOG) corrective regimen sliding scale  every 6 hours  melatonin 3 at bedtime PRN  ondansetron Injectable 4 every 8 hours PRN      SOCIAL HISTORY:  [ ] etoh [ ] tobacco [ ] former smoker [ ] IVDU    FAMILY HISTORY:  No pertinent family history in first degree relatives        REVIEW OF SYSTEMS  [  ] ROS unobtainable because:    [ x ] All other systems negative except as noted below:	    Constitutional:  [ ] fever [ x ] chills  [ ] weight loss  [ ] weakness  Skin:  [ ] rash [ ] phlebitis	  Eyes: [ ] icterus [ ] pain  [ ] discharge	  ENMT: [ ] sore throat  [ ] thrush [ ] ulcers [ ] exudates  Respiratory: [ ] dyspnea [ ] hemoptysis [ ] cough [ ] sputum	  Cardiovascular:  [ ] chest pain [ ] palpitations [ ] edema	  Gastrointestinal:  [ ] nausea [ ] vomiting [ ] diarrhea [ ] constipation [ ] pain	  Genitourinary:  [ ] dysuria [ ] frequency [ ] hematuria [ ] discharge [ ] flank pain  [ ] incontinence  Musculoskeletal:  [ ] myalgias [ ] arthralgias [ ] arthritis  [ x ] back pain  Neurological:  [ x ] numbness [ ] headache [ ] seizures  [ ] confusion/altered mental status  Psychiatric:  [ ] anxiety [ ] depression	  Hematology/Lymphatics:  [ ] lymphadenopathy  Endocrine:  [ ] adrenal [ ] thyroid  Allergic/Immunologic:	 [ ] transplant [ ] seasonal    Vital Signs Last 24 Hrs  T(F): 99.6 (12-13-21 @ 05:47), Max: 100.4 (12-10-21 @ 11:21)    Vital Signs Last 24 Hrs  HR: 62 (12-13-21 @ 05:47) (62 - 107)  BP: 122/78 (12-13-21 @ 05:47) (110/79 - 158/95)  RR: 16 (12-13-21 @ 05:47)  SpO2: 100% (12-13-21 @ 05:47) (96% - 100%)  Wt(kg): --    PHYSICAL EXAM:  Constitutional: non-toxic, no distress  HEAD/EYES: anicteric, no conjunctival injection  ENT:  supple, no thrush  Cardiovascular:   normal S1, S2, no murmur, no edema  Respiratory:  clear BS bilaterally, no wheezes, no rales  GI:  soft, non-tender, normal bowel sounds  :  no bowman, no CVA tenderness  Musculoskeletal:  +TTP along entire spine, no synovitis, normal ROM  Neurologic: awake and alert, 4/5 strength BLE  Skin:  no rash, no erythema, no phlebitis  Heme/Onc: no lymphadenopathy   Psychiatric:  awake, alert, appropriate mood                                10.6   8.07  )-----------( 277      ( 12 Dec 2021 13:27 )             31.5       12-12    138  |  103  |  10  ----------------------------<  181<H>  3.6   |  26  |  0.70    Ca    8.1<L>      12 Dec 2021 13:27    TPro  6.2  /  Alb  2.7<L>  /  TBili  0.4  /  DBili  x   /  AST  19  /  ALT  46<H>  /  AlkPhos  107  12-12          MICROBIOLOGY:  .Blood Blood-Peripheral  12-10-21   Growth in aerobic bottle: Staphylococcus aureus  See previous culture [id number]  Growth in anaerobic bottle: Staphylococcus epidermidis  Coag Negative Staphylococcus  Single set isolate, possible contaminant. Contact  Microbiology if susceptibility testing clinically  indicated.  --  Escherichia coli ESBL      .Blood Blood-Venous  12-10-21   Growth in aerobic and anaerobic bottles: Staphylococcus aureus  ***Blood Panel PCR results on this specimen are available  approximately 3 hours after the Gram stain result.***  Gram stain, PCR, and/or culture results may not always  correspond dueto difference in methodologies.  ************************************************************  This PCR assay was performed by multiplex PCR. This  Assay tests for 66 bacterial and resistance gene targets.  Please refer to the [facility] test directory  at [email or web address] for details.  --  Blood Culture PCR  Staphylococcus aureus              Vancomycin Level, Trough: 5.0 ug/mL (12-13-21 @ 06:35)  v    RADIOLOGY:  ACC: [id number] EXAM:  CT ABDOMEN AND PELVIS IC                     PROCEDURE DATE:  12/12/2021    INTERPRETATION:  CLINICAL INFORMATION: Abdominal tenderness globally  FINDINGS:  LOWER CHEST: Bibasilar linear atelectasis. Trace pleural effusions.  PERITONEUM: Small free fluid in the right paracolic gutter.  ABDOMINAL WALL: Loculated fluid collection in the left psoas muscle   measuring 2.8 cm x 1.3 cm consistent with an abscess.  BONES: Degenerative changes. Endplate changes at L3-L4 with questionable erosion may be from degenerative changes versus discitis osteomyelitis given psoas abscess. Correlate with already ordered MR lumbar spine.    IMPRESSION:  Left psoas muscle abscess.    Endplate changes at L3-L4 with questionable erosion may be from   degenerative changes versus discitis osteomyelitis. Correlate with   already ordered MR lumbar spine.    ACC: [id number] EXAM:  MR SPINE CERVICAL IC                          PROCEDURE DATE:  12/12/2021    ******PRELIMINARY REPORT******    INTERPRETATION:  Discitis/osteomyelitis of C4/C5 with prevertebral edema/phelgmon extending from C2/C3 through C5/C6 resulting in flattening of the cord without cord edema.  Additional area of discitis/osteomyelitis at L3/L4.  Bilateral psoas abscesses as seen on prior CT abdomen/pelvis.

## 2021-12-13 NOTE — PROGRESS NOTE ADULT - PROBLEM SELECTOR PLAN 2
Presented with severe pain of the back, inability to ambulate due to pain  CT abd pelvis showed L psoas abscess.  MRI showed psoas abscess bilaterally.  Bacteremia with Staph aureus and Staph epidermidis (though staph epidermidis is likely contaminant).  UC w/ E-coli  -surgery consult vs IR consult for drainage  -on Vanc and Zosyn  -blood culture  -analgesic PRN Presented with severe pain of the back, inability to ambulate due to pain  CT abd pelvis showed L psoas abscess.  MRI showed psoas abscess bilaterally.  Bacteremia with Staph aureus and Staph epidermidis (though staph epidermidis is likely contaminant).  UC w/ E-coli  -IR consulted to drain psoas abscess - evaluated and found abscess to be too small to drain.   -on Vanc and Zosyn  -F/u BCx as above Presented with severe pain of the back, inability to ambulate due to pain  CT abd pelvis showed L psoas abscess.  MRI showed psoas abscess bilaterally.  Bacteremia with Staph aureus and Staph epidermidis (though staph epidermidis is likely contaminant).    -IR consulted to drain psoas abscess - evaluated and found abscess to be too small to drain.   -on Vanc and Zosyn  -F/u BCx as above Presented with severe pain of the back, inability to ambulate due to pain  CT abd pelvis showed L psoas abscess.  MRI showed psoas abscess bilaterally.  Bacteremia with Staph aureus and Staph epidermidis (though staph epidermidis is likely contaminant).    -IR consulted for possible psoas abscess drainage - evaluated and found abscess to be too small to drain.   -Abx as above  -F/u BCx as above

## 2021-12-13 NOTE — CHART NOTE - NSCHARTNOTEFT_GEN_A_CORE
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 He is a low risk patient for a high risk procedure. They have no anginal symptoms nor dyspnea on exertion, able to walk multiple fights of stairs without problems, prior to his immobility due to osteomyelitis and abscess. They have no clinical evidence of PE such as leg swelling dyspnea, oxygen requirement, or chest pain. He has no orthopnea or paroxysmal nocturnal dyspnea. They no report of palpitations or syncope. There is no documented past medical history of ACS, heart failure, pulmonary embolism, or malignant arrhythmia. RCRI score is 1, which estimates a 6% chance of mortality within 30 days of surgery. They have no family history of complications with anesthesia. On exam, vital signs WNL, no carotid murmur nor systolic murmur. Lungs clear without crackles. No JVD, or LE edema.     EKG demonstrates sinus rhythm w/ LVH.     Luis E Rodriguez is medically optimized for surgery.    Hesham Jerez, PGY-1   Internal Medicine  Pager: [phone] / [facility]: [phone]

## 2021-12-13 NOTE — CONSULT NOTE ADULT - CONSULT REASON
osteomyelitis, back pain
MSSA bacteremia, cervical/lumbar OM, psoas abscess
evaluate left psoas abscess

## 2021-12-13 NOTE — CONSULT NOTE ADULT - SUBJECTIVE AND OBJECTIVE BOX
Orthopedic Surgery Consult Note     Patient is a 66y Male who presents c/o back pain for 1 month of progressively worsening back pain. Patient states that he woke up approximately 1 months ago with so severe that he was unable to walk. He feels that the inability to walk is due to pain rather than weakness. This pain became progressively worse. He endorses pain throughout his spine that also travels laterally across waist in the lower back. He reports paresthesias in both legs and perianal numbness since the pain began. He also began to experience fever. He arrived to the  from [facility] a few days ago to seek medical care. Denies HS/LOC. Denies pain/injury elsewhere. Denies bowel/bladder incontinence. Denies IV drug use. Of note, the patient was seen in the ER at [facility] on 12/10 and had blood cultures done, which were positive for staph aureus. He returns because his symptoms did not improve. He reports that his symptoms have improved somewhat during this current hospitalization.     HEALTH ISSUES - PROBLEM Dx:  Bacteremia    Acute osteomyelitis    Psoas abscess    DVT prophylaxis    Acute UTI    Type 2 diabetes mellitus    Essential hypertension    Nutrition, metabolism, and development symptoms            MEDICATIONS  (STANDING):  dextrose 40% Gel 15 Gram(s) Oral once  dextrose 5%. 1000 milliLiter(s) IV Continuous <Continuous>  dextrose 5%. 1000 milliLiter(s) IV Continuous <Continuous>  dextrose 50% Injectable 25 Gram(s) IV Push once  dextrose 50% Injectable 12.5 Gram(s) IV Push once  dextrose 50% Injectable 25 Gram(s) IV Push once  glucagon  Injectable 1 milliGRAM(s) IntraMuscular once  heparin   Injectable 5000 Unit(s) SubCutaneous every 12 hours  insulin lispro (ADMELOG) corrective regimen sliding scale   SubCutaneous every 6 hours  lactated ringers. 1000 milliLiter(s) IV Continuous <Continuous>  piperacillin/tazobactam IVPB.. 3.375 Gram(s) IV Intermittent every 8 hours  vancomycin  IVPB 1000 milliGRAM(s) IV Intermittent every 12 hours      Allergies    No Known Allergies    Intolerances        PAST MEDICAL & SURGICAL HISTORY:  No pertinent past medical history    Diabetes mellitus    Essential hypertension    History of hemorrhoidectomy                              10.6   8.07  )-----------( 277      ( 12 Dec 2021 13:27 )             31.5       12 Dec 2021 13:27    138    |  103    |  10     ----------------------------<  181    3.6     |  26     |  0.70     Ca    8.1        12 Dec 2021 13:27    TPro  6.2    /  Alb  2.7    /  TBili  0.4    /  DBili  x      /  AST  19     /  ALT  46     /  AlkPhos  107    12 Dec 2021 13:27              Vital Signs Last 24 Hrs  T(C): 37.1 (12-13-21 @ 03:32), Max: 37.1 (12-12-21 @ 19:25)  T(F): 98.8 (12-13-21 @ 03:32), Max: 98.8 (12-13-21 @ 03:32)  HR: 68 (12-13-21 @ 03:32) (68 - 107)  BP: 132/80 (12-13-21 @ 03:32) (110/79 - 158/95)  BP(mean): --  RR: 16 (12-13-21 @ 03:32) (16 - 17)  SpO2: 100% (12-13-21 @ 03:32) (96% - 100%)    Gen: NAD    Spine PE:  Skin intact  No gross deformity  Midline TTP in proximal cervical spine, lumbar spine  No bony step offs  No paraspinal muscle ttp/hypertonicity   Positive straight leg raise bilaterally  Negative clonus  Negative babinski  Negative tam  + rectal tone  + saddle anesthesia    Motor:                   C5                C6              C7               C8           T1   R            5/5                5/5            5/5             5/5          5/5  L             5/5               5/5             5/5             5/5          5/5                L2             L3             L4               L5            S1  R         */5           5/5          5/5             5/5           5/5  L          */5          5/5           5/5             5/5           5/5    Patient unable to SLR due to pain    Sensory:            C5         C6         C7      C8       T1        (0=absent, 1=impaired, 2=normal, NT=not testable)  R         2            2           2        2         2  L          2            2           2        2         2               L2          L3         L4      L5       S1         (0=absent, 1=impaired, 2=normal, NT=not testable)  R         2            2            1        1        1  L          2            2           1        1         1    L4-S1 paresthesias bilaterally    Imaging:  MRI C/T/L spine with contrast shows C4/5 osteomyelitis/discitis associated with canal narrowing and prevertebral phlegmon extending C2-C5. Also shows L3/4 osteomyelitis/discitis as well as large, broad-based L4/5 disc herniation    CT A/P shows left psoas abscess

## 2021-12-13 NOTE — CONSULT NOTE ADULT - ATTENDING COMMENTS
This is a 66 year old male that presents with one month of worsening inability to ambulate, tingling/weakness in hands, bilateral lower extremity radiculopathy and numbness over his groin. Subsequent imaging has shown that he has severe stenosis in his subaxial spine and in his lumbar spine. He also has severe stenosis in his lumbar spine. His strength is symmetric but he does have substantial impairment in balance.    He is currently dealing with cervical spondylotic myelopathy. I did discuss with the patient in great detail his diagnosis and treatment plan. We would proceed with an initial cervical procedure to address his critical central cervical stenosis. This would be a C3-C6 decompression/fusion. Pending his ability to recover from this procedure we would likely proceed with a lumbar procedure at a later date to address his lumbar spinal stenosis. I did discuss with him risks/benefits of the procedure. He did agree to proceed with the procedure as planned. Proper informed consent was obtained.
66M with LE weakness, MSSA bacteremia, cervical and lumbar osteomyelitis, psoas abscess  -DC vanco, zosyn  -Nafcillin 2 gm iv q4  -check TTE  -check repeat bcx  -f/u Ortho spine plans regarding surgery    -DM control    Piter Vasquez  Attending Physician   Division of Infectious Disease  Pager #[phone]  Available on Microsoft Teams also  After 5pm/weekend or no response, call #[phone]

## 2021-12-13 NOTE — PROGRESS NOTE ADULT - PROBLEM SELECTOR PLAN 8
Heparin SQ Diet: Consistent Carb  DVT: heparin SubQ Q12  Dispo: in patient - transfer to [facility] for surgery

## 2021-12-13 NOTE — CONSULT NOTE ADULT - TIME BILLING
Please note that over 70 minutes of time was spent in care of this patient including   - Discussion with resident/PA team  - Review of imaging  - In patient visit  - Discussion with family  - Post visit documentation

## 2021-12-13 NOTE — PROGRESS NOTE ADULT - SUBJECTIVE AND OBJECTIVE BOX
Hesham Jerez MD  Internal Medicine, PGY-1  Pager: [phone] / [facility]: [phone]    SUBJECTIVE / OVERNIGHT EVENTS:  - Pt seen and examined at bedside  - GENTRY    MEDICATIONS  (STANDING):  dextrose 40% Gel 15 Gram(s) Oral once  dextrose 5%. 1000 milliLiter(s) (50 mL/Hr) IV Continuous <Continuous>  dextrose 5%. 1000 milliLiter(s) (100 mL/Hr) IV Continuous <Continuous>  dextrose 50% Injectable 25 Gram(s) IV Push once  dextrose 50% Injectable 12.5 Gram(s) IV Push once  dextrose 50% Injectable 25 Gram(s) IV Push once  glucagon  Injectable 1 milliGRAM(s) IntraMuscular once  heparin   Injectable 5000 Unit(s) SubCutaneous every 12 hours  influenza  Vaccine (HIGH DOSE) 0.7 milliLiter(s) IntraMuscular once  insulin lispro (ADMELOG) corrective regimen sliding scale   SubCutaneous every 6 hours  lactated ringers. 1000 milliLiter(s) (100 mL/Hr) IV Continuous <Continuous>  piperacillin/tazobactam IVPB.. 3.375 Gram(s) IV Intermittent every 8 hours  vancomycin  IVPB 1000 milliGRAM(s) IV Intermittent every 12 hours    MEDICATIONS  (PRN):  acetaminophen     Tablet .. 650 milliGRAM(s) Oral every 6 hours PRN Temp greater or equal to 38C (100.4F), Mild Pain (1 - 3)  aluminum hydroxide/magnesium hydroxide/simethicone Suspension 30 milliLiter(s) Oral every 4 hours PRN Dyspepsia  bisacodyl 5 milliGRAM(s) Oral at bedtime PRN Constipation  HYDROmorphone  Injectable 1 milliGRAM(s) IV Push every 4 hours PRN Moderate Pain (4 - 6) or Severe Pain (7 - 10)  melatonin 3 milliGRAM(s) Oral at bedtime PRN Insomnia  ondansetron Injectable 4 milliGRAM(s) IV Push every 8 hours PRN Nausea and/or Vomiting      PHYSICAL EXAM:  Vital Signs Last 24 Hrs  T(C): 37.6 (13 Dec 2021 05:47), Max: 37.6 (13 Dec 2021 05:47)  T(F): 99.6 (13 Dec 2021 05:47), Max: 99.6 (13 Dec 2021 05:47)  HR: 62 (13 Dec 2021 05:47) (62 - 107)  BP: 122/78 (13 Dec 2021 05:47) (110/79 - 158/95)  BP(mean): --  RR: 16 (13 Dec 2021 05:47) (16 - 17)  SpO2: 100% (13 Dec 2021 05:47) (96% - 100%)    CAPILLARY BLOOD GLUCOSE      POCT Blood Glucose.: 76 mg/dL (13 Dec 2021 05:58)  POCT Blood Glucose.: 73 mg/dL (12 Dec 2021 23:23)  POCT Blood Glucose.: 172 mg/dL (12 Dec 2021 11:54)    I&O's Summary      CONSTITUTIONAL: NAD, well-developed  RESPIRATORY: Normal respiratory effort; lungs are clear to auscultation bilaterally  CARDIOVASCULAR: Regular rate and rhythm, normal S1 and S2, no murmur/rub/gallop; No lower extremity edema; Peripheral pulses are 2+ bilaterally  ABDOMEN: Nontender to palpation, normoactive bowel sounds, no rebound/guarding; No hepatosplenomegaly  MUSCLOSKELETAL: no clubbing or cyanosis of digits; no joint swelling or tenderness to palpation  PSYCH: A+O to person, place, and time; affect appropriate    LABS:                        10.6   8.07  )-----------( 277      ( 12 Dec 2021 13:27 )             31.5     12-12    138  |  103  |  10  ----------------------------<  181<H>  3.6   |  26  |  0.70    Ca    8.1<L>      12 Dec 2021 13:27    TPro  6.2  /  Alb  2.7<L>  /  TBili  0.4  /  DBili  x   /  AST  19  /  ALT  46<H>  /  AlkPhos  107  12-12    PT/INR - ( 13 Dec 2021 06:35 )   PT: 13.8 sec;   INR: 1.22 ratio         PTT - ( 13 Dec 2021 06:35 )  PTT:27.8 sec          Culture - Urine (collected 10 Dec 2021 14:55)  Source: Clean Catch Clean Catch (Midstream)  Preliminary Report (12 Dec 2021 15:21):    >100,000 CFU/ml Escherichia coli    Culture - Blood (collected 10 Dec 2021 12:09)  Source: .Blood Blood-Peripheral  Gram Stain (11 Dec 2021 19:07):    Growth in aerobic and anaerobic bottles: Gram Positive Cocci in Clusters  Final Report (13 Dec 2021 08:47):    Growth in aerobic bottle: Staphylococcus aureus    See previous culture [id number]    Growth in anaerobic bottle: Staphylococcus epidermidis    Coag Negative Staphylococcus    Single set isolate, possible contaminant. Contact    Microbiology if susceptibility testing clinically    indicated.    Culture - Blood (collected 10 Dec 2021 12:00)  Source: .Blood Blood-Venous  Gram Stain (12 Dec 2021 01:22):    Growth in aerobic bottle: Gram Positive Cocci in Clusters    Growth in anaerobic bottle: Gram Positive Cocci in Clusters  Final Report (13 Dec 2021 08:46):    Growth in aerobic and anaerobic bottles: Staphylococcus aureus    ***Blood Panel PCR results on this specimen are available    approximately 3 hours after the Gram stain result.***    Gram stain, PCR, and/or culture results may not always    correspond dueto difference in methodologies.    ************************************************************    This PCR assay was performed by multiplex PCR. This    Assay tests for 66 bacterial and resistance gene targets.    Please refer to the [facility] Labs test directory    at [email or web address] for details.  Organism: Blood Culture PCR  Staphylococcus aureus (13 Dec 2021 08:46)  Organism: Staphylococcus aureus (13 Dec 2021 08:46)  Organism: Blood Culture PCR (13 Dec 2021 08:46)        IMAGING:   Hesham Jerez MD  Internal Medicine, PGY-1  Pager: [phone] / [facility]: [phone]    SUBJECTIVE / OVERNIGHT EVENTS:  - Pt seen and examined at bedside  - Patient reports symptoms since Nov 15 however patient was in [facility] and reported that he was not offered any treatment. The patient's [place] and [place] on Thursday to seek treatment for his medical condition.   - The patient reports back pain stemming from his C spine radiating to his lower spine and legs. He is unable to move his legs well and he reports overall weakness in his body.       MEDICATIONS  (STANDING):  dextrose 40% Gel 15 Gram(s) Oral once  dextrose 5%. 1000 milliLiter(s) (50 mL/Hr) IV Continuous <Continuous>  dextrose 5%. 1000 milliLiter(s) (100 mL/Hr) IV Continuous <Continuous>  dextrose 50% Injectable 25 Gram(s) IV Push once  dextrose 50% Injectable 12.5 Gram(s) IV Push once  dextrose 50% Injectable 25 Gram(s) IV Push once  glucagon  Injectable 1 milliGRAM(s) IntraMuscular once  heparin   Injectable 5000 Unit(s) SubCutaneous every 12 hours  influenza  Vaccine (HIGH DOSE) 0.7 milliLiter(s) IntraMuscular once  insulin lispro (ADMELOG) corrective regimen sliding scale   SubCutaneous every 6 hours  lactated ringers. 1000 milliLiter(s) (100 mL/Hr) IV Continuous <Continuous>  piperacillin/tazobactam IVPB.. 3.375 Gram(s) IV Intermittent every 8 hours  vancomycin  IVPB 1000 milliGRAM(s) IV Intermittent every 12 hours    MEDICATIONS  (PRN):  acetaminophen     Tablet .. 650 milliGRAM(s) Oral every 6 hours PRN Temp greater or equal to 38C (100.4F), Mild Pain (1 - 3)  aluminum hydroxide/magnesium hydroxide/simethicone Suspension 30 milliLiter(s) Oral every 4 hours PRN Dyspepsia  bisacodyl 5 milliGRAM(s) Oral at bedtime PRN Constipation  HYDROmorphone  Injectable 1 milliGRAM(s) IV Push every 4 hours PRN Moderate Pain (4 - 6) or Severe Pain (7 - 10)  melatonin 3 milliGRAM(s) Oral at bedtime PRN Insomnia  ondansetron Injectable 4 milliGRAM(s) IV Push every 8 hours PRN Nausea and/or Vomiting      PHYSICAL EXAM:  Vital Signs Last 24 Hrs  T(C): 37.6 (13 Dec 2021 05:47), Max: 37.6 (13 Dec 2021 05:47)  T(F): 99.6 (13 Dec 2021 05:47), Max: 99.6 (13 Dec 2021 05:47)  HR: 62 (13 Dec 2021 05:47) (62 - 107)  BP: 122/78 (13 Dec 2021 05:47) (110/79 - 158/95)  BP(mean): --  RR: 16 (13 Dec 2021 05:47) (16 - 17)  SpO2: 100% (13 Dec 2021 05:47) (96% - 100%)    CAPILLARY BLOOD GLUCOSE      POCT Blood Glucose.: 76 mg/dL (13 Dec 2021 05:58)  POCT Blood Glucose.: 73 mg/dL (12 Dec 2021 23:23)  POCT Blood Glucose.: 172 mg/dL (12 Dec 2021 11:54)    I&O's Summary      CONSTITUTIONAL: NAD, well-developed  RESPIRATORY: Normal respiratory effort; lungs are clear to auscultation bilaterally  CARDIOVASCULAR: Regular rate and rhythm, normal S1 and S2, no murmur/rub/gallop; No lower extremity edema; Peripheral pulses are 2+ bilaterally  ABDOMEN: Nontender to palpation, normoactive bowel sounds, no rebound/guarding; No hepatosplenomegaly  MUSCLOSKELETAL: no clubbing or cyanosis of digits; no joint swelling or tenderness to palpation  PSYCH: A+O to person, place, and time; affect appropriate    LABS:                        10.6   8.07  )-----------( 277      ( 12 Dec 2021 13:27 )             31.5     12-12    138  |  103  |  10  ----------------------------<  181<H>  3.6   |  26  |  0.70    Ca    8.1<L>      12 Dec 2021 13:27    TPro  6.2  /  Alb  2.7<L>  /  TBili  0.4  /  DBili  x   /  AST  19  /  ALT  46<H>  /  AlkPhos  107  12-12    PT/INR - ( 13 Dec 2021 06:35 )   PT: 13.8 sec;   INR: 1.22 ratio         PTT - ( 13 Dec 2021 06:35 )  PTT:27.8 sec          Culture - Urine (collected 10 Dec 2021 14:55)  Source: Clean Catch Clean Catch (Midstream)  Preliminary Report (12 Dec 2021 15:21):    >100,000 CFU/ml Escherichia coli    Culture - Blood (collected 10 Dec 2021 12:09)  Source: .Blood Blood-Peripheral  Gram Stain (11 Dec 2021 19:07):    Growth in aerobic and anaerobic bottles: Gram Positive Cocci in Clusters  Final Report (13 Dec 2021 08:47):    Growth in aerobic bottle: Staphylococcus aureus    See previous culture [id number]    Growth in anaerobic bottle: Staphylococcus epidermidis    Coag Negative Staphylococcus    Single set isolate, possible contaminant. Contact    Microbiology if susceptibility testing clinically    indicated.    Culture - Blood (collected 10 Dec 2021 12:00)  Source: .Blood Blood-Venous  Gram Stain (12 Dec 2021 01:22):    Growth in aerobic bottle: Gram Positive Cocci in Clusters    Growth in anaerobic bottle: Gram Positive Cocci in Clusters  Final Report (13 Dec 2021 08:46):    Growth in aerobic and anaerobic bottles: Staphylococcus aureus    ***Blood Panel PCR results on this specimen are available    approximately 3 hours after the Gram stain result.***    Gram stain, PCR, and/or culture results may not always    correspond dueto difference in methodologies.    ************************************************************    This PCR assay was performed by multiplex PCR. This    Assay tests for 66 bacterial and resistance gene targets.    Please refer to the [facility] Labs test directory    at [email or web address] for details.  Organism: Blood Culture PCR  Staphylococcus aureus (13 Dec 2021 08:46)  Organism: Staphylococcus aureus (13 Dec 2021 08:46)  Organism: Blood Culture PCR (13 Dec 2021 08:46)        IMAGING:   Hesham Jerez MD  Internal Medicine, PGY-1  Pager: [phone] / [facility]: [phone]    SUBJECTIVE / OVERNIGHT EVENTS:  - Pt seen and examined at bedside  - Patient reports symptoms since Nov 15 however patient was in [facility] and reported that he was not offered any treatment. The patient's [place], and [place] on Thursday to seek treatment for his medical condition.   - The patient reports back pain stemming from his C spine radiating to his lower spine and legs. He is unable to move his legs well and he reports overall weakness in his body.       MEDICATIONS  (STANDING):  dextrose 40% Gel 15 Gram(s) Oral once  dextrose 5%. 1000 milliLiter(s) (50 mL/Hr) IV Continuous <Continuous>  dextrose 5%. 1000 milliLiter(s) (100 mL/Hr) IV Continuous <Continuous>  dextrose 50% Injectable 25 Gram(s) IV Push once  dextrose 50% Injectable 12.5 Gram(s) IV Push once  dextrose 50% Injectable 25 Gram(s) IV Push once  glucagon  Injectable 1 milliGRAM(s) IntraMuscular once  heparin   Injectable 5000 Unit(s) SubCutaneous every 12 hours  influenza  Vaccine (HIGH DOSE) 0.7 milliLiter(s) IntraMuscular once  insulin lispro (ADMELOG) corrective regimen sliding scale   SubCutaneous every 6 hours  lactated ringers. 1000 milliLiter(s) (100 mL/Hr) IV Continuous <Continuous>  piperacillin/tazobactam IVPB.. 3.375 Gram(s) IV Intermittent every 8 hours  vancomycin  IVPB 1000 milliGRAM(s) IV Intermittent every 12 hours    MEDICATIONS  (PRN):  acetaminophen     Tablet .. 650 milliGRAM(s) Oral every 6 hours PRN Temp greater or equal to 38C (100.4F), Mild Pain (1 - 3)  aluminum hydroxide/magnesium hydroxide/simethicone Suspension 30 milliLiter(s) Oral every 4 hours PRN Dyspepsia  bisacodyl 5 milliGRAM(s) Oral at bedtime PRN Constipation  HYDROmorphone  Injectable 1 milliGRAM(s) IV Push every 4 hours PRN Moderate Pain (4 - 6) or Severe Pain (7 - 10)  melatonin 3 milliGRAM(s) Oral at bedtime PRN Insomnia  ondansetron Injectable 4 milliGRAM(s) IV Push every 8 hours PRN Nausea and/or Vomiting      PHYSICAL EXAM:  Vital Signs Last 24 Hrs  T(C): 37.6 (13 Dec 2021 05:47), Max: 37.6 (13 Dec 2021 05:47)  T(F): 99.6 (13 Dec 2021 05:47), Max: 99.6 (13 Dec 2021 05:47)  HR: 62 (13 Dec 2021 05:47) (62 - 107)  BP: 122/78 (13 Dec 2021 05:47) (110/79 - 158/95)  BP(mean): --  RR: 16 (13 Dec 2021 05:47) (16 - 17)  SpO2: 100% (13 Dec 2021 05:47) (96% - 100%)    CAPILLARY BLOOD GLUCOSE      POCT Blood Glucose.: 76 mg/dL (13 Dec 2021 05:58)  POCT Blood Glucose.: 73 mg/dL (12 Dec 2021 23:23)  POCT Blood Glucose.: 172 mg/dL (12 Dec 2021 11:54)    I&O's Summary      CONSTITUTIONAL: NAD, well-developed  RESPIRATORY: Normal respiratory effort; lungs are clear to auscultation bilaterally  CARDIOVASCULAR: Regular rate and rhythm, normal S1 and S2, no murmur/rub/gallop; No lower extremity edema; Peripheral pulses are 2+ bilaterally  ABDOMEN: Nontender to palpation, normoactive bowel sounds, no rebound/guarding; No hepatosplenomegaly  BACK: pain on palpation throughout spine. No noted open wounds present.   MUSCLOSKELETAL: no clubbing or cyanosis of digits; no joint swelling or tenderness to palpation, 3/5 b/l LE strength b/l, patient able to ambulate. 4/5 strength UE  PSYCH: A+O to person, place, and time; affect appropriate    LABS:                        10.6   8.07  )-----------( 277      ( 12 Dec 2021 13:27 )             31.5     12-12    138  |  103  |  10  ----------------------------<  181<H>  3.6   |  26  |  0.70    Ca    8.1<L>      12 Dec 2021 13:27    TPro  6.2  /  Alb  2.7<L>  /  TBili  0.4  /  DBili  x   /  AST  19  /  ALT  46<H>  /  AlkPhos  107  12-12    PT/INR - ( 13 Dec 2021 06:35 )   PT: 13.8 sec;   INR: 1.22 ratio         PTT - ( 13 Dec 2021 06:35 )  PTT:27.8 sec          Culture - Urine (collected 10 Dec 2021 14:55)  Source: Clean Catch Clean Catch (Midstream)  Preliminary Report (12 Dec 2021 15:21):    >100,000 CFU/ml Escherichia coli    Culture - Blood (collected 10 Dec 2021 12:09)  Source: .Blood Blood-Peripheral  Gram Stain (11 Dec 2021 19:07):    Growth in aerobic and anaerobic bottles: Gram Positive Cocci in Clusters  Final Report (13 Dec 2021 08:47):    Growth in aerobic bottle: Staphylococcus aureus    See previous culture [id number]    Growth in anaerobic bottle: Staphylococcus epidermidis    Coag Negative Staphylococcus    Single set isolate, possible contaminant. Contact    Microbiology if susceptibility testing clinically    indicated.    Culture - Blood (collected 10 Dec 2021 12:00)  Source: .Blood Blood-Venous  Gram Stain (12 Dec 2021 01:22):    Growth in aerobic bottle: Gram Positive Cocci in Clusters    Growth in anaerobic bottle: Gram Positive Cocci in Clusters  Final Report (13 Dec 2021 08:46):    Growth in aerobic and anaerobic bottles: Staphylococcus aureus    ***Blood Panel PCR results on this specimen are available    approximately 3 hours after the Gram stain result.***    Gram stain, PCR, and/or culture results may not always    correspond dueto difference in methodologies.    ************************************************************    This PCR assay was performed by multiplex PCR. This    Assay tests for 66 bacterial and resistance gene targets.    Please refer to the [facility] Labs test directory    at [email or web address] for details.  Organism: Blood Culture PCR  Staphylococcus aureus (13 Dec 2021 08:46)  Organism: Staphylococcus aureus (13 Dec 2021 08:46)  Organism: Blood Culture PCR (13 Dec 2021 08:46)        IMAGING:   Hesham Jerez MD  Internal Medicine, PGY-1  Pager: [phone] / [facility]: [phone]    SUBJECTIVE / OVERNIGHT EVENTS:  - Pt seen and examined at bedside  - Patient reports symptoms since Nov 15 however patient was in [facility] and reported that he was not offered any treatment. The patient's [place], and [place] on Thursday to seek treatment for his medical condition.   - The patient reports back pain stemming from his C spine radiating to his lower spine and legs. He is unable to move his legs well and he reports overall weakness in his body.       MEDICATIONS  (STANDING):  dextrose 40% Gel 15 Gram(s) Oral once  dextrose 5%. 1000 milliLiter(s) (50 mL/Hr) IV Continuous <Continuous>  dextrose 5%. 1000 milliLiter(s) (100 mL/Hr) IV Continuous <Continuous>  dextrose 50% Injectable 25 Gram(s) IV Push once  dextrose 50% Injectable 12.5 Gram(s) IV Push once  dextrose 50% Injectable 25 Gram(s) IV Push once  glucagon  Injectable 1 milliGRAM(s) IntraMuscular once  heparin   Injectable 5000 Unit(s) SubCutaneous every 12 hours  influenza  Vaccine (HIGH DOSE) 0.7 milliLiter(s) IntraMuscular once  insulin lispro (ADMELOG) corrective regimen sliding scale   SubCutaneous every 6 hours  lactated ringers. 1000 milliLiter(s) (100 mL/Hr) IV Continuous <Continuous>  piperacillin/tazobactam IVPB.. 3.375 Gram(s) IV Intermittent every 8 hours  vancomycin  IVPB 1000 milliGRAM(s) IV Intermittent every 12 hours    MEDICATIONS  (PRN):  acetaminophen     Tablet .. 650 milliGRAM(s) Oral every 6 hours PRN Temp greater or equal to 38C (100.4F), Mild Pain (1 - 3)  aluminum hydroxide/magnesium hydroxide/simethicone Suspension 30 milliLiter(s) Oral every 4 hours PRN Dyspepsia  bisacodyl 5 milliGRAM(s) Oral at bedtime PRN Constipation  HYDROmorphone  Injectable 1 milliGRAM(s) IV Push every 4 hours PRN Moderate Pain (4 - 6) or Severe Pain (7 - 10)  melatonin 3 milliGRAM(s) Oral at bedtime PRN Insomnia  ondansetron Injectable 4 milliGRAM(s) IV Push every 8 hours PRN Nausea and/or Vomiting      PHYSICAL EXAM:  Vital Signs Last 24 Hrs  T(C): 37.6 (13 Dec 2021 05:47), Max: 37.6 (13 Dec 2021 05:47)  T(F): 99.6 (13 Dec 2021 05:47), Max: 99.6 (13 Dec 2021 05:47)  HR: 62 (13 Dec 2021 05:47) (62 - 107)  BP: 122/78 (13 Dec 2021 05:47) (110/79 - 158/95)  BP(mean): --  RR: 16 (13 Dec 2021 05:47) (16 - 17)  SpO2: 100% (13 Dec 2021 05:47) (96% - 100%)    CAPILLARY BLOOD GLUCOSE      POCT Blood Glucose.: 76 mg/dL (13 Dec 2021 05:58)  POCT Blood Glucose.: 73 mg/dL (12 Dec 2021 23:23)  POCT Blood Glucose.: 172 mg/dL (12 Dec 2021 11:54)    I&O's Summary      CONSTITUTIONAL: NAD, well-developed  RESPIRATORY: Normal respiratory effort; lungs are clear to auscultation bilaterally  CARDIOVASCULAR: Regular rate and rhythm, normal S1 and S2, no murmur/rub/gallop; No lower extremity edema; Peripheral pulses are 2+ bilaterally  ABDOMEN: Nontender to palpation, normoactive bowel sounds, no rebound/guarding; No hepatosplenomegaly  BACK: pain on palpation throughout spine. No noted open wounds present.   MUSCLOSKELETAL: no clubbing or cyanosis of digits; no joint swelling or tenderness to palpation, 3/5 b/l LE strength b/l, patient able to ambulate. 4/5 strength UE  PSYCH: A+O to person, place, and time; affect appropriate    LABS:                        10.6   8.07  )-----------( 277      ( 12 Dec 2021 13:27 )             31.5     12-12    138  |  103  |  10  ----------------------------<  181<H>  3.6   |  26  |  0.70    Ca    8.1<L>      12 Dec 2021 13:27    TPro  6.2  /  Alb  2.7<L>  /  TBili  0.4  /  DBili  x   /  AST  19  /  ALT  46<H>  /  AlkPhos  107  12-12    PT/INR - ( 13 Dec 2021 06:35 )   PT: 13.8 sec;   INR: 1.22 ratio         PTT - ( 13 Dec 2021 06:35 )  PTT:27.8 sec          Culture - Urine (collected 10 Dec 2021 14:55)  Source: Clean Catch Clean Catch (Midstream)  Preliminary Report (12 Dec 2021 15:21):    >100,000 CFU/ml Escherichia coli    Culture - Blood (collected 10 Dec 2021 12:09)  Source: .Blood Blood-Peripheral  Gram Stain (11 Dec 2021 19:07):    Growth in aerobic and anaerobic bottles: Gram Positive Cocci in Clusters  Final Report (13 Dec 2021 08:47):    Growth in aerobic bottle: Staphylococcus aureus    See previous culture [id number]    Growth in anaerobic bottle: Staphylococcus epidermidis    Coag Negative Staphylococcus    Single set isolate, possible contaminant. Contact    Microbiology if susceptibility testing clinically    indicated.    Culture - Blood (collected 10 Dec 2021 12:00)  Source: .Blood Blood-Venous  Gram Stain (12 Dec 2021 01:22):    Growth in aerobic bottle: Gram Positive Cocci in Clusters    Growth in anaerobic bottle: Gram Positive Cocci in Clusters  Final Report (13 Dec 2021 08:46):    Growth in aerobic and anaerobic bottles: Staphylococcus aureus    ***Blood Panel PCR results on this specimen are available    approximately 3 hours after the Gram stain result.***    Gram stain, PCR, and/or culture results may not always    correspond dueto difference in methodologies.    ************************************************************    This PCR assay was performed by multiplex PCR. This    Assay tests for 66 bacterial and resistance gene targets.    Please refer to the [facility] Labs test directory    at [email or web address] for details.  Organism: Blood Culture PCR  Staphylococcus aureus (13 Dec 2021 08:46)  Organism: Staphylococcus aureus (13 Dec 2021 08:46)  Organism: Blood Culture PCR (13 Dec 2021 08:46)        IMAGING:    MRI C,T,L Spine:    IMPRESSION:    CERVICAL SPINE:  1.  Discitis/osteomyelitis at C4-C5, with adjacent thin (4 mm) right   epidural fluid collection resulting in mild to moderate spinal canal   stenosis, as detailed above.  2.  Large prevertebral phlegmon and/or early developing abscess extending   from C2 to C6.  3.  Cervical spondylosis, as detailed above.    THORACIC SPINE:  1.  No suggestion of discitis/osteomyelitis, epidural fluid collection,   within spinal canal stenosis or disc herniation within the thoracic spine.  2.  Small bilateral pleural effusions, right greater than left.    LUMBAR SPINE:  1.  Discitis/osteomyelitis at L3-L4, with adjacent trace epidural   phlegmon without discrete appreciable epidural fluid collection.  2.  Bilateral psoas muscle abscesses and extensive surrounding   paravertebral phlegmon adjacent to the setting of osteomyelitis at L3-L4.  3.  Lumbar spondylosis, most significant for severe spinal canal stenosis   at L4-L5 and multilevel moderate to severe neuroforaminal narrowing, as   detailed above.

## 2021-12-13 NOTE — PROGRESS NOTE ADULT - PROBLEM SELECTOR PLAN 7
- currently NPO - pending any procedure/s  - please f/u for starting diet  - on IVF at present  - ensure optimal bowel movements since patient recently w/ constipation x one week and relieved w/ enema  - f/u lab-work Diet: Consistent Carb  DVT: heparin SubQ Q12  Dispo: in patient - transfer to [facility] for surgery Diet: Consistent Carb  DVT: Lovenox (held for surgery)   Dispo: in patient - transfer to [facility] for surgery, afterwards patient will be transferred back to [facility] for further care since [facility] has no beds available.

## 2021-12-13 NOTE — PROGRESS NOTE ADULT - PROBLEM SELECTOR PLAN 3
Blood Cx grew Staph aureus and Staph epidymidis. UCx grew E. coli.   -Vanc and Zosyn  -blood culture  -TTE  -ID consult Blood Cx (12/10) positive for Staph aureus x2 and Staph Epi x 1 (likely contaminant). UCx grew E. coli.   -Vanc and Zosyn  - Repeat Cx ordered   -TTE ordered MSSA Bacteremia present. Blood Cx (12/10) positive for Staph aureus x2 and Staph Epi x 1 (likely contaminant).   - Abx as above  - Repeat Cx ordered   - TTE pending

## 2021-12-13 NOTE — CHART NOTE - NSCHARTNOTEFT_GEN_A_CORE
Initiated transfer process to [facility] for orthopedic surgery. However, due to limited bed availability, decision was made to transport patient to [facility] on 12/14 for OR then patient will be brought back to [facility]. Initiated transfer process to [facility] for orthopedic surgery. However, due to limited bed availability, decision was made to transport patient to [facility] on 12/14 for OR then patient will be brought back to [facility].    Patient is on the OR schedule for 1:30PM and should arrive in [facility] at 11:30AM on 12/14.

## 2021-12-13 NOTE — CONSULT NOTE ADULT - ASSESSMENT
66M with T2DM and HTN with ED visit 2 days prior to presentation, found to have ESBL UTI and MSSA bacteremia, presented for worsening back pain a/w weakness, found to have b/l psoas abscesses and cervical and lumbar osteomyelitis.    #MSSA bacteremia  #ESBL UTI  #B/l psoas abscesses  #C4-5 and L3-L4 osteomyelitis      - incomplete note -    Gracia Sparrow  Internal Medicine, PGY-1 66M with T2DM and HTN with ED visit 2 days prior to presentation, found to have ESBL UTI and MSSA bacteremia and started on cefpodoxime, presented for worsening back pain a/w weakness, found to have b/l psoas abscesses and cervical and lumbar osteomyelitis.    #MSSA bacteremia  #ESBL UTI  #B/l psoas abscesses  #C4-5 and L3-L4 osteomyelitis      - incomplete note -    Gracia Sparrow  Internal Medicine, PGY-1 66M with T2DM and HTN with ED visit 2 days prior to presentation, found to have ESBL UTI and MSSA bacteremia and started on cefpodoxime, presented for worsening back pain a/w weakness, found to have b/l psoas abscesses and cervical and lumbar osteomyelitis.    #MSSA bacteremia   c/b b/l psoas abscesses and C4-5 and L3-L4 osteomyelitis  12/10 BCx: MSSA, staph epi likely contaminant  IR deferring psoas abscess drainage/sampling d/t small size  - De-escalate to nafcillin 2g q4h  - Obtain TTE  - Repeat blood cultures  - f/u Ortho recs for source control    #Asymptomatic bacteriuria  - No need for treatment    Gracia Sparrow  Internal Medicine, PGY-1

## 2021-12-13 NOTE — PROGRESS NOTE ADULT - PROBLEM SELECTOR PLAN 5
Glucosuria (500), uncontrolled diabetes.  Aware of diagnosis, but not on medications  -A1C  -ISS  -Consistent carb diet when PO nutrition starts Glucosuria (500), however BGs have been controlled while in patient. Patient reports he has been told he has diabetes but is not on any medications for it.   -F/u A1c   -SSI for now Glucosuria (500), however BGs have been controlled while in patient. Patient reports he has been told he has diabetes but is not on any medications for it. A1c in patient 6.3.   -No indication for SSI.  - Patient is NOT a diabetic, has never been formally diagnosed with diabetes just self-reported, however will need close outpatient follow up to prevent progression to DM2.

## 2021-12-13 NOTE — PROGRESS NOTE ADULT - PROBLEM SELECTOR PLAN 4
UA turbid, with large LE, many bacteria, pH = 7.  UCx grew E-coli.  -Enlarged prostate on CT  -Vanc and Zosyn  -f/u UCx sensitivity UA turbid, with large LE, many bacteria, pH = 7.  UCx grew ESBL E coli -  sensitive to Zosyn  -Enlarged prostate on CT  - Continue Vanc and Zosyn UA turbid, with large LE, many bacteria, pH = 7.  UCx grew ESBL E coli -  sensitive to Zosyn  -Enlarged prostate on CT  - Continue abx as above UA turbid, with large LE, many bacteria, pH = 7.  UCx grew ESBL E coli  -Enlarged prostate on CT  - Given patient is asymptomatic, likely contaminant as per ID - d/c zosyn. Will continue to monitor.

## 2021-12-13 NOTE — PROGRESS NOTE ADULT - PROBLEM SELECTOR PLAN 6
Aware of diagnosis PTA, but not on any medications for same  - current elevation may be impacted by stress from pain  - if persistently elevated, would initiate low dose tx Patient reports he was diagnosed w/ essentia HTN, however takes no meds for this. BP while in patient are WNL  - Will trend BPs and control BP accordingly. Patient reports he was diagnosed w/ essential HTN, however takes no meds for this. BP while in patient are WNL  - Will trend BPs and control BP accordingly.

## 2021-12-13 NOTE — PROGRESS NOTE ADULT - PROBLEM SELECTOR PLAN 1
Patient presented with severe back pain (10/10 maximum intensity), with onset approximately one month ago  CT abdomen showed L3-4 questionable erosion concerning for degenerative vs. discitis osteomyelitis. MRI showed discitis/ osteomyelitis in C4/C5 with prevertebral/ phlegmon extending from C2/ C3 to C5/ C6 levels and discitis/ osteomyelitis in L3/L4 level. Most likely osteomyelitis explaining the back pain. Unlikely cauda equina syndrome since no bladder/ bowel incontinence/ retention or saddle anesthesia. Unlikely epidural abscess due to no fever or recent surgery.  Osteomyelitis most likely due to bacteremia with Staph aureus.   -Vanc and Zosyn  -Dilaudid 1 mg Q4H PRN moderate or severe pain (s/p morphine 4 mg IV x 3 in ED w/ some improvement in pain  -Ensure optimal hydration  -F/u blood cultures for sensitivity  -Fall precautions  -Neurosurgical consult   -ID consult in the AM (please call)  -percocet for pain control (re-evaluate as needed) Patient presented with severe back pain (10/10 maximum intensity), with onset approximately one month ago  CT abdomen showed L3-4 questionable erosion concerning for degenerative vs. discitis osteomyelitis. MRI showed discitis/ osteomyelitis in C4/C5 with prevertebral/ phlegmon extending from C2/ C3 to C5/ C6 levels and discitis/ osteomyelitis in L3/L4 level. Most likely osteomyelitis explaining the back pain. Unlikely cauda equina syndrome since no bladder/ bowel incontinence/ retention or saddle anesthesia. Unlikely epidural abscess due to no fever or recent surgery.  Osteomyelitis most likely due to bacteremia with MSSA.   -Vanc and Zosyn  -Dilaudid 1 mg Q4H PRN severe pain (s/p morphine 4 mg IV x 3 in ED w/ some improvement in pain  -Bcx x2  -Fall precautions  -Ortho spine consulted   - ID consulted, appreciate recs  -percocet for pain control (re-evaluate as needed) Patient presented with severe back pain (10/10 maximum intensity), with onset approximately one month ago  CT abdomen showed L3-4 questionable erosion concerning for degenerative vs. discitis osteomyelitis. MRI showed discitis/ osteomyelitis in C4/C5 with prevertebral/ phlegmon extending from C2/ C3 to C5/ C6 levels and discitis/ osteomyelitis in L3/L4 level. Most likely osteomyelitis explaining the back pain. Unlikely cauda equina syndrome since no bladder/ bowel incontinence/ retention or saddle anesthesia. Unlikely epidural abscess due to no fever or recent surgery.  Osteomyelitis most likely due to bacteremia with MSSA (Bcx from 12/10).   -Vanc and Zosyn  -Dilaudid 1 mg Q4H PRN severe pain (s/p morphine 4 mg IV x 3 in ED w/ some improvement in pain  -Bcx repeated   -Fall precautions  -Ortho spine consulted   - ID consulted, appreciate recs  -percocet for pain control (re-evaluate as needed) Patient presented with severe back pain (10/10 maximum intensity), with onset approximately one month ago, care delayed since patient was in [facility]. MRI w/ discitis/ osteomyelitis in C4/C5 with prevertebral/ phlegmon w/ concern for developing abscess extending from C2 to C6 levels and discitis/ osteomyelitis in L3/L4 level and an extensive paravertebral phlegmon across L3-4. High concern for MSSA osteomyelitis given MSSA bacteremia (BCx 12/10).   -Continuing Vanc and Zosyn given patients severe bacteremia and severe spine infection. Will consider de-escalating to Ancef given MSSA, however will f/u w/ ID for recs   -Dilaudid 1 mg Q4H PRN severe pain  -Bcx repeated   -Fall precautions  -Ortho spine consulted, scheduled surgery for 12/14 at [facility]   - ID consulted, appreciate recs Patient presented with severe back pain (10/10 maximum intensity), with onset approximately one month ago, care delayed since patient was in [facility]. MRI w/ discitis/ osteomyelitis in C4/C5 with prevertebral/ phlegmon w/ concern for developing abscess extending from C2 to C6 levels and discitis/ osteomyelitis in L3/L4 level and an extensive paravertebral phlegmon across L3-4. High concern for MSSA osteomyelitis given MSSA bacteremia (BCx 12/10).   -Changed vanc/zosyn to Nafcillin IV Q4 for better CNS penetration per ID   -Dilaudid 1 mg Q4H PRN severe pain  -Bcx repeated   -Fall precautions  -Ortho spine consulted, scheduled surgery for 12/14 at [facility]   - ID consulted, appreciate recs.  - ID consulted, appreciate recs Patient presented with severe back pain (10/10 maximum intensity), with onset approximately one month ago, care delayed since patient was in [facility]. MRI w/ discitis/ osteomyelitis in C4/C5 with prevertebral/ phlegmon w/ concern for developing abscess extending from C2 to C6 levels and discitis/ osteomyelitis in L3/L4 level and an extensive paravertebral phlegmon across L3-4. High concern for MSSA osteomyelitis given MSSA bacteremia (BCx 12/10).   -Changed vanc/zosyn to Nafcillin IV Q4 for better CNS penetration per ID   -Dilaudid 1 mg Q4H PRN severe pain  -Bcx repeated   -Fall precautions  -Ortho spine consulted, scheduled surgery for 12/14 at [facility]   - ID consulted, appreciate recs.

## 2021-12-13 NOTE — CONSULT NOTE ADULT - SUBJECTIVE AND OBJECTIVE BOX
Interventional Radiology    Evaluate for Procedure: evaluate left psoas abscess for drainage    HPI: 66y Male with back pain, osteomyelitis, left psoas abscess for which IR consulted for drainage.    Allergies:   Medications (Abx/Cardiac/Anticoagulation/Blood Products)    heparin   Injectable: 5000 Unit(s) SubCutaneous (12-13 @ 05:59)  piperacillin/tazobactam IVPB..: 25 mL/Hr IV Intermittent (12-13 @ 05:59)  piperacillin/tazobactam IVPB...: 200 mL/Hr IV Intermittent (12-12 @ 15:46)  vancomycin  IVPB: 250 mL/Hr IV Intermittent (12-13 @ 08:21)  vancomycin  IVPB.: 250 mL/Hr IV Intermittent (12-12 @ 13:28)    Data:  180.3  56.8  T(C): 37.6  HR: 62  BP: 122/78  RR: 16  SpO2: 100%    -WBC 8.07 / HgB 10.6 / Hct 31.5 / Plt 277  -Na 138 / Cl 103 / BUN 10 / Glucose 181  -K 3.6 / CO2 26 / Cr 0.70  -ALT 46 / Alk Phos 107 / T.Bili 0.4  -INR 1.22 / PTT 27.8    Culture - Blood (12.10.21 @ 12:09)    Gram Stain:   Growth in aerobic and anaerobic bottles: Gram Positive Cocci in Clusters    Specimen Source: .Blood Blood-Peripheral    Culture Results:   Growth in aerobic bottle: Staphylococcus aureus  See previous culture [id number]  Growth in anaerobic bottle: Staphylococcus epidermidis  Coag Negative Staphylococcus  Single set isolate, possible contaminant. Contact  Microbiology if susceptibility testing clinically  indicated.              Assessment/Plan:    66y Male with back pain, osteomyelitis, left psoas abscess for which IR consulted for drainage.    Imaging reviewed: psoas abscess too small for drainage at this time.   No indiaction for aspiration/sampling as already have positive blood cultures to guide therapy.    If patient does not imrove on IV antibiotics, can consider interval reimaging in 1 week to assess for development of abscess, reconsult bennett Aguayo MD  Interventional Radiology PGY4    -EMERGENT: [facility] IR Pager: [phone].  [facility] IR Pager: [phone] [id number]  -Nonemergent consults:  [facility] order "Consult- Interventional Radiology"   -Available on [employer] TEAMS for questions

## 2021-12-13 NOTE — PATIENT PROFILE ADULT - FALL HARM RISK - HARM RISK INTERVENTIONS
Assistance with ambulation/Assistance OOB with selected safe patient handling equipment/Communicate Risk of Fall with Harm to all staff/Monitor for mental status changes/Monitor gait and stability/Reinforce activity limits and safety measures with patient and family/Reorient to person, place and time as needed/Review medications for side effects contributing to fall risk/Tailored Fall Risk Interventions/Visual Cue: Yellow wristband and red socks/Bed in lowest position, wheels locked, appropriate side rails in place/Call bell, personal items and telephone in reach/Instruct patient to call for assistance before getting out of bed or chair/Non-slip footwear when patient is out of bed/[place] to call system/Physically safe environment - no spills, clutter or unnecessary equipment/Purposeful Proactive Rounding/Room/bathroom lighting operational, light cord in reach

## 2021-12-14 ENCOUNTER — INPATIENT (INPATIENT)
Facility: HOSPITAL | Age: 66
LOS: 0 days | Discharge: ANOTHER HOSPITAL | DRG: 853 | End: 2021-12-15
Attending: GENERAL PRACTICE | Admitting: GENERAL PRACTICE
Payer: MEDICAID

## 2021-12-14 VITALS
SYSTOLIC BLOOD PRESSURE: 153 MMHG | TEMPERATURE: 98 F | OXYGEN SATURATION: 100 % | RESPIRATION RATE: 18 BRPM | DIASTOLIC BLOOD PRESSURE: 81 MMHG | HEART RATE: 76 BPM

## 2021-12-14 VITALS
TEMPERATURE: 99 F | SYSTOLIC BLOOD PRESSURE: 162 MMHG | DIASTOLIC BLOOD PRESSURE: 89 MMHG | RESPIRATION RATE: 18 BRPM | WEIGHT: 125 LBS | HEIGHT: 71 IN | HEART RATE: 67 BPM | OXYGEN SATURATION: 99 %

## 2021-12-14 DIAGNOSIS — Z98.890 OTHER SPECIFIED POSTPROCEDURAL STATES: Chronic | ICD-10-CM

## 2021-12-14 DIAGNOSIS — G95.20 UNSPECIFIED CORD COMPRESSION: ICD-10-CM

## 2021-12-14 PROBLEM — I10 ESSENTIAL (PRIMARY) HYPERTENSION: Chronic | Status: ACTIVE | Noted: 2021-12-13

## 2021-12-14 LAB
ALBUMIN SERPL ELPH-MCNC: 2.4 G/DL — LOW (ref 3.3–5)
ALP SERPL-CCNC: 101 U/L — SIGNIFICANT CHANGE UP (ref 40–120)
ALT FLD-CCNC: 51 U/L — HIGH (ref 4–41)
ANION GAP SERPL CALC-SCNC: 12 MMOL/L — SIGNIFICANT CHANGE UP (ref 7–14)
APTT BLD: 30.1 SEC — SIGNIFICANT CHANGE UP (ref 27–36.3)
AST SERPL-CCNC: 24 U/L — SIGNIFICANT CHANGE UP (ref 4–40)
BILIRUB SERPL-MCNC: 0.8 MG/DL — SIGNIFICANT CHANGE UP (ref 0.2–1.2)
BLD GP AB SCN SERPL QL: NEGATIVE — SIGNIFICANT CHANGE UP
BUN SERPL-MCNC: 8 MG/DL — SIGNIFICANT CHANGE UP (ref 7–23)
CALCIUM SERPL-MCNC: 8.5 MG/DL — SIGNIFICANT CHANGE UP (ref 8.4–10.5)
CHLORIDE SERPL-SCNC: 104 MMOL/L — SIGNIFICANT CHANGE UP (ref 98–107)
CO2 SERPL-SCNC: 19 MMOL/L — LOW (ref 22–31)
CREAT SERPL-MCNC: 0.74 MG/DL — SIGNIFICANT CHANGE UP (ref 0.5–1.3)
GAS PNL BLDA: SIGNIFICANT CHANGE UP
GLUCOSE BLDC GLUCOMTR-MCNC: 125 MG/DL — HIGH (ref 70–99)
GLUCOSE BLDC GLUCOMTR-MCNC: 74 MG/DL — SIGNIFICANT CHANGE UP (ref 70–99)
GLUCOSE BLDC GLUCOMTR-MCNC: 78 MG/DL — SIGNIFICANT CHANGE UP (ref 70–99)
GLUCOSE BLDC GLUCOMTR-MCNC: 81 MG/DL — SIGNIFICANT CHANGE UP (ref 70–99)
GLUCOSE SERPL-MCNC: 101 MG/DL — HIGH (ref 70–99)
HCT VFR BLD CALC: 37.4 % — LOW (ref 39–50)
HGB BLD-MCNC: 11.8 G/DL — LOW (ref 13–17)
INR BLD: 1.16 RATIO — SIGNIFICANT CHANGE UP (ref 0.88–1.16)
MAGNESIUM SERPL-MCNC: 2.1 MG/DL — SIGNIFICANT CHANGE UP (ref 1.6–2.6)
MCHC RBC-ENTMCNC: 29.5 PG — SIGNIFICANT CHANGE UP (ref 27–34)
MCHC RBC-ENTMCNC: 31.6 GM/DL — LOW (ref 32–36)
MCV RBC AUTO: 93.5 FL — SIGNIFICANT CHANGE UP (ref 80–100)
NRBC # BLD: 0 /100 WBCS — SIGNIFICANT CHANGE UP
NRBC # FLD: 0 K/UL — SIGNIFICANT CHANGE UP
PHOSPHATE SERPL-MCNC: 2.9 MG/DL — SIGNIFICANT CHANGE UP (ref 2.5–4.5)
PLATELET # BLD AUTO: 236 K/UL — SIGNIFICANT CHANGE UP (ref 150–400)
POTASSIUM SERPL-MCNC: 3.8 MMOL/L — SIGNIFICANT CHANGE UP (ref 3.5–5.3)
POTASSIUM SERPL-SCNC: 3.8 MMOL/L — SIGNIFICANT CHANGE UP (ref 3.5–5.3)
PROT SERPL-MCNC: 6.2 G/DL — SIGNIFICANT CHANGE UP (ref 6–8.3)
PROTHROM AB SERPL-ACNC: 13.3 SEC — SIGNIFICANT CHANGE UP (ref 10.6–13.6)
RBC # BLD: 4 M/UL — LOW (ref 4.2–5.8)
RBC # FLD: 15.1 % — HIGH (ref 10.3–14.5)
RH IG SCN BLD-IMP: POSITIVE — SIGNIFICANT CHANGE UP
RH IG SCN BLD-IMP: POSITIVE — SIGNIFICANT CHANGE UP
SODIUM SERPL-SCNC: 135 MMOL/L — SIGNIFICANT CHANGE UP (ref 135–145)
WBC # BLD: 4.52 K/UL — SIGNIFICANT CHANGE UP (ref 3.8–10.5)
WBC # FLD AUTO: 4.52 K/UL — SIGNIFICANT CHANGE UP (ref 3.8–10.5)

## 2021-12-14 PROCEDURE — 93306 TTE W/DOPPLER COMPLETE: CPT | Mod: 26

## 2021-12-14 PROCEDURE — 22600 ARTHRD PST TQ 1NTRSPC CRV: CPT

## 2021-12-14 PROCEDURE — 63045 LAM FACETEC & FORAMOT CRV: CPT

## 2021-12-14 PROCEDURE — 72125 CT NECK SPINE W/O DYE: CPT | Mod: 26

## 2021-12-14 PROCEDURE — 22842 INSERT SPINE FIXATION DEVICE: CPT

## 2021-12-14 PROCEDURE — 99232 SBSQ HOSP IP/OBS MODERATE 35: CPT | Mod: 57

## 2021-12-14 PROCEDURE — 63048 LAM FACETEC &FORAMOT EA ADDL: CPT

## 2021-12-14 PROCEDURE — 22614 ARTHRD PST TQ 1NTRSPC EA ADD: CPT

## 2021-12-14 PROCEDURE — 99239 HOSP IP/OBS DSCHRG MGMT >30: CPT | Mod: GC

## 2021-12-14 RX ORDER — SENNA PLUS 8.6 MG/1
2 TABLET ORAL AT BEDTIME
Refills: 0 | Status: DISCONTINUED | OUTPATIENT
Start: 2021-12-15 | End: 2021-12-15

## 2021-12-14 RX ORDER — DEXTROSE 50 % IN WATER 50 %
15 SYRINGE (ML) INTRAVENOUS ONCE
Refills: 0 | Status: DISCONTINUED | OUTPATIENT
Start: 2021-12-14 | End: 2021-12-14

## 2021-12-14 RX ORDER — DEXTROSE 50 % IN WATER 50 %
25 SYRINGE (ML) INTRAVENOUS ONCE
Refills: 0 | Status: DISCONTINUED | OUTPATIENT
Start: 2021-12-14 | End: 2021-12-14

## 2021-12-14 RX ORDER — GLUCAGON INJECTION, SOLUTION 0.5 MG/.1ML
1 INJECTION, SOLUTION SUBCUTANEOUS ONCE
Refills: 0 | Status: DISCONTINUED | OUTPATIENT
Start: 2021-12-14 | End: 2021-12-14

## 2021-12-14 RX ORDER — ACETAMINOPHEN 500 MG
500 TABLET ORAL EVERY 6 HOURS
Refills: 0 | Status: DISCONTINUED | OUTPATIENT
Start: 2021-12-15 | End: 2021-12-15

## 2021-12-14 RX ORDER — SODIUM CHLORIDE 9 MG/ML
1000 INJECTION, SOLUTION INTRAVENOUS
Refills: 0 | Status: DISCONTINUED | OUTPATIENT
Start: 2021-12-14 | End: 2021-12-14

## 2021-12-14 RX ORDER — SODIUM CHLORIDE 9 MG/ML
1000 INJECTION INTRAMUSCULAR; INTRAVENOUS; SUBCUTANEOUS
Refills: 0 | Status: DISCONTINUED | OUTPATIENT
Start: 2021-12-15 | End: 2021-12-15

## 2021-12-14 RX ORDER — MAGNESIUM HYDROXIDE 400 MG/1
30 TABLET, CHEWABLE ORAL EVERY 12 HOURS
Refills: 0 | Status: DISCONTINUED | OUTPATIENT
Start: 2021-12-15 | End: 2021-12-15

## 2021-12-14 RX ORDER — INSULIN LISPRO 100/ML
VIAL (ML) SUBCUTANEOUS
Refills: 0 | Status: DISCONTINUED | OUTPATIENT
Start: 2021-12-14 | End: 2021-12-14

## 2021-12-14 RX ORDER — DEXTROSE 50 % IN WATER 50 %
12.5 SYRINGE (ML) INTRAVENOUS ONCE
Refills: 0 | Status: DISCONTINUED | OUTPATIENT
Start: 2021-12-14 | End: 2021-12-14

## 2021-12-14 RX ORDER — TRAMADOL HYDROCHLORIDE 50 MG/1
50 TABLET ORAL EVERY 6 HOURS
Refills: 0 | Status: DISCONTINUED | OUTPATIENT
Start: 2021-12-15 | End: 2021-12-15

## 2021-12-14 RX ORDER — CYCLOBENZAPRINE HYDROCHLORIDE 10 MG/1
5 TABLET, FILM COATED ORAL THREE TIMES A DAY
Refills: 0 | Status: DISCONTINUED | OUTPATIENT
Start: 2021-12-15 | End: 2021-12-15

## 2021-12-14 RX ORDER — INSULIN LISPRO 100/ML
VIAL (ML) SUBCUTANEOUS AT BEDTIME
Refills: 0 | Status: DISCONTINUED | OUTPATIENT
Start: 2021-12-14 | End: 2021-12-14

## 2021-12-14 RX ORDER — NAFCILLIN 10 G/100ML
2 INJECTION, POWDER, FOR SOLUTION INTRAVENOUS EVERY 4 HOURS
Refills: 0 | Status: DISCONTINUED | OUTPATIENT
Start: 2021-12-14 | End: 2021-12-14

## 2021-12-14 RX ORDER — PANTOPRAZOLE SODIUM 20 MG/1
40 TABLET, DELAYED RELEASE ORAL
Refills: 0 | Status: DISCONTINUED | OUTPATIENT
Start: 2021-12-15 | End: 2021-12-15

## 2021-12-14 RX ORDER — TRAMADOL HYDROCHLORIDE 50 MG/1
100 TABLET ORAL EVERY 6 HOURS
Refills: 0 | Status: DISCONTINUED | OUTPATIENT
Start: 2021-12-15 | End: 2021-12-15

## 2021-12-14 RX ORDER — NAFCILLIN 10 G/100ML
2 INJECTION, POWDER, FOR SOLUTION INTRAVENOUS EVERY 4 HOURS
Refills: 0 | Status: DISCONTINUED | OUTPATIENT
Start: 2021-12-15 | End: 2021-12-15

## 2021-12-14 RX ADMIN — NAFCILLIN 200 GRAM(S): 10 INJECTION, POWDER, FOR SOLUTION INTRAVENOUS at 01:07

## 2021-12-14 RX ADMIN — NAFCILLIN 200 GRAM(S): 10 INJECTION, POWDER, FOR SOLUTION INTRAVENOUS at 15:51

## 2021-12-14 RX ADMIN — NAFCILLIN 200 GRAM(S): 10 INJECTION, POWDER, FOR SOLUTION INTRAVENOUS at 05:07

## 2021-12-14 RX ADMIN — NAFCILLIN 200 GRAM(S): 10 INJECTION, POWDER, FOR SOLUTION INTRAVENOUS at 09:41

## 2021-12-14 NOTE — PROGRESS NOTE ADULT - PROBLEM SELECTOR PLAN 3
MSSA Bacteremia present. Blood Cx (12/10) positive for Staph aureus x2 and Staph Epi x 1 (likely contaminant).   - Abx as above  - Repeat Cx ordered   - TTE pending    #Asymptomatic Bacteriuria:   - UA + UCx w/ E coli, however given asymptomatic will not treat, as per ID. MSSA Bacteremia present. Blood Cx (12/10) positive for Staph aureus x2 and Staph Epi x 1 (likely contaminant). Likely from osteomyelitis w/ abscess formation.    - Abx as above  - Repeat Cx ordered   - TTE w/o vegetation  - Will obtain GIGI once surgery completed and source control obtained.     #Asymptomatic Bacteriuria:   - UA + UCx w/ E coli, however given asymptomatic will not treat, as per ID.

## 2021-12-14 NOTE — H&P ADULT - ATTENDING COMMENTS
I have had a lengthy discussion with the patient in regards to the treatment plan/diagnosis. In particular I am concerned about his hand paresthesias/weakness in the setting of a cervical epidural collection. We will proceed with a C3-C6 decompression/fusion procedure. I have discussed with the patient at length the nature of the procedure and risks/benefits. In particular I described the risks which include pain, need for reoperation, injury to surrounding nerves/arteries/veins, paralysis, nerve root injury, non resolution of symptoms, death, bleeding, instrumentation failure, nonunion, csf leak, dural tear, infection (higher given known systemic infection), wound dehiscence, dvt/pe. The patient did agree to proceed with the procedure planned. Proper informed consent was obtained.

## 2021-12-14 NOTE — PROGRESS NOTE ADULT - PROBLEM SELECTOR PLAN 1
Patient presented with severe back pain (10/10 maximum intensity), with onset approximately one month ago, care delayed since patient was in [facility]. MRI w/ discitis/ osteomyelitis in C4/C5 with prevertebral/ phlegmon w/ concern for developing abscess extending from C2 to C6 levels and discitis/ osteomyelitis in L3/L4 level and an extensive paravertebral phlegmon across L3-4. High concern for MSSA osteomyelitis given MSSA bacteremia (BCx 12/10).   -Continue Nafcillin 2g Q4hrs  -Dilaudid 1 mg Q4H PRN severe pain  -Pending repeat Bcx (12/13)  -Fall precautions  -Ortho spine consulted, scheduled surgery for 12/14 at [facility]   - ID following, appreciating recs

## 2021-12-14 NOTE — PROGRESS NOTE ADULT - PROBLEM SELECTOR PLAN 4
Patient reports he has been told he has diabetes but is not on any medications for it. A1c in patient 6.3.   - No indication for SSI.  - Patient is NOT a diabetic, has never been formally diagnosed with diabetes just self-reported, however will need close outpatient follow up to prevent progression to DM2.

## 2021-12-14 NOTE — PROGRESS NOTE ADULT - ATTENDING COMMENTS
67 yo M with pmh of DM and Hypertension, presented with back pain.     # Sepsis- Pt met sepsis criteria on admission with HR of 107 and WBC > 12  Likely secondary to Spine OM/Discisits due to Staph aureus   MRI with findings of " Discitis/osteomyelitis of C4/C5 with prevertebral edema/phelgmon extending from C2/C3 through C5/C6 resulting in flattening of the cord without cord edema.  Additional area of discitis/osteomyelitis at L3/L4.  Bilateral psoas abscesses.  Blood culture  12/10 with finding of Staph aureus and Staph epidermidis, while UC indicates E-coli.  On Nafcillin 2 gm Q 4 hours  IR eval-psoas abscess too small for drainage at this time. No indiaction for aspiration/sampling as already have positive blood cultures to guide therapy. If patient does not imrove on IV antibiotics, can consider interval reimaging in 1 week to assess for development of abscess, reconsult prn  ID eval appreciated –FU TTE, repeat bcx  DW Ortho  – plan for transfer to [facility] for OR , plan to transfer back to [facility] post procedure if pt stable     # Pre-Operative Evaluation /Risk Assessment – Reports he walks 2 miles routinely. Denies CP/SOB/LE  edema. Denies history of CVA/CAD or HF.. No documented past medical history of ACS, heart failure, pulmonary embolism, or malignant arrhythmia... On exam, vital signs WNL, no carotid murmur nor systolic murmur. Lungs clear without crackles. No JVD, or LE edema. EKG demonstrates sinus rhythm w/ LVH.  TTE to evaluate for endocarditis pending. RCRI score is 1, which estimates a 6% chance of mortality within 30 days of surgery. Pt optimized for medium risk procedure     # Type II DM- AIC- 6.3. Monitor BS    # DVT Prox- HSQ. 67 yo M with pmh of DM and Hypertension, presented with back pain.     # Sepsis- Pt met sepsis criteria on admission with HR of 107 and WBC > 12  Likely secondary to Spine OM/Discisits due to Staph aureus   MRI with findings of " Discitis/osteomyelitis of C4/C5 with prevertebral edema/phelgmon extending from C2/C3 through C5/C6 resulting in flattening of the cord without cord edema.  Additional area of discitis/osteomyelitis at L3/L4.  Bilateral psoas abscesses.  Blood culture  12/10 with finding of Staph aureus and Staph epidermidis, while UC indicates E-coli.  On Nafcillin 2 gm Q 4 hours  IR eval-psoas abscess too small for drainage at this time. No indiaction for aspiration/sampling as already have positive blood cultures to guide therapy. If patient does not imrove on IV antibiotics, can consider interval reimaging in 1 week to assess for development of abscess, reconsult prn  ID eval appreciated –FU TTE, repeat bcx  DW Ortho  – plan for transfer to [facility] for OR , plan to transfer back to [facility] post procedure if pt stable     # Pre-Operative Evaluation /Risk Assessment – Reports he walks 2 miles routinely. Denies CP/SOB/LE  edema. Denies history of CVA/CAD or HF.. No documented past medical history of ACS, heart failure, pulmonary embolism, or malignant arrhythmia... On exam, vital signs WNL, no carotid murmur nor systolic murmur. Lungs clear without crackles. No JVD, or LE edema. EKG demonstrates sinus rhythm w/ LVH.  TTE to evaluate for endocarditis pending. RCRI score is 1, which estimates a 6% chance of mortality within 30 days of surgery. Pt optimized for medium risk procedure     # Type II DM- AIC- 6.3. Monitor BS  # DC planning. Time spent in dc planning and co-ordination is 33 mins    # DVT Prox- HSQ.

## 2021-12-14 NOTE — PROGRESS NOTE ADULT - SUBJECTIVE AND OBJECTIVE BOX
Orthopaedic Surgery Progress Note    Subjective:   Patient seen and examined  No acute events overnight  Denies numbness/tingling  NPOpMN for OR today at [facility]    Objective:  T(C): 36.6 (12-14-21 @ 04:43), Max: 37.4 (12-13-21 @ 21:57)  HR: 68 (12-14-21 @ 04:43) (66 - 69)  BP: 125/68 (12-14-21 @ 04:43) (113/64 - 126/71)  RR: 16 (12-14-21 @ 04:43) (16 - 18)  SpO2: 100% (12-14-21 @ 04:43) (100% - 100%)  Wt(kg): --      PE    NAD  Spine PE:  Skin intact  No gross deformity  No midline TTP C/T/L/S spine  No bony step offs  No paraspinal muscle ttp/hypertonicity   Negative Straight leg raise  Negative clonus/Babinski/tam  No saddle anesthesia    Motor:                   C5                C6              C7               C8           T1   R            5/5                5/5            5/5             5/5          5/5  L             5/5               5/5             5/5             5/5          5/5                L2             L3             L4               L5            S1  R         */5           5/5          5/5             5/5           5/5  L          */5          5/5           5/5             5/5           5/5    Patient unable to SLR due to pain    Sensory:            C5         C6         C7      C8       T1        (0=absent, 1=impaired, 2=normal, NT=not testable)  R         2            2           2        2         2  L          2            2           2        2         2               L2          L3         L4      L5       S1         (0=absent, 1=impaired, 2=normal, NT=not testable)  R         2            2            1        1        1  L          2            2           1        1         1    L4-S1 paresthesias bilaterally                 10.9   6.49  )-----------( 315      ( 13 Dec 2021 11:54 )             32.4     12-13    135  |  102  |  6<L>  ----------------------------<  132<H>  3.7   |  22  |  0.76    Ca    8.4      13 Dec 2021 11:54  Phos  2.5     12-13  Mg     1.70     12-13    TPro  6.7  /  Alb  2.6<L>  /  TBili  0.8  /  DBili  x   /  AST  20  /  ALT  44<H>  /  AlkPhos  101  12-13    PT/INR - ( 13 Dec 2021 06:35 )   PT: 13.8 sec;   INR: 1.22 ratio         PTT - ( 13 Dec 2021 06:35 )  PTT:27.8 sec              Imaging:  MRI C/T/L spine with contrast shows C4/5 osteomyelitis/discitis associated with canal narrowing and prevertebral phlegmon extending C2-C5. Also shows L3/4 osteomyelitis/discitis as well as large, broad-based L4/5 disc herniation    CT A/P shows left psoas abscess             A/P: 66y Male with osteomyelitis/discitis of cervical and lumbar spine with psoas abscess  - Pain control  - IV antibiotics  - PT/OT  - WBAT  - FU CT C-spine  - Plan for OR today at [facility] Orthopaedic Surgery Progress Note    Subjective:   Patient seen and examined  No acute events overnight  Denies numbness/tingling  NPOpMN for OR today at [facility]    Objective:  T(C): 36.6 (12-14-21 @ 04:43), Max: 37.4 (12-13-21 @ 21:57)  HR: 68 (12-14-21 @ 04:43) (66 - 69)  BP: 125/68 (12-14-21 @ 04:43) (113/64 - 126/71)  RR: 16 (12-14-21 @ 04:43) (16 - 18)  SpO2: 100% (12-14-21 @ 04:43) (100% - 100%)  Wt(kg): --      PE    NAD  Spine PE:  Skin intact  No gross deformity  No midline TTP C/T/L/S spine  No bony step offs  No paraspinal muscle ttp/hypertonicity   Negative Straight leg raise  Negative clonus/Babinski/tam  No saddle anesthesia    Motor:                   C5                C6              C7               C8           T1   R            5/5                5/5            5/5             4/5          4/5  L             5/5               5/5             5/5             4/5          4/5                L2             L3             L4               L5            S1  R         */5           5/5          5/5             5/5           5/5  L          */5          5/5           5/5             5/5           5/5    Patient unable to SLR due to pain    Sensory:            C5         C6         C7      C8       T1        (0=absent, 1=impaired, 2=normal, NT=not testable)  R         2            2           2        2         2  L          2            2           2        2         2               L2          L3         L4      L5       S1         (0=absent, 1=impaired, 2=normal, NT=not testable)  R         2            2            1        1        1  L          2            2           1        1         1    L4-S1 paresthesias bilaterally                 10.9   6.49  )-----------( 315      ( 13 Dec 2021 11:54 )             32.4     12-13    135  |  102  |  6<L>  ----------------------------<  132<H>  3.7   |  22  |  0.76    Ca    8.4      13 Dec 2021 11:54  Phos  2.5     12-13  Mg     1.70     12-13    TPro  6.7  /  Alb  2.6<L>  /  TBili  0.8  /  DBili  x   /  AST  20  /  ALT  44<H>  /  AlkPhos  101  12-13    PT/INR - ( 13 Dec 2021 06:35 )   PT: 13.8 sec;   INR: 1.22 ratio         PTT - ( 13 Dec 2021 06:35 )  PTT:27.8 sec              Imaging:  MRI C/T/L spine with contrast shows C4/5 osteomyelitis/discitis associated with canal narrowing and prevertebral phlegmon extending C2-C5. Also shows L3/4 osteomyelitis/discitis as well as large, broad-based L4/5 disc herniation    CT A/P shows left psoas abscess             A/P: 66y Male with osteomyelitis/discitis of cervical and lumbar spine with psoas abscess  - Pain control  - IV antibiotics  - PT/OT  - WBAT  - FU CT C-spine  - Plan for OR today at [facility]

## 2021-12-14 NOTE — PROGRESS NOTE ADULT - ASSESSMENT
65 yo M w/o any known PMH presented with worsening back pain w/ associated weakness found to have L psoas abscess, Staph Aureus bacteremia and osteomyelitis. 
65 yo M w/o any known PMH presented with worsening back pain w/ associated weakness found to have Staph aureus bacteremia in the setting of a L psoas abscess and cervical and lumbar osteomyelitis w/ developing phelgmon/abscess formation. Patient currently pending surgery as per ortho spine.

## 2021-12-14 NOTE — PROVIDER CONTACT NOTE (CRITICAL VALUE NOTIFICATION) - BACKGROUND
Patient admitted for dorsalgia with pmh of diabbtes mellitus, psoas abscess, and acute ostomyelitis.

## 2021-12-14 NOTE — PROGRESS NOTE ADULT - PROBLEM SELECTOR PLAN 5
Patient reports he was diagnosed w/ essential HTN, however takes no meds for this. BP while in patient are WNL  - Will trend BPs and control BP accordingly.

## 2021-12-14 NOTE — PROGRESS NOTE ADULT - PROBLEM SELECTOR PLAN 6
Diet: Consistent Carb  DVT: Lovenox (held for surgery)   Dispo: in patient - transfer to [facility] for surgery, afterwards patient will be transferred back to [facility] for further care since [facility] has no beds available.

## 2021-12-14 NOTE — PRE-ANESTHESIA EVALUATION ADULT - NSATTENDATTESTRD_GEN_ALL_CORE
The patient has been re-examined and I agree with the above assessment or I updated with my findings. Arm band on

## 2021-12-14 NOTE — PROVIDER CONTACT NOTE (CRITICAL VALUE NOTIFICATION) - ASSESSMENT
Patient is A*O times 4. Denied headache, dizziness, or any burning pain on urination. Asymptomatic urination. no acute stress noted.

## 2021-12-14 NOTE — H&P ADULT - HISTORY OF PRESENT ILLNESS
Orthopedic Surgery H&P    Patient is a 66y Male who presents c/o back pain for 1 month of progressively worsening back pain. Patient states that he woke up approximately 1 months ago with so severe that he was unable to walk. He feels that the inability to walk is due to pain rather than weakness. This pain became progressively worse. He endorses pain throughout his spine that also travels laterally across waist in the lower back. He reports paresthesias in both legs and perianal numbness since the pain began. He also began to experience fever. [place] [facility] a few days ago to seek medical care. Denies HS/LOC. Denies pain/injury elsewhere. Denies bowel/bladder incontinence. Denies IV drug use. Of note, the patient was seen in the ER at [facility] on 12/10 and had blood cultures done, which were positive for staph aureus. He returns because his symptoms did not improve. He reports that his symptoms have improved somewhat during this current hospitalization.     Patient transferred from [facility] to [facility] for OR today.     HEALTH ISSUES - PROBLEM Dx:  Bacteremia    Acute osteomyelitis    Psoas abscess    DVT prophylaxis    Acute UTI    Type 2 diabetes mellitus    Essential hypertension    Nutrition, metabolism, and development symptoms            MEDICATIONS  (STANDING):  dextrose 40% Gel 15 Gram(s) Oral once  dextrose 5%. 1000 milliLiter(s) IV Continuous <Continuous>  dextrose 5%. 1000 milliLiter(s) IV Continuous <Continuous>  dextrose 50% Injectable 25 Gram(s) IV Push once  dextrose 50% Injectable 12.5 Gram(s) IV Push once  dextrose 50% Injectable 25 Gram(s) IV Push once  glucagon  Injectable 1 milliGRAM(s) IntraMuscular once  heparin   Injectable 5000 Unit(s) SubCutaneous every 12 hours  insulin lispro (ADMELOG) corrective regimen sliding scale   SubCutaneous every 6 hours  lactated ringers. 1000 milliLiter(s) IV Continuous <Continuous>  piperacillin/tazobactam IVPB.. 3.375 Gram(s) IV Intermittent every 8 hours  vancomycin  IVPB 1000 milliGRAM(s) IV Intermittent every 12 hours      Allergies    No Known Allergies    Intolerances        PAST MEDICAL & SURGICAL HISTORY:  No pertinent past medical history    Diabetes mellitus    Essential hypertension    History of hemorrhoidectomy    Vital Signs Last 24 Hrs  T(C): 37.4 (14 Dec 2021 11:54), Max: 37.4 (13 Dec 2021 21:57)  T(F): 99.3 (14 Dec 2021 11:54), Max: 99.4 (13 Dec 2021 21:57)  HR: 67 (14 Dec 2021 11:54) (67 - 76)  BP: 162/89 (14 Dec 2021 11:54) (113/64 - 162/89)  BP(mean): --  RR: 18 (14 Dec 2021 11:54) (16 - 18)  SpO2: 99% (14 Dec 2021 11:54) (99% - 100%)    Gen: NAD    Spine PE:  Skin intact  No gross deformity  Midline TTP in proximal cervical spine, lumbar spine  No bony step offs  No paraspinal muscle ttp/hypertonicity   Positive straight leg raise bilaterally  Negative clonus  Negative babinski  Negative tam  + rectal tone  + saddle anesthesia    Motor:                   C5                C6              C7               C8           T1   R            5/5                5/5            5/5             5/5          5/5  L             5/5               5/5             5/5             5/5          5/5                L2             L3             L4               L5            S1  R         */5           5/5          5/5             5/5           5/5  L          */5          5/5           5/5             5/5           5/5    Patient unable to SLR due to pain    Sensory:            C5         C6         C7      C8       T1        (0=absent, 1=impaired, 2=normal, NT=not testable)  R         2            2           2        2         2  L          2            2           2        2         2               L2          L3         L4      L5       S1         (0=absent, 1=impaired, 2=normal, NT=not testable)  R         2            2            1        1        1  L          2            2           1        1         1    L4-S1 paresthesias bilaterally    Imaging:  MRI C/T/L spine with contrast shows C4/5 osteomyelitis/discitis associated with canal narrowing and prevertebral phlegmon extending C2-C5. Also shows L3/4 osteomyelitis/discitis as well as large, broad-based L4/5 disc herniation    CT A/P shows left psoas abscess         Assessment and Recommendation:   · Assessment	  A/P: 66y Male with osteomyelitis/discitis of cervical and lumbar spine with psoas abscess. Plan for OR today to address critical cervical stenosis.     Pain control  IV antibiotics  ID consult  PT/OT  WBAT  NPO/IVF pOR Orthopedic Surgery H&P    Patient is a 66y Male who presents c/o back pain for 1 month of progressively worsening back pain. Patient states that he woke up approximately 1 months ago with so severe that he was unable to walk. He feels that the inability to walk is due to pain rather than weakness. This pain became progressively worse. He endorses pain throughout his spine that also travels laterally across waist in the lower back. He reports paresthesias in both legs and perianal numbness since the pain began. He also began to experience fever. [place] [facility] a few days ago to seek medical care. Denies HS/LOC. Denies pain/injury elsewhere. Denies bowel/bladder incontinence. Denies IV drug use. Of note, the patient was seen in the ER at [facility] on 12/10 and had blood cultures done, which were positive for staph aureus. He returns because his symptoms did not improve. He reports that his symptoms have improved somewhat during this current hospitalization.     Patient transferred from [facility] to [facility] for OR today.     HEALTH ISSUES - PROBLEM Dx:  Bacteremia    Acute osteomyelitis    Psoas abscess    DVT prophylaxis    Acute UTI    Type 2 diabetes mellitus    Essential hypertension    Nutrition, metabolism, and development symptoms            MEDICATIONS  (STANDING):  dextrose 40% Gel 15 Gram(s) Oral once  dextrose 5%. 1000 milliLiter(s) IV Continuous <Continuous>  dextrose 5%. 1000 milliLiter(s) IV Continuous <Continuous>  dextrose 50% Injectable 25 Gram(s) IV Push once  dextrose 50% Injectable 12.5 Gram(s) IV Push once  dextrose 50% Injectable 25 Gram(s) IV Push once  glucagon  Injectable 1 milliGRAM(s) IntraMuscular once  heparin   Injectable 5000 Unit(s) SubCutaneous every 12 hours  insulin lispro (ADMELOG) corrective regimen sliding scale   SubCutaneous every 6 hours  lactated ringers. 1000 milliLiter(s) IV Continuous <Continuous>  piperacillin/tazobactam IVPB.. 3.375 Gram(s) IV Intermittent every 8 hours  vancomycin  IVPB 1000 milliGRAM(s) IV Intermittent every 12 hours      Allergies    No Known Allergies    Intolerances        PAST MEDICAL & SURGICAL HISTORY:  No pertinent past medical history    Diabetes mellitus    Essential hypertension    History of hemorrhoidectomy    Vital Signs Last 24 Hrs  T(C): 37.4 (14 Dec 2021 11:54), Max: 37.4 (13 Dec 2021 21:57)  T(F): 99.3 (14 Dec 2021 11:54), Max: 99.4 (13 Dec 2021 21:57)  HR: 67 (14 Dec 2021 11:54) (67 - 76)  BP: 162/89 (14 Dec 2021 11:54) (113/64 - 162/89)  BP(mean): --  RR: 18 (14 Dec 2021 11:54) (16 - 18)  SpO2: 99% (14 Dec 2021 11:54) (99% - 100%)    Gen: NAD    Spine PE:  Skin intact  No gross deformity  Midline TTP in proximal cervical spine, lumbar spine  No bony step offs  No paraspinal muscle ttp/hypertonicity   Positive straight leg raise bilaterally  Negative clonus  Negative babinski  Positive tam's bilaterally  + rectal tone  + saddle anesthesia    Motor:                   C5                C6              C7               C8           T1   R            5/5                5/5            5/5             4/5          4/5  L             5/5               5/5             5/5             4/5          4/5                L2             L3             L4               L5            S1  R         */5           5/5          5/5             5/5           5/5  L          */5          5/5           5/5             5/5           5/5    Patient unable to SLR due to pain    Sensory:            C5         C6         C7      C8       T1        (0=absent, 1=impaired, 2=normal, NT=not testable)  R         2            2           2        2         2  L          2            2           2        2         2               L2          L3         L4      L5       S1         (0=absent, 1=impaired, 2=normal, NT=not testable)  R         2            2            1        1        1  L          2            2           1        1         1    L4-S1 paresthesias bilaterally    Imaging:  MRI C/T/L spine with contrast shows C4/5 osteomyelitis/discitis associated with canal narrowing and prevertebral phlegmon extending C2-C5. Also shows L3/4 osteomyelitis/discitis as well as large, broad-based L4/5 disc herniation    CT A/P shows left psoas abscess         Assessment and Recommendation:   · Assessment	  A/P: 66y Male with osteomyelitis/discitis of cervical and lumbar spine with psoas abscess. Plan for OR today to address critical cervical stenosis.     Pain control  IV antibiotics  ID consult  PT/OT  WBAT  NPO/IVF pOR

## 2021-12-14 NOTE — PROGRESS NOTE ADULT - SUBJECTIVE AND OBJECTIVE BOX
Hesham Jerez MD  Internal Medicine, PGY-1  Pager: [phone] / [facility]: [phone]    SUBJECTIVE / OVERNIGHT EVENTS:  - Pt seen and examined at bedside  - GENTRY  - Patient complains of persistent back pain and weakness. Got 1x dialudid overnight. The patient denies any fevers, chills, nausea, vomiting.     MEDICATIONS  (STANDING):  influenza  Vaccine (HIGH DOSE) 0.7 milliLiter(s) IntraMuscular once  lactated ringers. 1000 milliLiter(s) (100 mL/Hr) IV Continuous <Continuous>  nafcillin  IVPB      nafcillin  IVPB 2 Gram(s) IV Intermittent every 4 hours    MEDICATIONS  (PRN):  acetaminophen     Tablet .. 650 milliGRAM(s) Oral every 6 hours PRN Temp greater or equal to 38C (100.4F), Mild Pain (1 - 3)  aluminum hydroxide/magnesium hydroxide/simethicone Suspension 30 milliLiter(s) Oral every 4 hours PRN Dyspepsia  bisacodyl 5 milliGRAM(s) Oral at bedtime PRN Constipation  HYDROmorphone  Injectable 1 milliGRAM(s) IV Push every 4 hours PRN Moderate Pain (4 - 6) or Severe Pain (7 - 10)  melatonin 3 milliGRAM(s) Oral at bedtime PRN Insomnia  ondansetron Injectable 4 milliGRAM(s) IV Push every 8 hours PRN Nausea and/or Vomiting      PHYSICAL EXAM:  Vital Signs Last 24 Hrs  T(C): 36.6 (14 Dec 2021 04:43), Max: 37.4 (13 Dec 2021 21:57)  T(F): 97.9 (14 Dec 2021 04:43), Max: 99.4 (13 Dec 2021 21:57)  HR: 68 (14 Dec 2021 04:43) (66 - 69)  BP: 125/68 (14 Dec 2021 04:43) (113/64 - 126/71)  BP(mean): --  RR: 16 (14 Dec 2021 04:43) (16 - 18)  SpO2: 100% (14 Dec 2021 04:43) (100% - 100%)    CAPILLARY BLOOD GLUCOSE      POCT Blood Glucose.: 78 mg/dL (14 Dec 2021 05:32)  POCT Blood Glucose.: 104 mg/dL (13 Dec 2021 23:25)  POCT Blood Glucose.: 102 mg/dL (13 Dec 2021 17:35)  POCT Blood Glucose.: 95 mg/dL (13 Dec 2021 12:01)    I&O's Summary      CONSTITUTIONAL: NAD, well-developed  RESPIRATORY: Normal respiratory effort; lungs are clear to auscultation bilaterally  CARDIOVASCULAR: Regular rate and rhythm, normal S1 and S2, no murmur/rub/gallop; No lower extremity edema; Peripheral pulses are 2+ bilaterally  ABDOMEN: Nontender to palpation, normoactive bowel sounds, no rebound/guarding; No hepatosplenomegaly  MUSCLOSKELETAL:  3/5 strength on b/l LE, Diffuse TTP throughout back   PSYCH: A+O to person, place, and time; affect appropriate    LABS:                        11.8   4.52  )-----------( 236      ( 14 Dec 2021 07:16 )             37.4     12-13    135  |  102  |  6<L>  ----------------------------<  132<H>  3.7   |  22  |  0.76    Ca    8.4      13 Dec 2021 11:54  Phos  2.5     12-13  Mg     1.70     12-13    TPro  6.7  /  Alb  2.6<L>  /  TBili  0.8  /  DBili  x   /  AST  20  /  ALT  44<H>  /  AlkPhos  101  12-13    PT/INR - ( 14 Dec 2021 07:16 )   PT: 13.3 sec;   INR: 1.16 ratio         PTT - ( 14 Dec 2021 07:16 )  PTT:30.1 sec          Culture - Blood (collected 12 Dec 2021 16:22)  Source: .Blood Blood-Venous  Preliminary Report (13 Dec 2021 17:02):    No growth to date.    Culture - Blood (collected 12 Dec 2021 16:22)  Source: .Blood Blood-Venous  Preliminary Report (13 Dec 2021 17:02):    No growth to date.     Hesham Jerez MD  Internal Medicine, PGY-1  Pager: [phone] / [facility]: [phone]    SUBJECTIVE / OVERNIGHT EVENTS:  - Pt seen and examined at bedside  - GENTRY  - Patient complains of persistent back pain and weakness. Got 1x dialudid overnight. The patient denies any fevers, chills, nausea, vomiting.     MEDICATIONS  (STANDING):  influenza  Vaccine (HIGH DOSE) 0.7 milliLiter(s) IntraMuscular once  lactated ringers. 1000 milliLiter(s) (100 mL/Hr) IV Continuous <Continuous>  nafcillin  IVPB      nafcillin  IVPB 2 Gram(s) IV Intermittent every 4 hours    MEDICATIONS  (PRN):  acetaminophen     Tablet .. 650 milliGRAM(s) Oral every 6 hours PRN Temp greater or equal to 38C (100.4F), Mild Pain (1 - 3)  aluminum hydroxide/magnesium hydroxide/simethicone Suspension 30 milliLiter(s) Oral every 4 hours PRN Dyspepsia  bisacodyl 5 milliGRAM(s) Oral at bedtime PRN Constipation  HYDROmorphone  Injectable 1 milliGRAM(s) IV Push every 4 hours PRN Moderate Pain (4 - 6) or Severe Pain (7 - 10)  melatonin 3 milliGRAM(s) Oral at bedtime PRN Insomnia  ondansetron Injectable 4 milliGRAM(s) IV Push every 8 hours PRN Nausea and/or Vomiting      PHYSICAL EXAM:  Vital Signs Last 24 Hrs  T(C): 36.6 (14 Dec 2021 04:43), Max: 37.4 (13 Dec 2021 21:57)  T(F): 97.9 (14 Dec 2021 04:43), Max: 99.4 (13 Dec 2021 21:57)  HR: 68 (14 Dec 2021 04:43) (66 - 69)  BP: 125/68 (14 Dec 2021 04:43) (113/64 - 126/71)  BP(mean): --  RR: 16 (14 Dec 2021 04:43) (16 - 18)  SpO2: 100% (14 Dec 2021 04:43) (100% - 100%)    CAPILLARY BLOOD GLUCOSE      POCT Blood Glucose.: 78 mg/dL (14 Dec 2021 05:32)  POCT Blood Glucose.: 104 mg/dL (13 Dec 2021 23:25)  POCT Blood Glucose.: 102 mg/dL (13 Dec 2021 17:35)  POCT Blood Glucose.: 95 mg/dL (13 Dec 2021 12:01)    I&O's Summary      CONSTITUTIONAL: NAD, well-developed  RESPIRATORY: Normal respiratory effort; lungs are clear to auscultation bilaterally  CARDIOVASCULAR: Regular rate and rhythm, normal S1 and S2, no murmur/rub/gallop; No lower extremity edema; Peripheral pulses are 2+ bilaterally  ABDOMEN: Nontender to palpation, normoactive bowel sounds, no rebound/guarding; No hepatosplenomegaly  MUSCLOSKELETAL:  3/5 strength on b/l LE, Diffuse TTP throughout back   PSYCH: A+O to person, place, and time; affect appropriate    LABS:                        11.8   4.52  )-----------( 236      ( 14 Dec 2021 07:16 )             37.4     12-13    135  |  102  |  6<L>  ----------------------------<  132<H>  3.7   |  22  |  0.76    Ca    8.4      13 Dec 2021 11:54  Phos  2.5     12-13  Mg     1.70     12-13    TPro  6.7  /  Alb  2.6<L>  /  TBili  0.8  /  DBili  x   /  AST  20  /  ALT  44<H>  /  AlkPhos  101  12-13    PT/INR - ( 14 Dec 2021 07:16 )   PT: 13.3 sec;   INR: 1.16 ratio         PTT - ( 14 Dec 2021 07:16 )  PTT:30.1 sec          Culture - Blood (collected 12 Dec 2021 16:22)  Source: .Blood Blood-Venous  Preliminary Report (13 Dec 2021 17:02):    No growth to date.    Culture - Blood (collected 12 Dec 2021 16:22)  Source: .Blood Blood-Venous  Preliminary Report (13 Dec 2021 17:02):    No growth to date.    IMAGING:    TTE:    1. Mitral annular calcification, otherwise normal mitral  valve. Minimal mitral regurgitation.  2. Normal left ventricular internal dimensions and wall  thicknesses.  3. Endocardium not well visualized; grossly normal left  ventricular systolic function.  4. Normal left ventricular diastolic function.  5. The right ventricle is not well visualized; grossly  normal right ventricular systolic function.  Unable to exclude endocarditis.  Consider GIGI for further  evaluation, if clinically indicated.

## 2021-12-14 NOTE — PROGRESS NOTE ADULT - PROBLEM SELECTOR PLAN 2
Presented with severe pain of the back, inability to ambulate due to pain  CT abd pelvis showed L psoas abscess.  MRI showed psoas abscess bilaterally.  Bacteremia with Staph aureus and Staph epidermidis (though staph epidermidis is likely contaminant).    -IR consulted for possible psoas abscess drainage - evaluated and found abscess to be too small to drain.   -Abx as above  -F/u BCx as above

## 2021-12-15 ENCOUNTER — TRANSCRIPTION ENCOUNTER (OUTPATIENT)
Age: 66
End: 2021-12-15

## 2021-12-15 ENCOUNTER — INPATIENT (INPATIENT)
Facility: HOSPITAL | Age: 66
LOS: 28 days | Discharge: HOME | End: 2022-01-13
Attending: STUDENT IN AN ORGANIZED HEALTH CARE EDUCATION/TRAINING PROGRAM | Admitting: STUDENT IN AN ORGANIZED HEALTH CARE EDUCATION/TRAINING PROGRAM
Payer: MEDICAID

## 2021-12-15 VITALS
HEART RATE: 65 BPM | TEMPERATURE: 99 F | DIASTOLIC BLOOD PRESSURE: 88 MMHG | WEIGHT: 128.09 LBS | HEIGHT: 71 IN | OXYGEN SATURATION: 100 % | RESPIRATION RATE: 18 BRPM | SYSTOLIC BLOOD PRESSURE: 153 MMHG

## 2021-12-15 VITALS — HEART RATE: 73 BPM

## 2021-12-15 DIAGNOSIS — B99.9 UNSPECIFIED INFECTIOUS DISEASE: ICD-10-CM

## 2021-12-15 DIAGNOSIS — Z98.890 OTHER SPECIFIED POSTPROCEDURAL STATES: Chronic | ICD-10-CM

## 2021-12-15 LAB
ANION GAP SERPL CALC-SCNC: 11 MMOL/L — SIGNIFICANT CHANGE UP (ref 5–17)
ANION GAP SERPL CALC-SCNC: 13 MMOL/L — SIGNIFICANT CHANGE UP (ref 5–17)
BASOPHILS # BLD AUTO: 0.01 K/UL — SIGNIFICANT CHANGE UP (ref 0–0.2)
BASOPHILS # BLD AUTO: 0.02 K/UL — SIGNIFICANT CHANGE UP (ref 0–0.2)
BASOPHILS NFR BLD AUTO: 0.1 % — SIGNIFICANT CHANGE UP (ref 0–2)
BASOPHILS NFR BLD AUTO: 0.2 % — SIGNIFICANT CHANGE UP (ref 0–2)
BUN SERPL-MCNC: 8 MG/DL — SIGNIFICANT CHANGE UP (ref 7–23)
BUN SERPL-MCNC: 8 MG/DL — SIGNIFICANT CHANGE UP (ref 7–23)
CALCIUM SERPL-MCNC: 8 MG/DL — LOW (ref 8.4–10.5)
CALCIUM SERPL-MCNC: 8.5 MG/DL — SIGNIFICANT CHANGE UP (ref 8.4–10.5)
CHLORIDE SERPL-SCNC: 103 MMOL/L — SIGNIFICANT CHANGE UP (ref 96–108)
CHLORIDE SERPL-SCNC: 104 MMOL/L — SIGNIFICANT CHANGE UP (ref 96–108)
CO2 SERPL-SCNC: 20 MMOL/L — LOW (ref 22–31)
CO2 SERPL-SCNC: 23 MMOL/L — SIGNIFICANT CHANGE UP (ref 22–31)
CREAT SERPL-MCNC: 0.72 MG/DL — SIGNIFICANT CHANGE UP (ref 0.5–1.3)
CREAT SERPL-MCNC: 0.76 MG/DL — SIGNIFICANT CHANGE UP (ref 0.5–1.3)
EOSINOPHIL # BLD AUTO: 0 K/UL — SIGNIFICANT CHANGE UP (ref 0–0.5)
EOSINOPHIL # BLD AUTO: 0.04 K/UL — SIGNIFICANT CHANGE UP (ref 0–0.5)
EOSINOPHIL NFR BLD AUTO: 0 % — SIGNIFICANT CHANGE UP (ref 0–6)
EOSINOPHIL NFR BLD AUTO: 0.4 % — SIGNIFICANT CHANGE UP (ref 0–6)
GLUCOSE BLDC GLUCOMTR-MCNC: 105 MG/DL — HIGH (ref 70–99)
GLUCOSE BLDC GLUCOMTR-MCNC: 110 MG/DL — HIGH (ref 70–99)
GLUCOSE BLDC GLUCOMTR-MCNC: 83 MG/DL — SIGNIFICANT CHANGE UP (ref 70–99)
GLUCOSE SERPL-MCNC: 147 MG/DL — HIGH (ref 70–99)
GLUCOSE SERPL-MCNC: 81 MG/DL — SIGNIFICANT CHANGE UP (ref 70–99)
HCT VFR BLD CALC: 29.7 % — LOW (ref 39–50)
HCT VFR BLD CALC: 32.2 % — LOW (ref 39–50)
HGB BLD-MCNC: 10.7 G/DL — LOW (ref 13–17)
HGB BLD-MCNC: 9.9 G/DL — LOW (ref 13–17)
IMM GRANULOCYTES NFR BLD AUTO: 0.6 % — SIGNIFICANT CHANGE UP (ref 0–1.5)
IMM GRANULOCYTES NFR BLD AUTO: 0.7 % — SIGNIFICANT CHANGE UP (ref 0–1.5)
LYMPHOCYTES # BLD AUTO: 0.7 K/UL — LOW (ref 1–3.3)
LYMPHOCYTES # BLD AUTO: 1.98 K/UL — SIGNIFICANT CHANGE UP (ref 1–3.3)
LYMPHOCYTES # BLD AUTO: 20.6 % — SIGNIFICANT CHANGE UP (ref 13–44)
LYMPHOCYTES # BLD AUTO: 7.2 % — LOW (ref 13–44)
MCHC RBC-ENTMCNC: 29.9 PG — SIGNIFICANT CHANGE UP (ref 27–34)
MCHC RBC-ENTMCNC: 30.5 PG — SIGNIFICANT CHANGE UP (ref 27–34)
MCHC RBC-ENTMCNC: 33.2 GM/DL — SIGNIFICANT CHANGE UP (ref 32–36)
MCHC RBC-ENTMCNC: 33.3 GM/DL — SIGNIFICANT CHANGE UP (ref 32–36)
MCV RBC AUTO: 89.7 FL — SIGNIFICANT CHANGE UP (ref 80–100)
MCV RBC AUTO: 91.7 FL — SIGNIFICANT CHANGE UP (ref 80–100)
MONOCYTES # BLD AUTO: 0.29 K/UL — SIGNIFICANT CHANGE UP (ref 0–0.9)
MONOCYTES # BLD AUTO: 0.59 K/UL — SIGNIFICANT CHANGE UP (ref 0–0.9)
MONOCYTES NFR BLD AUTO: 3 % — SIGNIFICANT CHANGE UP (ref 2–14)
MONOCYTES NFR BLD AUTO: 6.1 % — SIGNIFICANT CHANGE UP (ref 2–14)
NEUTROPHILS # BLD AUTO: 6.91 K/UL — SIGNIFICANT CHANGE UP (ref 1.8–7.4)
NEUTROPHILS # BLD AUTO: 8.68 K/UL — HIGH (ref 1.8–7.4)
NEUTROPHILS NFR BLD AUTO: 72.1 % — SIGNIFICANT CHANGE UP (ref 43–77)
NEUTROPHILS NFR BLD AUTO: 89 % — HIGH (ref 43–77)
NRBC # BLD: 0 /100 WBCS — SIGNIFICANT CHANGE UP (ref 0–0)
NRBC # BLD: 0 /100 WBCS — SIGNIFICANT CHANGE UP (ref 0–0)
PLATELET # BLD AUTO: 281 K/UL — SIGNIFICANT CHANGE UP (ref 150–400)
PLATELET # BLD AUTO: 310 K/UL — SIGNIFICANT CHANGE UP (ref 150–400)
POTASSIUM SERPL-MCNC: 3.6 MMOL/L — SIGNIFICANT CHANGE UP (ref 3.5–5.3)
POTASSIUM SERPL-MCNC: 3.7 MMOL/L — SIGNIFICANT CHANGE UP (ref 3.5–5.3)
POTASSIUM SERPL-SCNC: 3.6 MMOL/L — SIGNIFICANT CHANGE UP (ref 3.5–5.3)
POTASSIUM SERPL-SCNC: 3.7 MMOL/L — SIGNIFICANT CHANGE UP (ref 3.5–5.3)
RBC # BLD: 3.31 M/UL — LOW (ref 4.2–5.8)
RBC # BLD: 3.51 M/UL — LOW (ref 4.2–5.8)
RBC # FLD: 14.8 % — HIGH (ref 10.3–14.5)
RBC # FLD: 15.1 % — HIGH (ref 10.3–14.5)
SODIUM SERPL-SCNC: 137 MMOL/L — SIGNIFICANT CHANGE UP (ref 135–145)
SODIUM SERPL-SCNC: 137 MMOL/L — SIGNIFICANT CHANGE UP (ref 135–145)
WBC # BLD: 9.6 K/UL — SIGNIFICANT CHANGE UP (ref 3.8–10.5)
WBC # BLD: 9.75 K/UL — SIGNIFICANT CHANGE UP (ref 3.8–10.5)
WBC # FLD AUTO: 9.6 K/UL — SIGNIFICANT CHANGE UP (ref 3.8–10.5)
WBC # FLD AUTO: 9.75 K/UL — SIGNIFICANT CHANGE UP (ref 3.8–10.5)

## 2021-12-15 PROCEDURE — 99233 SBSQ HOSP IP/OBS HIGH 50: CPT | Mod: GC

## 2021-12-15 RX ORDER — SODIUM CHLORIDE 9 MG/ML
1000 INJECTION, SOLUTION INTRAVENOUS
Refills: 0 | Status: DISCONTINUED | OUTPATIENT
Start: 2021-12-15 | End: 2021-12-15

## 2021-12-15 RX ORDER — ACETAMINOPHEN 500 MG
1 TABLET ORAL
Qty: 0 | Refills: 0 | DISCHARGE
Start: 2021-12-15

## 2021-12-15 RX ORDER — INSULIN LISPRO 100/ML
VIAL (ML) SUBCUTANEOUS AT BEDTIME
Refills: 0 | Status: DISCONTINUED | OUTPATIENT
Start: 2021-12-15 | End: 2021-12-15

## 2021-12-15 RX ORDER — PANTOPRAZOLE SODIUM 20 MG/1
1 TABLET, DELAYED RELEASE ORAL
Qty: 0 | Refills: 0 | DISCHARGE
Start: 2021-12-15

## 2021-12-15 RX ORDER — NAFCILLIN 10 G/100ML
2 INJECTION, POWDER, FOR SOLUTION INTRAVENOUS EVERY 4 HOURS
Refills: 0 | Status: DISCONTINUED | OUTPATIENT
Start: 2021-12-15 | End: 2022-01-13

## 2021-12-15 RX ORDER — ONDANSETRON 8 MG/1
4 TABLET, FILM COATED ORAL ONCE
Refills: 0 | Status: DISCONTINUED | OUTPATIENT
Start: 2021-12-15 | End: 2021-12-15

## 2021-12-15 RX ORDER — TRAMADOL HYDROCHLORIDE 50 MG/1
100 TABLET ORAL EVERY 6 HOURS
Refills: 0 | Status: DISCONTINUED | OUTPATIENT
Start: 2021-12-15 | End: 2021-12-15

## 2021-12-15 RX ORDER — DEXTROSE 50 % IN WATER 50 %
15 SYRINGE (ML) INTRAVENOUS ONCE
Refills: 0 | Status: DISCONTINUED | OUTPATIENT
Start: 2021-12-15 | End: 2021-12-15

## 2021-12-15 RX ORDER — CYCLOBENZAPRINE HYDROCHLORIDE 10 MG/1
5 TABLET, FILM COATED ORAL EVERY 8 HOURS
Refills: 0 | Status: DISCONTINUED | OUTPATIENT
Start: 2021-12-15 | End: 2022-01-13

## 2021-12-15 RX ORDER — GLUCAGON INJECTION, SOLUTION 0.5 MG/.1ML
1 INJECTION, SOLUTION SUBCUTANEOUS ONCE
Refills: 0 | Status: DISCONTINUED | OUTPATIENT
Start: 2021-12-15 | End: 2021-12-15

## 2021-12-15 RX ORDER — CYCLOBENZAPRINE HYDROCHLORIDE 10 MG/1
1 TABLET, FILM COATED ORAL
Qty: 0 | Refills: 0 | DISCHARGE
Start: 2021-12-15

## 2021-12-15 RX ORDER — NAFCILLIN 10 G/100ML
2 INJECTION, POWDER, FOR SOLUTION INTRAVENOUS ONCE
Refills: 0 | Status: COMPLETED | OUTPATIENT
Start: 2021-12-15 | End: 2021-12-15

## 2021-12-15 RX ORDER — HYDROMORPHONE HYDROCHLORIDE 2 MG/ML
0.25 INJECTION INTRAMUSCULAR; INTRAVENOUS; SUBCUTANEOUS
Refills: 0 | Status: DISCONTINUED | OUTPATIENT
Start: 2021-12-15 | End: 2021-12-15

## 2021-12-15 RX ORDER — NAFCILLIN 10 G/100ML
INJECTION, POWDER, FOR SOLUTION INTRAVENOUS
Refills: 0 | Status: DISCONTINUED | OUTPATIENT
Start: 2021-12-15 | End: 2022-01-13

## 2021-12-15 RX ORDER — SENNA PLUS 8.6 MG/1
2 TABLET ORAL
Qty: 0 | Refills: 0 | DISCHARGE
Start: 2021-12-15

## 2021-12-15 RX ORDER — TRAMADOL HYDROCHLORIDE 50 MG/1
1 TABLET ORAL
Qty: 0 | Refills: 0 | DISCHARGE
Start: 2021-12-15

## 2021-12-15 RX ORDER — SODIUM CHLORIDE 9 MG/ML
1000 INJECTION INTRAMUSCULAR; INTRAVENOUS; SUBCUTANEOUS
Qty: 0 | Refills: 0 | DISCHARGE
Start: 2021-12-15

## 2021-12-15 RX ORDER — ACETAMINOPHEN 500 MG
650 TABLET ORAL EVERY 6 HOURS
Refills: 0 | Status: DISCONTINUED | OUTPATIENT
Start: 2021-12-15 | End: 2022-01-13

## 2021-12-15 RX ORDER — TRAMADOL HYDROCHLORIDE 50 MG/1
2 TABLET ORAL
Qty: 0 | Refills: 0 | DISCHARGE
Start: 2021-12-15

## 2021-12-15 RX ORDER — SENNA PLUS 8.6 MG/1
2 TABLET ORAL AT BEDTIME
Refills: 0 | Status: DISCONTINUED | OUTPATIENT
Start: 2021-12-15 | End: 2022-01-13

## 2021-12-15 RX ORDER — TRAMADOL HYDROCHLORIDE 50 MG/1
50 TABLET ORAL EVERY 6 HOURS
Refills: 0 | Status: DISCONTINUED | OUTPATIENT
Start: 2021-12-15 | End: 2021-12-16

## 2021-12-15 RX ORDER — DEXTROSE 50 % IN WATER 50 %
25 SYRINGE (ML) INTRAVENOUS ONCE
Refills: 0 | Status: DISCONTINUED | OUTPATIENT
Start: 2021-12-15 | End: 2021-12-15

## 2021-12-15 RX ORDER — HYDROMORPHONE HYDROCHLORIDE 2 MG/ML
0.5 INJECTION INTRAMUSCULAR; INTRAVENOUS; SUBCUTANEOUS
Refills: 0 | Status: DISCONTINUED | OUTPATIENT
Start: 2021-12-15 | End: 2021-12-15

## 2021-12-15 RX ORDER — MAGNESIUM HYDROXIDE 400 MG/1
30 TABLET, CHEWABLE ORAL
Qty: 0 | Refills: 0 | DISCHARGE
Start: 2021-12-15

## 2021-12-15 RX ORDER — PANTOPRAZOLE SODIUM 20 MG/1
40 TABLET, DELAYED RELEASE ORAL
Refills: 0 | Status: DISCONTINUED | OUTPATIENT
Start: 2021-12-15 | End: 2022-01-13

## 2021-12-15 RX ORDER — INSULIN LISPRO 100/ML
VIAL (ML) SUBCUTANEOUS
Refills: 0 | Status: DISCONTINUED | OUTPATIENT
Start: 2021-12-15 | End: 2021-12-15

## 2021-12-15 RX ORDER — INFLUENZA VIRUS VACCINE 15; 15; 15; 15 UG/.5ML; UG/.5ML; UG/.5ML; UG/.5ML
0.7 SUSPENSION INTRAMUSCULAR ONCE
Refills: 0 | Status: DISCONTINUED | OUTPATIENT
Start: 2021-12-15 | End: 2021-12-15

## 2021-12-15 RX ORDER — SODIUM CHLORIDE 9 MG/ML
1000 INJECTION, SOLUTION INTRAVENOUS
Refills: 0 | Status: DISCONTINUED | OUTPATIENT
Start: 2021-12-15 | End: 2021-12-19

## 2021-12-15 RX ORDER — DEXTROSE 50 % IN WATER 50 %
12.5 SYRINGE (ML) INTRAVENOUS ONCE
Refills: 0 | Status: DISCONTINUED | OUTPATIENT
Start: 2021-12-15 | End: 2021-12-15

## 2021-12-15 RX ORDER — NAFCILLIN 10 G/100ML
2 INJECTION, POWDER, FOR SOLUTION INTRAVENOUS
Qty: 0 | Refills: 0 | DISCHARGE
Start: 2021-12-15

## 2021-12-15 RX ADMIN — Medication 500 MILLIGRAM(S): at 07:39

## 2021-12-15 RX ADMIN — SODIUM CHLORIDE 75 MILLILITER(S): 9 INJECTION, SOLUTION INTRAVENOUS at 18:36

## 2021-12-15 RX ADMIN — Medication 500 MILLIGRAM(S): at 12:02

## 2021-12-15 RX ADMIN — PANTOPRAZOLE SODIUM 40 MILLIGRAM(S): 20 TABLET, DELAYED RELEASE ORAL at 21:47

## 2021-12-15 RX ADMIN — Medication 500 MILLIGRAM(S): at 06:01

## 2021-12-15 RX ADMIN — CYCLOBENZAPRINE HYDROCHLORIDE 5 MILLIGRAM(S): 10 TABLET, FILM COATED ORAL at 06:01

## 2021-12-15 RX ADMIN — TRAMADOL HYDROCHLORIDE 50 MILLIGRAM(S): 50 TABLET ORAL at 23:38

## 2021-12-15 RX ADMIN — TRAMADOL HYDROCHLORIDE 100 MILLIGRAM(S): 50 TABLET ORAL at 18:22

## 2021-12-15 RX ADMIN — SODIUM CHLORIDE 75 MILLILITER(S): 9 INJECTION, SOLUTION INTRAVENOUS at 21:48

## 2021-12-15 RX ADMIN — TRAMADOL HYDROCHLORIDE 100 MILLIGRAM(S): 50 TABLET ORAL at 18:52

## 2021-12-15 RX ADMIN — PANTOPRAZOLE SODIUM 40 MILLIGRAM(S): 20 TABLET, DELAYED RELEASE ORAL at 06:01

## 2021-12-15 RX ADMIN — NAFCILLIN 200 GRAM(S): 10 INJECTION, POWDER, FOR SOLUTION INTRAVENOUS at 21:48

## 2021-12-15 RX ADMIN — NAFCILLIN 200 GRAM(S): 10 INJECTION, POWDER, FOR SOLUTION INTRAVENOUS at 18:36

## 2021-12-15 RX ADMIN — SENNA PLUS 2 TABLET(S): 8.6 TABLET ORAL at 21:47

## 2021-12-15 NOTE — OCCUPATIONAL THERAPY INITIAL EVALUATION ADULT - PRECAUTIONS/LIMITATIONS, REHAB EVAL
(CONT) He endorses pain throughout his spine that also travels laterally across waist in the lower back. He reports paresthesias in both legs and perianal numbness since the pain began. He also began to experience fever. He arrived to the  from [facility] a few days ago to seek medical care. Denies HS/LOC. Denies pain/injury elsewhere. Denies bowel/bladder incontinence. Denies IV drug use. Of note, the patient was seen in the ER at [facility] on 12/10 and had blood cultures done, which were positive for staph aureus. He returns because his symptoms did not improve. He reports that his symptoms have improved somewhat during this current hospitalization. s/p C3-C6 lami and PSF/fall precautions/spinal precautions

## 2021-12-15 NOTE — DISCHARGE NOTE PROVIDER - EXTENDED VTE YES NO FOR MLM ENOXAPARIN
[FreeTextEntry1] : 1)  Poorly controlled type 2 diabetes- The patient be more careful with his diet going forward and begin checking his blood sugars.  He will follow up in 3 months and bring in his glucose meter.   He'll call if his sugars are unusually high. Continue Toujeo to 34 - 40  U , glimepiride to 1 mg one tab daily and metformin. Cannot afford GLP 1 and SGLT 2 now. . To meet with dietician. \par Discussed lowering carb intake in meals and incorporating more protein. Will meet with the dietician. AM reading OK so will not change insulin dose.  Due to insurance nay need to change Toujeo to Tresiba,\par 2)hyperlipidemia doing well \par 3) Hypertension - stable on today's visit\par 4) Obesity - encourage a healthy lifestyle, Since he's been eating healthier he has lost some weight.\par f/u CDE and dietician \par Labs done today \par f/u 3 months 
,

## 2021-12-15 NOTE — OCCUPATIONAL THERAPY INITIAL EVALUATION ADULT - PERTINENT HX OF CURRENT PROBLEM, REHAB EVAL
66M who presents c/o back pain for 1 month of progressively worsening back pain. Patient states that he woke up approximately 1 months ago with so severe that he was unable to walk. He feels that the inability to walk is due to pain rather than weakness. This pain became progressively worse. (CONT BELOW)

## 2021-12-15 NOTE — OCCUPATIONAL THERAPY INITIAL EVALUATION ADULT - LEVEL OF INDEPENDENCE: SIT/STAND, REHAB EVAL
ICU  Critical Care APRN Progress Note    NAME: Bernard Arellano - ROOM: [address] [place] [address] - MRN: [id number] - Age: 77year o minimum assist (75% patients effort)

## 2021-12-15 NOTE — PROGRESS NOTE ADULT - SUBJECTIVE AND OBJECTIVE BOX
Adal Paniagua MD  Internal Medicine, PGY-1  Pager: [phone]    SUBJECTIVE / OVERNIGHT EVENTS:  - Pt seen and examined at bedside  - Pt returning to [facility] 9N post op from [facility]    MEDICATIONS  (STANDING):  acetaminophen     Tablet .. 500 milliGRAM(s) Oral every 6 hours  dextrose 40% Gel 15 Gram(s) Oral once  dextrose 5%. 1000 milliLiter(s) (50 mL/Hr) IV Continuous <Continuous>  dextrose 5%. 1000 milliLiter(s) (100 mL/Hr) IV Continuous <Continuous>  dextrose 50% Injectable 25 Gram(s) IV Push once  dextrose 50% Injectable 12.5 Gram(s) IV Push once  dextrose 50% Injectable 25 Gram(s) IV Push once  glucagon  Injectable 1 milliGRAM(s) IntraMuscular once  insulin lispro (ADMELOG) corrective regimen sliding scale   SubCutaneous three times a day before meals  insulin lispro (ADMELOG) corrective regimen sliding scale   SubCutaneous at bedtime  nafcillin  IVPB 2 Gram(s) IV Intermittent every 4 hours  pantoprazole    Tablet 40 milliGRAM(s) Oral before breakfast  senna 2 Tablet(s) Oral at bedtime  sodium chloride 0.9%. 1000 milliLiter(s) (100 mL/Hr) IV Continuous <Continuous>    MEDICATIONS  (PRN):  aluminum hydroxide/magnesium hydroxide/simethicone Suspension 30 milliLiter(s) Oral every 12 hours PRN Indigestion  cyclobenzaprine 5 milliGRAM(s) Oral three times a day PRN Muscle Spasm  magnesium hydroxide Suspension 30 milliLiter(s) Oral every 12 hours PRN Constipation  traMADol 50 milliGRAM(s) Oral every 6 hours PRN Moderate Pain (4 - 6)  traMADol 100 milliGRAM(s) Oral every 6 hours PRN Severe Pain (7 - 10)      PHYSICAL EXAM:  Vital Signs Last 24 Hrs  T(C): 36.8 (15 Dec 2021 08:42), Max: 37.1 (15 Dec 2021 04:00)  T(F): 98.3 (15 Dec 2021 08:42), Max: 98.8 (15 Dec 2021 04:00)  HR: 73 (15 Dec 2021 11:15) (64 - 104)  BP: 120/84 (15 Dec 2021 08:42) (120/84 - 171/97)  BP(mean): 104 (15 Dec 2021 01:25) (104 - 129)  RR: 18 (15 Dec 2021 08:42) (14 - 18)  SpO2: 97% (15 Dec 2021 08:42) (93% - 100%)    CAPILLARY BLOOD GLUCOSE      POCT Blood Glucose.: 105 mg/dL (15 Dec 2021 12:06)  POCT Blood Glucose.: 83 mg/dL (15 Dec 2021 08:21)  POCT Blood Glucose.: 125 mg/dL (14 Dec 2021 23:47)    I&O's Summary    14 Dec 2021 07:01  -  15 Dec 2021 07:00  --------------------------------------------------------  IN: 700 mL / OUT: 1065 mL / NET: -365 mL    15 Dec 2021 07:01  -  15 Dec 2021 14:42  --------------------------------------------------------  IN: 100 mL / OUT: 0 mL / NET: 100 mL        CONSTITUTIONAL: NAD, well-developed  RESPIRATORY: Normal respiratory effort; lungs are clear to auscultation bilaterally  CARDIOVASCULAR: Regular rate and rhythm, normal S1 and S2, no murmur/rub/gallop; No lower extremity edema; Peripheral pulses are 2+ bilaterally  ABDOMEN: Nontender to palpation, normoactive bowel sounds, no rebound/guarding; No hepatosplenomegaly  MUSCLOSKELETAL: no clubbing or cyanosis of digits; no joint swelling or tenderness to palpation  PSYCH: A+O to person, place, and time; affect appropriate    LABS:                        10.7   9.60  )-----------( 310      ( 15 Dec 2021 08:12 )             32.2     12-15    137  |  103  |  8   ----------------------------<  81  3.7   |  23  |  0.76    Ca    8.0<L>      15 Dec 2021 08:12  Phos  2.9     12-14  Mg     2.10     12-14    TPro  6.2  /  Alb  2.4<L>  /  TBili  0.8  /  DBili  x   /  AST  24  /  ALT  51<H>  /  AlkPhos  101  12-14    PT/INR - ( 14 Dec 2021 07:16 )   PT: 13.3 sec;   INR: 1.16 ratio         PTT - ( 14 Dec 2021 07:16 )  PTT:30.1 sec          Culture - Blood (collected 12 Dec 2021 16:22)  Source: .Blood Blood-Venous  Preliminary Report (13 Dec 2021 17:02):    No growth to date.    Culture - Blood (collected 12 Dec 2021 16:22)  Source: .Blood Blood-Venous  Preliminary Report (13 Dec 2021 17:02):    No growth to date.        IMAGING:   Adal Paniagua MD  Internal Medicine, PGY-1  Pager: [phone]    SUBJECTIVE / OVERNIGHT EVENTS:  - Pt seen and examined at bedside  - Pt returning to [facility] 9N post op from [facility]; tolerated procedure well, no active complaints aside from 5/10 pain around the site of the surgery    MEDICATIONS  (STANDING):  acetaminophen     Tablet .. 500 milliGRAM(s) Oral every 6 hours  dextrose 40% Gel 15 Gram(s) Oral once  dextrose 5%. 1000 milliLiter(s) (50 mL/Hr) IV Continuous <Continuous>  dextrose 5%. 1000 milliLiter(s) (100 mL/Hr) IV Continuous <Continuous>  dextrose 50% Injectable 25 Gram(s) IV Push once  dextrose 50% Injectable 12.5 Gram(s) IV Push once  dextrose 50% Injectable 25 Gram(s) IV Push once  glucagon  Injectable 1 milliGRAM(s) IntraMuscular once  insulin lispro (ADMELOG) corrective regimen sliding scale   SubCutaneous three times a day before meals  insulin lispro (ADMELOG) corrective regimen sliding scale   SubCutaneous at bedtime  nafcillin  IVPB 2 Gram(s) IV Intermittent every 4 hours  pantoprazole    Tablet 40 milliGRAM(s) Oral before breakfast  senna 2 Tablet(s) Oral at bedtime  sodium chloride 0.9%. 1000 milliLiter(s) (100 mL/Hr) IV Continuous <Continuous>    MEDICATIONS  (PRN):  aluminum hydroxide/magnesium hydroxide/simethicone Suspension 30 milliLiter(s) Oral every 12 hours PRN Indigestion  cyclobenzaprine 5 milliGRAM(s) Oral three times a day PRN Muscle Spasm  magnesium hydroxide Suspension 30 milliLiter(s) Oral every 12 hours PRN Constipation  traMADol 50 milliGRAM(s) Oral every 6 hours PRN Moderate Pain (4 - 6)  traMADol 100 milliGRAM(s) Oral every 6 hours PRN Severe Pain (7 - 10)      PHYSICAL EXAM:  Vital Signs Last 24 Hrs  T(C): 36.8 (15 Dec 2021 08:42), Max: 37.1 (15 Dec 2021 04:00)  T(F): 98.3 (15 Dec 2021 08:42), Max: 98.8 (15 Dec 2021 04:00)  HR: 73 (15 Dec 2021 11:15) (64 - 104)  BP: 120/84 (15 Dec 2021 08:42) (120/84 - 171/97)  BP(mean): 104 (15 Dec 2021 01:25) (104 - 129)  RR: 18 (15 Dec 2021 08:42) (14 - 18)  SpO2: 97% (15 Dec 2021 08:42) (93% - 100%)    CAPILLARY BLOOD GLUCOSE      POCT Blood Glucose.: 105 mg/dL (15 Dec 2021 12:06)  POCT Blood Glucose.: 83 mg/dL (15 Dec 2021 08:21)  POCT Blood Glucose.: 125 mg/dL (14 Dec 2021 23:47)    I&O's Summary    14 Dec 2021 07:01  -  15 Dec 2021 07:00  --------------------------------------------------------  IN: 700 mL / OUT: 1065 mL / NET: -365 mL    15 Dec 2021 07:01  -  15 Dec 2021 14:42  --------------------------------------------------------  IN: 100 mL / OUT: 0 mL / NET: 100 mL        CONSTITUTIONAL: NAD, well-developed  RESPIRATORY: Normal respiratory effort; lungs are clear to auscultation bilaterally  CARDIOVASCULAR: Regular rate and rhythm, normal S1 and S2, no murmur/rub/gallop; No lower extremity edema; Peripheral pulses are 2+ bilaterally  ABDOMEN: Nontender to palpation, normoactive bowel sounds, no rebound/guarding; No hepatosplenomegaly  MUSCLOSKELETAL/NEURO: Pt has a cervical collar in place post op. Pt reports no active issues aside from moderate achiness around his neck. Reduced ROM of the neck consistent with his recent cervical spine surgery. Pt has no focal neurologic deficits, full ROM, strength, and sensation in upper and lower extremities b/l. Pt has a wound vac in place draining serosanguinous fluid from the site of his operation.   PSYCH: A+O to person, place, and time; affect appropriate    LABS:                        10.7   9.60  )-----------( 310      ( 15 Dec 2021 08:12 )             32.2     12-15    137  |  103  |  8   ----------------------------<  81  3.7   |  23  |  0.76    Ca    8.0<L>      15 Dec 2021 08:12  Phos  2.9     12-14  Mg     2.10     12-14    TPro  6.2  /  Alb  2.4<L>  /  TBili  0.8  /  DBili  x   /  AST  24  /  ALT  51<H>  /  AlkPhos  101  12-14    PT/INR - ( 14 Dec 2021 07:16 )   PT: 13.3 sec;   INR: 1.16 ratio         PTT - ( 14 Dec 2021 07:16 )  PTT:30.1 sec          Culture - Blood (collected 12 Dec 2021 16:22)  Source: .Blood Blood-Venous  Preliminary Report (13 Dec 2021 17:02):    No growth to date.    Culture - Blood (collected 12 Dec 2021 16:22)  Source: .Blood Blood-Venous  Preliminary Report (13 Dec 2021 17:02):    No growth to date.        IMAGING:

## 2021-12-15 NOTE — PROGRESS NOTE ADULT - PROBLEM SELECTOR PLAN 1
Patient presented with severe back pain (10/10 maximum intensity), with onset approximately one month ago, care delayed since patient was in [facility]. MRI w/ discitis/ osteomyelitis in C4/C5 with prevertebral/ phlegmon w/ concern for developing abscess extending from C2 to C6 levels and discitis/ osteomyelitis in L3/L4 level and an extensive paravertebral phlegmon across L3-4. High concern for MSSA osteomyelitis given MSSA bacteremia (BCx 12/10). Today he is POD1  s/p C3-6 lami/PSF for OM-discitis with phlegmon.   -Continue Nafcillin 2g Q4hrs  -Dilaudid 1 mg Q4H PRN severe pain  -Fall precautions Patient presented with severe back pain (10/10 maximum intensity), with onset approximately one month ago, care delayed since patient was in [facility]. MRI w/ discitis/ osteomyelitis in C4/C5 with prevertebral/ phlegmon w/ concern for developing abscess extending from C2 to C6 levels and discitis/ osteomyelitis in L3/L4 level and an extensive paravertebral phlegmon across L3-4. High concern for MSSA osteomyelitis given MSSA bacteremia (BCx 12/10). Today he is POD1  s/p C3-6 lami/PSF for OM-discitis with phlegmon.   -Continue Nafcillin   -Dilaudid 1 mg Q4H PRN severe pain  -Fall precautions

## 2021-12-15 NOTE — PROGRESS NOTE ADULT - ASSESSMENT
66y Male s/p C3-6 lami/PSF for OM-discitis with phlegmon. POD1 66y Male no known PMH who presented with worsening back pain w/ associated weakness found to have Staph aureus bacteremia in the setting of a L psoas abscess and cervical and lumbar osteomyelitis w/ developing phelgmon/abscess formation. He is now POD1 s/p C3-6 lami/PSF for OM-discitis with phlegmon.

## 2021-12-15 NOTE — PROGRESS NOTE ADULT - PROBLEM SELECTOR PLAN 3
MSSA Bacteremia present. Blood Cx (12/10) positive for Staph aureus x2 and Staph Epi x 1 (likely contaminant). Likely from osteomyelitis w/ abscess formation.    - Abx as above  - Repeat Cx ordered   - TTE w/o vegetation  - Will obtain GIGI once surgery completed and source control obtained.     #Asymptomatic Bacteriuria:   - UA + UCx w/ E coli, however given asymptomatic will not treat, as per ID. MSSA Bacteremia present. Blood Cx (12/10) positive for Staph aureus x2 and Staph Epi x 1 (likely contaminant). Likely from osteomyelitis w/ abscess formation.     - Abx as above  - Repeat Cx ordered   - TTE w/o vegetation  - Will obtain GIGI once surgery completed and source control obtained.     #Asymptomatic Bacteriuria:   - UA + UCx w/ E coli, however given asymptomatic will not treat, as per ID.

## 2021-12-15 NOTE — PHYSICAL THERAPY INITIAL EVALUATION ADULT - PERTINENT HX OF CURRENT PROBLEM, REHAB EVAL
65 yo M /o back pain for 1 month of progressively worsening back pain. Patient states that he woke up approximately 1 months ago with so severe that he was unable to walk. He feels that the inability to walk is due to pain rather than weakness. He endorses pain throughout his spine that also travels laterally across waist in the lower back. He reports paresthesias in both legs and perianal numbness since the pain began. He also began to experience fever.

## 2021-12-15 NOTE — PROGRESS NOTE ADULT - PROBLEM SELECTOR PLAN 6
Diet: Consistent Carb  DVT: Lovenox (held for surgery)   Dispo: POD1 from laminectomy Diet: Consistent Carb  DVT: Held maile will consider restarting tomorrow  Dispo: POD1 from laminectomy; medicine floors

## 2021-12-15 NOTE — DISCHARGE NOTE NURSING/CASE MANAGEMENT/SOCIAL WORK - PATIENT PORTAL LINK FT
You can access the FollowMyHealth Patient Portal offered by [facility] by registering at the following website: [email or web address]. By joining [employer]’s FollowMyHealth portal, you will also be able to view your health information using other applications (apps) compatible with our system.

## 2021-12-15 NOTE — DISCHARGE NOTE NURSING/CASE MANAGEMENT/SOCIAL WORK - NSDCPEFALRISK_GEN_ALL_CORE
For information on Fall & Injury Prevention, visit: [email or web address] OR  [email or web address] OR  https://www.cdc.gov/steadi/patient.html [facility] ([facility]):

## 2021-12-15 NOTE — CHART NOTE - NSCHARTNOTEFT_GEN_A_CORE
Fit and apply figure 8 clavicle orthosis. Orthosis fit well. Contact information given. To notify office with any issues questions or concerns.    Orthosis to be worn per MD instruction to stabilize spine.    Panfilo SEO  [facility]  [phone]
CAPRINI SCORE [CLOT]    AGE RELATED RISK FACTORS                                                       MOBILITY RELATED FACTORS  [ ] Age 41-60 years                                            (1 Point)                  [ ] Bed rest                                                        (1 Point)  [x] Age: 61-74 years                                           (2 Points)                 [ ] Plaster cast                                                   (2 Points)  [ ] Age= 75 years                                              (3 Points)                 [ ] Bed bound for more than 72 hours                 (2 Points)    DISEASE RELATED RISK FACTORS                                               GENDER SPECIFIC FACTORS  [ ] Edema in the lower extremities                       (1 Point)                  [ ] Pregnancy                                                     (1 Point)  [ ] Varicose veins                                               (1 Point)                  [ ] Post-partum < 6 weeks                                   (1 Point)             [ ] BMI > 25 Kg/m2                                            (1 Point)                  [ ] Hormonal therapy  or oral contraception          (1 Point)                 [ ] Sepsis (in the previous month)                        (1 Point)                  [ ] History of pregnancy complications                 (1 point)  [ ] Pneumonia or serious lung disease                                               [ ] Unexplained or recurrent                     (1 Point)           (in the previous month)                               (1 Point)  [ ] Abnormal pulmonary function test                     (1 Point)                 SURGERY RELATED RISK FACTORS  [ ] Acute myocardial infarction                              (1 Point)                 [ ]  Section                                             (1 Point)  [ ] Congestive heart failure (in the previous month)  (1 Point)               [ ] Minor surgery                                                  (1 Point)   [ ] Inflammatory bowel disease                             (1 Point)                 [ ] Arthroscopic surgery                                        (2 Points)  [ ] Central venous access                                      (2 Points)               [x] General surgery lasting more than 45 minutes   (2 Points)       [ ] Stroke (in the previous month)                          (5 Points)               [ ] Elective arthroplasty                                         (5 Points)                                                                                                                                               HEMATOLOGY RELATED FACTORS                                                 TRAUMA RELATED RISK FACTORS  [ ] Prior episodes of VTE                                     (3 Points)                [ ] Fracture of the hip, pelvis, or leg                       (5 Points)  [ ] Positive family history for VTE                         (3 Points)                 [ ] Acute spinal cord injury (in the previous month)  (5 Points)  [ ] Prothrombin 38302 A                                     (3 Points)                 [ ] Paralysis  (less than 1 month)                             (5 Points)  [ ] Factor V Leiden                                             (3 Points)                  [ ] Multiple Trauma within 1 month                        (5 Points)  [ ] Lupus anticoagulants                                     (3 Points)                                                           [ ] Anticardiolipin antibodies                               (3 Points)                                                       [ ] High homocysteine in the blood                      (3 Points)                                             [ ] Other congenital or acquired thrombophilia      (3 Points)                                                [ ] Heparin induced thrombocytopenia                  (3 Points)                                          Total Score [    4      ]    Caprini Score 0 - 2:  Low Risk, No VTE Prophylaxis required for most patients, encourage ambulation  Caprini Score 3 - 6:  At Risk, pharmacologic VTE prophylaxis is indicated for most patients (in the absence of a contraindication)  Caprini Score Greater than or = 7:  High Risk, pharmacologic VTE prophylaxis is indicated for most patients (in the absence of a contraindication)

## 2021-12-15 NOTE — DISCHARGE NOTE PROVIDER - NSDCFUADDAPPT_GEN_ALL_CORE_FT
Please follow up with Dr. Lara in approximately 2 weeks from surgery date.  Please call office to schedule your appointment.    Please follow up with your primary care provider once discharged from [facility] for continuum of care and medication adjustment as needed.

## 2021-12-15 NOTE — DISCHARGE NOTE PROVIDER - NSDCFUADDINST_GEN_ALL_CORE_FT
Please keep dressing clean and dry.  Dressing and incision care to be performed on follow up visit with Dr. Lara.    Ambulate as tolerate, must wear figure of 8 brace at all times.    Hemovac output to be removed when Dr. Lara okays the removal of drain.  Monitor drain output shift/daily.

## 2021-12-15 NOTE — DISCHARGE NOTE PROVIDER - HOSPITAL COURSE
This is a 65 y/o M with PMhx HTN, DMII, C4-C5 Osteomyelitis/Discitis and L3-L4 Osteomyelitis/Discitis transferred to [facility] on 12/14/21 from [facility] for a C3-C6 laminectomy/posterior with Dr. Lara.  Patient tolerated procedure well with no complications.  Physical therapy evaluated patient and recommended patient for acute rehab.  Patient medically cleared and stable for transfer back to [facility] to primary team (medicine).

## 2021-12-15 NOTE — DISCHARGE NOTE PROVIDER - NSDCCPCAREPLAN_GEN_ALL_CORE_FT
PRINCIPAL DISCHARGE DIAGNOSIS  Diagnosis: Cervical discitis  Assessment and Plan of Treatment:       SECONDARY DISCHARGE DIAGNOSES  Diagnosis: Lumbar discitis  Assessment and Plan of Treatment:      PRINCIPAL DISCHARGE DIAGNOSIS  Diagnosis: Stenosis of cervical spine with myelopathy  Assessment and Plan of Treatment:

## 2021-12-15 NOTE — DISCHARGE NOTE PROVIDER - CARE PROVIDER_API CALL
Marshall Lara (MD)  [facility]  [address], [address]  [place] [address]  Phone: [phone]  Fax: [phone]  Follow Up Time:

## 2021-12-15 NOTE — PROGRESS NOTE ADULT - SUBJECTIVE AND OBJECTIVE BOX
Orthopaedic Surgery Progress Note    Subjective:   Patient seen and examined. No acute events overnight. Pain well controlled on current regimen.     Objective:  T(C): 37 (12-15-21 @ 02:00), Max: 37.4 (12-14-21 @ 11:54)  HR: 82 (12-15-21 @ 02:00) (67 - 104)  BP: 136/86 (12-15-21 @ 02:00) (125/68 - 171/97)  RR: 18 (12-15-21 @ 02:00) (14 - 18)  SpO2: 96% (12-15-21 @ 02:00) (96% - 100%)  Wt(kg): --    12-14 @ 07:01  -  12-15 @ 02:49  --------------------------------------------------------  IN: 100 mL / OUT: 425 mL / NET: -325 mL        PE    NAD, somnolent  Neck:   dressing C/D/I, c-spine collar in place (soft collar exchanged to aspen)  ROMARIO SS  Neuro:  RUE Delt 4/5 Bi 5/5 Tri 5/5 Wrist ext 5/5  5/5  SILT C5-T1  LUE Delt 4/5 Bi 5/5 Tri 5/5 Wrist ext 5/5  4/5  SILT C5-T1  WWP BUE                            9.9    9.75  )-----------( 281      ( 15 Dec 2021 00:51 )             29.7     12-15    137  |  104  |  8   ----------------------------<  147<H>  3.6   |  20<L>  |  0.72    Ca    8.5      15 Dec 2021 00:51  Phos  2.9     12-14  Mg     2.10     12-14    TPro  6.2  /  Alb  2.4<L>  /  TBili  0.8  /  DBili  x   /  AST  24  /  ALT  51<H>  /  AlkPhos  101  12-14    PT/INR - ( 14 Dec 2021 07:16 )   PT: 13.3 sec;   INR: 1.16 ratio         PTT - ( 14 Dec 2021 07:16 )  PTT:30.1 sec      66y Male s/p C3-6 lami/PSF for OM-discitis with phlegmon  - Pain control  - Nafcillin  - FU labs  - monitor HV output  - c-spine collar, f/u figure of 8 brace  - WBAT  - PT/OT/OOB  - I/S  - SCDs  - Dispo planning: possible transfer back to [facility] Orthopaedic Surgery Progress Note    Subjective:   Patient seen and examined. No acute events overnight. Pain well controlled on current regimen.     Objective:  T(C): 37 (12-15-21 @ 02:00), Max: 37.4 (12-14-21 @ 11:54)  HR: 82 (12-15-21 @ 02:00) (67 - 104)  BP: 136/86 (12-15-21 @ 02:00) (125/68 - 171/97)  RR: 18 (12-15-21 @ 02:00) (14 - 18)  SpO2: 96% (12-15-21 @ 02:00) (96% - 100%)  Wt(kg): --    12-14 @ 07:01  -  12-15 @ 02:49  --------------------------------------------------------  IN: 100 mL / OUT: 425 mL / NET: -325 mL        PE    NAD, somnolent  Neck:   dressing C/D/I, c-spine collar in place (soft collar exchanged to aspen)  ROMARIO SS  Neuro:  RUE Delt 4/5 Bi 5/5 Tri 5/5 Wrist ext 5/5  5/5  SILT C5-T1  LUE Delt 4/5 Bi 5/5 Tri 5/5 Wrist ext 5/5  5/5  SILT C5-T1  WWP BUE                            9.9    9.75  )-----------( 281      ( 15 Dec 2021 00:51 )             29.7     12-15    137  |  104  |  8   ----------------------------<  147<H>  3.6   |  20<L>  |  0.72    Ca    8.5      15 Dec 2021 00:51  Phos  2.9     12-14  Mg     2.10     12-14    TPro  6.2  /  Alb  2.4<L>  /  TBili  0.8  /  DBili  x   /  AST  24  /  ALT  51<H>  /  AlkPhos  101  12-14    PT/INR - ( 14 Dec 2021 07:16 )   PT: 13.3 sec;   INR: 1.16 ratio         PTT - ( 14 Dec 2021 07:16 )  PTT:30.1 sec      66y Male s/p C3-6 lami/PSF for OM-discitis with phlegmon  - Pain control  - Nafcillin  - FU labs  - monitor HV output  - c-spine collar, f/u figure of 8 brace  - WBAT  - PT/OT/OOB  - I/S  - SCDs  - Dispo planning: possible transfer back to [facility]

## 2021-12-15 NOTE — PHYSICAL THERAPY INITIAL EVALUATION ADULT - PRECAUTIONS/LIMITATIONS, REHAB EVAL
CONT: [place] the [place] from [facility] a few days ago to seek medical care. Denies HS/LOC. Denies pain/injury elsewhere. Denies bowel/bladder incontinence. Denies IV drug use. Of note, the pt was seen in the ER at [facility] on 12/10 and had blood cultures done, which were positive for staph aureus./fall precautions/spinal precautions/surgical precautions

## 2021-12-15 NOTE — DISCHARGE NOTE PROVIDER - NSDCMRMEDTOKEN_GEN_ALL_CORE_FT
acetaminophen 500 mg oral tablet: 1 tab(s) orally every 6 hours  aluminum hydroxide-magnesium hydroxide 200 mg-200 mg/5 mL oral suspension: 30 milliliter(s) orally every 12 hours, As needed, Indigestion  cyclobenzaprine 5 mg oral tablet: 1 tab(s) orally 3 times a day, As needed, Muscle Spasm  magnesium hydroxide 8% oral suspension: 30 milliliter(s) orally every 12 hours, As needed, Constipation  nafcillin: 2 gram(s) intravenous every 4 hours  pantoprazole 40 mg oral delayed release tablet: 1 tab(s) orally once a day (before a meal)  senna oral tablet: 2 tab(s) orally once a day (at bedtime)  sodium chloride 0.9% injectable solution: 1000 milliliter(s) intravenously every 10 hours  traMADol 50 mg oral tablet: 2 tab(s) orally every 6 hours, As needed, Severe Pain (7 - 10)  traMADol 50 mg oral tablet: 1 tab(s) orally every 6 hours, As needed, Moderate Pain (4 - 6)

## 2021-12-15 NOTE — PROGRESS NOTE ADULT - ATTENDING COMMENTS
Agree with above. Neurologic exam stable. Will monitor drain output. Ok for transfer back to [facility] when able.
Pt seen and examined by me Agree with resident   In brief, this is 67 yo M with pmh of DM and Hypertension, presented with back pain.     # Sepsis- Pt met sepsis criteria on admission with HR of 107 and WBC > 12 . Likely secondary to Spine OM/Discisits due to Staph aureus   s/p s/p C3-6 lami/PSF.   MRI with findings of " Discitis/osteomyelitis of C4/C5 with prevertebral edema/phelgmon extending from C2/C3 through C5/C6 resulting in flattening of the cord without cord edema.  Additional area of discitis/osteomyelitis at L3/L4.  Bilateral psoas abscesses.  Blood culture  12/10 with finding of Staph aureus and Staph epidermidis, while UC indicates E-coli. Repeat BC NGTD   IR eval-psoas abscess too small for drainage at this time. No indication for aspiration/sampling as already have positive blood cultures to guide therapy. If patient does not improve on IV antibiotics, can consider interval re imaging in 1 week to assess for development of abscess, reconsult prn  On Nafcillin 2 gm Q 4 hours. Continue wound care/Pain contol/Neck collar    ID/Ortho on board

## 2021-12-15 NOTE — PHYSICAL THERAPY INITIAL EVALUATION ADULT - GAIT DEVIATIONS NOTED, PT EVAL
decreased adelita/increased time in double stance/decreased velocity of limb motion/decreased weight-shifting ability

## 2021-12-15 NOTE — PATIENT PROFILE ADULT - FALL HARM RISK - HARM RISK INTERVENTIONS

## 2021-12-15 NOTE — PHYSICAL THERAPY INITIAL EVALUATION ADULT - ADDITIONAL COMMENTS
Pt will be staying with sister, 1 step to enter +HR. Prior to admission, pt was I with all functional mobility and ADLs without AD. Retired.

## 2021-12-16 DIAGNOSIS — M46.26 OSTEOMYELITIS OF VERTEBRA, LUMBAR REGION: ICD-10-CM

## 2021-12-16 DIAGNOSIS — R73.03 PREDIABETES: ICD-10-CM

## 2021-12-16 DIAGNOSIS — I10 ESSENTIAL (PRIMARY) HYPERTENSION: ICD-10-CM

## 2021-12-16 DIAGNOSIS — R78.81 BACTEREMIA: ICD-10-CM

## 2021-12-16 DIAGNOSIS — Z79.2 LONG TERM (CURRENT) USE OF ANTIBIOTICS: ICD-10-CM

## 2021-12-16 DIAGNOSIS — Z29.9 ENCOUNTER FOR PROPHYLACTIC MEASURES, UNSPECIFIED: ICD-10-CM

## 2021-12-16 DIAGNOSIS — M46.22 OSTEOMYELITIS OF VERTEBRA, CERVICAL REGION: ICD-10-CM

## 2021-12-16 DIAGNOSIS — M86.10 OTHER ACUTE OSTEOMYELITIS, UNSPECIFIED SITE: ICD-10-CM

## 2021-12-16 DIAGNOSIS — K68.12 PSOAS MUSCLE ABSCESS: ICD-10-CM

## 2021-12-16 LAB
ALBUMIN SERPL ELPH-MCNC: 2.5 G/DL — LOW (ref 3.3–5)
ALP SERPL-CCNC: 83 U/L — SIGNIFICANT CHANGE UP (ref 40–120)
ALT FLD-CCNC: 33 U/L — SIGNIFICANT CHANGE UP (ref 4–41)
ANION GAP SERPL CALC-SCNC: 10 MMOL/L — SIGNIFICANT CHANGE UP (ref 7–14)
AST SERPL-CCNC: 23 U/L — SIGNIFICANT CHANGE UP (ref 4–40)
BILIRUB SERPL-MCNC: 0.4 MG/DL — SIGNIFICANT CHANGE UP (ref 0.2–1.2)
BUN SERPL-MCNC: 3 MG/DL — LOW (ref 7–23)
CALCIUM SERPL-MCNC: 7.8 MG/DL — LOW (ref 8.4–10.5)
CHLORIDE SERPL-SCNC: 101 MMOL/L — SIGNIFICANT CHANGE UP (ref 98–107)
CO2 SERPL-SCNC: 24 MMOL/L — SIGNIFICANT CHANGE UP (ref 22–31)
CREAT SERPL-MCNC: 0.71 MG/DL — SIGNIFICANT CHANGE UP (ref 0.5–1.3)
GLUCOSE SERPL-MCNC: 99 MG/DL — SIGNIFICANT CHANGE UP (ref 70–99)
HCT VFR BLD CALC: 30.1 % — LOW (ref 39–50)
HGB BLD-MCNC: 10.1 G/DL — LOW (ref 13–17)
MAGNESIUM SERPL-MCNC: 1.6 MG/DL — SIGNIFICANT CHANGE UP (ref 1.6–2.6)
MCHC RBC-ENTMCNC: 30.1 PG — SIGNIFICANT CHANGE UP (ref 27–34)
MCHC RBC-ENTMCNC: 33.6 GM/DL — SIGNIFICANT CHANGE UP (ref 32–36)
MCV RBC AUTO: 89.6 FL — SIGNIFICANT CHANGE UP (ref 80–100)
NRBC # BLD: 0 /100 WBCS — SIGNIFICANT CHANGE UP
NRBC # FLD: 0 K/UL — SIGNIFICANT CHANGE UP
PHOSPHATE SERPL-MCNC: 2.3 MG/DL — LOW (ref 2.5–4.5)
PLATELET # BLD AUTO: 307 K/UL — SIGNIFICANT CHANGE UP (ref 150–400)
POTASSIUM SERPL-MCNC: 3.2 MMOL/L — LOW (ref 3.5–5.3)
POTASSIUM SERPL-SCNC: 3.2 MMOL/L — LOW (ref 3.5–5.3)
PROT SERPL-MCNC: 5.7 G/DL — LOW (ref 6–8.3)
RBC # BLD: 3.36 M/UL — LOW (ref 4.2–5.8)
RBC # FLD: 15.2 % — HIGH (ref 10.3–14.5)
SODIUM SERPL-SCNC: 135 MMOL/L — SIGNIFICANT CHANGE UP (ref 135–145)
WBC # BLD: 8.36 K/UL — SIGNIFICANT CHANGE UP (ref 3.8–10.5)
WBC # FLD AUTO: 8.36 K/UL — SIGNIFICANT CHANGE UP (ref 3.8–10.5)

## 2021-12-16 PROCEDURE — 99255 IP/OBS CONSLTJ NEW/EST HI 80: CPT

## 2021-12-16 PROCEDURE — 99233 SBSQ HOSP IP/OBS HIGH 50: CPT | Mod: GC

## 2021-12-16 RX ORDER — MAGNESIUM SULFATE 500 MG/ML
2 VIAL (ML) INJECTION ONCE
Refills: 0 | Status: COMPLETED | OUTPATIENT
Start: 2021-12-16 | End: 2021-12-16

## 2021-12-16 RX ORDER — SODIUM,POTASSIUM PHOSPHATES 278-250MG
1 POWDER IN PACKET (EA) ORAL ONCE
Refills: 0 | Status: COMPLETED | OUTPATIENT
Start: 2021-12-16 | End: 2021-12-16

## 2021-12-16 RX ORDER — POTASSIUM CHLORIDE 20 MEQ
40 PACKET (EA) ORAL EVERY 4 HOURS
Refills: 0 | Status: COMPLETED | OUTPATIENT
Start: 2021-12-16 | End: 2021-12-16

## 2021-12-16 RX ADMIN — Medication 40 MILLIEQUIVALENT(S): at 13:48

## 2021-12-16 RX ADMIN — PANTOPRAZOLE SODIUM 40 MILLIGRAM(S): 20 TABLET, DELAYED RELEASE ORAL at 06:01

## 2021-12-16 RX ADMIN — Medication 40 MILLIEQUIVALENT(S): at 10:11

## 2021-12-16 RX ADMIN — NAFCILLIN 200 GRAM(S): 10 INJECTION, POWDER, FOR SOLUTION INTRAVENOUS at 18:01

## 2021-12-16 RX ADMIN — TRAMADOL HYDROCHLORIDE 50 MILLIGRAM(S): 50 TABLET ORAL at 00:38

## 2021-12-16 RX ADMIN — Medication 25 GRAM(S): at 10:49

## 2021-12-16 RX ADMIN — SODIUM CHLORIDE 75 MILLILITER(S): 9 INJECTION, SOLUTION INTRAVENOUS at 07:34

## 2021-12-16 RX ADMIN — TRAMADOL HYDROCHLORIDE 50 MILLIGRAM(S): 50 TABLET ORAL at 13:47

## 2021-12-16 RX ADMIN — NAFCILLIN 200 GRAM(S): 10 INJECTION, POWDER, FOR SOLUTION INTRAVENOUS at 14:05

## 2021-12-16 RX ADMIN — NAFCILLIN 200 GRAM(S): 10 INJECTION, POWDER, FOR SOLUTION INTRAVENOUS at 21:19

## 2021-12-16 RX ADMIN — TRAMADOL HYDROCHLORIDE 50 MILLIGRAM(S): 50 TABLET ORAL at 14:25

## 2021-12-16 RX ADMIN — TRAMADOL HYDROCHLORIDE 50 MILLIGRAM(S): 50 TABLET ORAL at 21:25

## 2021-12-16 RX ADMIN — TRAMADOL HYDROCHLORIDE 50 MILLIGRAM(S): 50 TABLET ORAL at 20:27

## 2021-12-16 RX ADMIN — NAFCILLIN 200 GRAM(S): 10 INJECTION, POWDER, FOR SOLUTION INTRAVENOUS at 10:01

## 2021-12-16 RX ADMIN — Medication 1 PACKET(S): at 13:47

## 2021-12-16 RX ADMIN — NAFCILLIN 200 GRAM(S): 10 INJECTION, POWDER, FOR SOLUTION INTRAVENOUS at 02:49

## 2021-12-16 RX ADMIN — NAFCILLIN 200 GRAM(S): 10 INJECTION, POWDER, FOR SOLUTION INTRAVENOUS at 06:01

## 2021-12-16 NOTE — H&P ADULT - PROBLEM SELECTOR PLAN 1
MRI w/ discitis/ osteomyelitis in C4/C5 with prevertebral/ phlegmon w/ concern for developing abscess extending from C2 to C6 levels and discitis/ osteomyelitis in L3/L4 level and an extensive paravertebral phlegmon across L3-4.  Now s/p C3-C6 laminectomy w/ PSF by ortho spine.   -Continue Nafcillin 2g Q4hrs - patient will need this for 6 weeks per ID from 12/14  - Per ID will need PICC line, as well as weekly ESR/CRP to trend osteomyelitis   - Repeat BCx (12/12) NGTD  - No plans per ortho for lumbar surgery, medical management at this time.   - Ortho to manage drain  - PT eval pending  - Activity as tolerated.

## 2021-12-16 NOTE — H&P ADULT - NSHPREVIEWOFSYSTEMS_GEN_ALL_CORE
REVIEW OF SYSTEMS:    CONSTITUTIONAL: No weakness, fevers or chills  EYES: No vision changes  ENT: No vertigo or throat pain   NECK: No pain or stiffness  RESPIRATORY: No cough, wheezing, hemoptysis; No shortness of breath  CARDIOVASCULAR: No chest pain or palpitations  GASTROINTESTINAL: No abdominal or epigastric pain. No nausea, vomiting, or hematemesis; No diarrhea or constipation. No melena or hematochezia.  GENITOURINARY: No dysuria, frequency or hematuria  NEUROLOGICAL: Positive for weakness  PSYCH: No anxiety, depression, or behavior changes  All other review of systems is negative unless indicated above.

## 2021-12-16 NOTE — PHYSICAL THERAPY INITIAL EVALUATION ADULT - MANUAL MUSCLE TESTING RESULTS, REHAB EVAL
spinal and surgical precautions; bilateral UE at least 3+/5, bilateral LE 3/5 (slight extension lag with bilateral straight leg raises)/grossly assessed due to

## 2021-12-16 NOTE — PROGRESS NOTE ADULT - PROBLEM SELECTOR PLAN 2
Presented with severe pain of the back, inability to ambulate due to pain  CT abd pelvis showed L psoas abscess.  MRI showed psoas abscess bilaterally.  Bacteremia with Staph aureus and Staph epidermidis (though staph epidermidis is likely contaminant).    -IR consulted for possible psoas abscess drainage - evaluated and found abscess to be too small to drain.   -Abx as above  -F/u BCx as above CT abd pelvis w/ L psoas abscess.  MRI showed psoas abscess bilaterally.  Bacteremia with Staph aureus    -IR consulted for possible psoas abscess drainage - evaluated and found abscess to be too small to drain.   -Abx as above  -F/u BCx as above.

## 2021-12-16 NOTE — H&P ADULT - PROBLEM SELECTOR PLAN 3
Blood Cx (12/10) positive for Staph aureus x2 and Staph Epi x 1 (likely contaminant), howevere repeat Cx (12/12) NGTD. Likely from osteomyelitis w/ abscess formation.  - Abx as above  - TTE w/o vegetation  - Given clearance of BCx, spoke with ID, no need for a GIGI at this time    #Asymptomatic Bacteriuria: - present on admission   - UA + UCx w/ E coli, however given asymptomatic will not treat, as per ID.

## 2021-12-16 NOTE — PHYSICAL THERAPY INITIAL EVALUATION ADULT - GENERAL OBSERVATIONS, REHAB EVAL
Pt encountered in semisupine position, no distress, AxOx4, with +IV, +hemovac, +bowman, and C/S hard collar.

## 2021-12-16 NOTE — PROGRESS NOTE ADULT - SUBJECTIVE AND OBJECTIVE BOX
Orthopaedic Surgery Progress Note    Subjective:   Patient seen and examined this morning. No acute events overnight. Pain well controlled. Tolerating diet. Denies N/V/D/F/C.     Objective:  Vital Signs Last 24 Hrs  T(C): 37.2 (16 Dec 2021 05:59), Max: 37.3 (15 Dec 2021 20:51)  T(F): 99 (16 Dec 2021 05:59), Max: 99.1 (15 Dec 2021 20:51)  HR: 65 (16 Dec 2021 05:59) (64 - 73)  BP: 143/82 (16 Dec 2021 05:59) (120/84 - 153/88)  BP(mean): --  RR: 16 (16 Dec 2021 05:59) (16 - 18)  SpO2: 100% (16 Dec 2021 05:59) (97% - 100%)      PE    NAD, somnolent  Neck:   dressing C/D/I, C collar in place  HV w/ SS output  Neuro:  RUE Delt 4/5 Bi 5/5 Tri 5/5 Wrist ext 5/5  5/5  SILT C5-T1  LUE Delt 4/5 Bi 5/5 Tri 5/5 Wrist ext 5/5  5/5  SILT C5-T1  WWP BUE                                                  10.7   9.60  )-----------( 310      ( 15 Dec 2021 08:12 )             32.2     12-15    137  |  103  |  8   ----------------------------<  81  3.7   |  23  |  0.76    Ca    8.0<L>      15 Dec 2021 08:12  Phos  2.9     12-14  Mg     2.10     12-14    TPro  6.2  /  Alb  2.4<L>  /  TBili  0.8  /  DBili  x   /  AST  24  /  ALT  51<H>  /  AlkPhos  101  12-14        A/P: 66y Male POD2 s/p C3-6 lami/PSF   - Pain control  - Abx: Nafcillin  - FU labs  - monitor HV output  - c-spine collar  - WBAT  - PT/OT/OOB  - I/S  - SCDs  - Dispo planningJ

## 2021-12-16 NOTE — H&P ADULT - NSHPLABSRESULTS_GEN_ALL_CORE
10.1   8.36  )-----------( 307      ( 16 Dec 2021 08:12 )             30.1     12-16    135  |  101  |  3<L>  ----------------------------<  99  3.2<L>   |  24  |  0.71    Ca    7.8<L>      16 Dec 2021 08:12  Phos  2.3     12-16  Mg     1.60     12-16    TPro  5.7<L>  /  Alb  2.5<L>  /  TBili  0.4  /  DBili  x   /  AST  23  /  ALT  33  /  AlkPhos  83  12-16        ABG - ( 14 Dec 2021 21:59 )  pH, Arterial: 7.40  pH, Blood: x     /  pCO2: 33    /  pO2: 490   / HCO3: 20    / Base Excess: -3.7  /  SaO2: 100.0             LIVER FUNCTIONS - ( 16 Dec 2021 08:12 )  Alb: 2.5 g/dL / Pro: 5.7 g/dL / ALK PHOS: 83 U/L / ALT: 33 U/L / AST: 23 U/L / GGT: x

## 2021-12-16 NOTE — H&P ADULT - NSHPSOCIALHISTORY_GEN_ALL_CORE
Lives in [facility], came to [place]. Social smoker and drinker. Denies any recreational drug use.

## 2021-12-16 NOTE — H&P ADULT - NSHPPHYSICALEXAM_GEN_ALL_CORE
VITALS:   T(C): 37.2 (12-16-21 @ 05:59), Max: 37.3 (12-15-21 @ 20:51)  HR: 65 (12-16-21 @ 05:59) (65 - 65)  BP: 143/82 (12-16-21 @ 05:59) (143/82 - 153/88)  RR: 16 (12-16-21 @ 05:59) (16 - 18)  SpO2: 100% (12-16-21 @ 05:59) (100% - 100%)    CONSTITUTIONAL: NAD, well-developed, C collar in place.   HEENT: hemovac in place draining serosanginous fluid from posterior of neck.   RESPIRATORY: Normal respiratory effort; lungs are clear to auscultation bilaterally  CARDIOVASCULAR: Regular rate and rhythm, normal S1 and S2, no murmur/rub/gallop; No lower extremity edema; Peripheral pulses are 2+ bilaterally  ABDOMEN: Nontender to palpation, normoactive bowel sounds, no rebound/guarding;   MUSCLOSKELETAL: no clubbing or cyanosis of digits; no joint swelling or tenderness to palpation  PSYCH: A+O to person, place, and time; affect appropriate  SKIN: No rashes or lesions

## 2021-12-16 NOTE — PROGRESS NOTE ADULT - PROBLEM SELECTOR PLAN 5
Patient reports he was diagnosed w/ essential HTN, however takes no meds for this. BP while in patient are WNL  - Will trend BPs and control BP accordingly. Patient reports he has been told he has diabetes but is not on any medications for it. A1c in patient 6.3.   - No indication for SSI.  - Patient is NOT a diabetic, has never been formally diagnosed with diabetes just self-reported, however will need close outpatient follow up to prevent progression to DM2.

## 2021-12-16 NOTE — PROGRESS NOTE ADULT - SUBJECTIVE AND OBJECTIVE BOX
Hesham Jerez MD  Internal Medicine, PGY-1  Pager: [phone] / [facility]: [phone]    SUBJECTIVE / OVERNIGHT EVENTS:  - Pt seen and examined at bedside  - GENTRY    MEDICATIONS  (STANDING):  dextrose 5% + lactated ringers. 1000 milliLiter(s) (75 mL/Hr) IV Continuous <Continuous>  nafcillin  IVPB 2 Gram(s) IV Intermittent every 4 hours  nafcillin  IVPB      pantoprazole    Tablet 40 milliGRAM(s) Oral before breakfast  senna 2 Tablet(s) Oral at bedtime    MEDICATIONS  (PRN):  acetaminophen     Tablet .. 650 milliGRAM(s) Oral every 6 hours PRN Temp greater or equal to 38C (100.4F), Mild Pain (1 - 3)  cyclobenzaprine 5 milliGRAM(s) Oral every 8 hours PRN Muscle Spasm  traMADol 50 milliGRAM(s) Oral every 6 hours PRN Moderate Pain (4 - 6)  traMADol 100 milliGRAM(s) Oral every 6 hours PRN Severe Pain (7 - 10)      PHYSICAL EXAM:  Vital Signs Last 24 Hrs  T(C): 37.2 (16 Dec 2021 05:59), Max: 37.3 (15 Dec 2021 20:51)  T(F): 99 (16 Dec 2021 05:59), Max: 99.1 (15 Dec 2021 20:51)  HR: 65 (16 Dec 2021 05:59) (64 - 73)  BP: 143/82 (16 Dec 2021 05:59) (120/84 - 153/88)  BP(mean): --  RR: 16 (16 Dec 2021 05:59) (16 - 18)  SpO2: 100% (16 Dec 2021 05:59) (97% - 100%)    CAPILLARY BLOOD GLUCOSE      POCT Blood Glucose.: 110 mg/dL (15 Dec 2021 20:37)  POCT Blood Glucose.: 105 mg/dL (15 Dec 2021 12:06)  POCT Blood Glucose.: 83 mg/dL (15 Dec 2021 08:21)    I&O's Summary    15 Dec 2021 07:01  -  16 Dec 2021 07:00  --------------------------------------------------------  IN: 200 mL / OUT: 2280 mL / NET: -2080 mL        CONSTITUTIONAL: NAD, well-developed  RESPIRATORY: Normal respiratory effort; lungs are clear to auscultation bilaterally  CARDIOVASCULAR: Regular rate and rhythm, normal S1 and S2, no murmur/rub/gallop; No lower extremity edema; Peripheral pulses are 2+ bilaterally  ABDOMEN: Nontender to palpation, normoactive bowel sounds, no rebound/guarding; No hepatosplenomegaly  MUSCLOSKELETAL: no clubbing or cyanosis of digits; no joint swelling or tenderness to palpation  PSYCH: A+O to person, place, and time; affect appropriate    LABS:                        10.7   9.60  )-----------( 310      ( 15 Dec 2021 08:12 )             32.2     12-15    137  |  103  |  8   ----------------------------<  81  3.7   |  23  |  0.76    Ca    8.0<L>      15 Dec 2021 08:12  Phos  2.9     12-14  Mg     2.10     12-14    TPro  6.2  /  Alb  2.4<L>  /  TBili  0.8  /  DBili  x   /  AST  24  /  ALT  51<H>  /  AlkPhos  101  12-14    PT/INR - ( 14 Dec 2021 07:16 )   PT: 13.3 sec;   INR: 1.16 ratio         PTT - ( 14 Dec 2021 07:16 )  PTT:30.1 sec            IMAGING:    CT  C Spine:    IMPRESSION: Disc space narrowing endplates chronic changes and erosions   of the endplates are seen at the C4-5 level which is secondary to   patient's known discitis/osteomyelitis.    Degenerative changes as described above.      MR Spine:    CERVICAL SPINE:  1.  Discitis/osteomyelitis at C4-C5, with adjacent thin (4 mm) right   epidural fluid collection resulting in mild to moderate spinal canal   stenosis, as detailed above.  2.  Large prevertebral phlegmon and/or early developing abscess extending   from C2 to C6.  3.  Cervical spondylosis, as detailed above.    THORACIC SPINE:  1.  No suggestion of discitis/osteomyelitis, epidural fluid collection,   within spinal canal stenosis or disc herniation within the thoracic spine.  2.  Small bilateral pleural effusions, right greater than left.    LUMBAR SPINE:  1.  Discitis/osteomyelitis at L3-L4, with adjacent trace epidural   phlegmon without discrete appreciable epidural fluid collection.  2.  Bilateral psoas muscle abscesses and extensive surrounding   paravertebral phlegmon adjacent to the setting of osteomyelitis at L3-L4.  3.  Lumbar spondylosis, most significant for severe spinal canal stenosis   at L4-L5 and multilevel moderate to severe neuroforaminal narrowing, as   detailed above. Hesham Jerez MD  Internal Medicine, PGY-1  Pager: [phone] / [facility]: [phone]    SUBJECTIVE / OVERNIGHT EVENTS:  - Pt seen and examined at bedside  - GENTRY  - Patient reports that he still has pain on his back but it has been otherwise mild. He is complaining of pain at the site of his L psoas abscess, however he reports that this has not increased from admission. He denies any fevers ,chills, nausea, vomiting, shortness of breath.     MEDICATIONS  (STANDING):  dextrose 5% + lactated ringers. 1000 milliLiter(s) (75 mL/Hr) IV Continuous <Continuous>  nafcillin  IVPB 2 Gram(s) IV Intermittent every 4 hours  nafcillin  IVPB      pantoprazole    Tablet 40 milliGRAM(s) Oral before breakfast  senna 2 Tablet(s) Oral at bedtime    MEDICATIONS  (PRN):  acetaminophen     Tablet .. 650 milliGRAM(s) Oral every 6 hours PRN Temp greater or equal to 38C (100.4F), Mild Pain (1 - 3)  cyclobenzaprine 5 milliGRAM(s) Oral every 8 hours PRN Muscle Spasm  traMADol 50 milliGRAM(s) Oral every 6 hours PRN Moderate Pain (4 - 6)  traMADol 100 milliGRAM(s) Oral every 6 hours PRN Severe Pain (7 - 10)      PHYSICAL EXAM:  Vital Signs Last 24 Hrs  T(C): 37.2 (16 Dec 2021 05:59), Max: 37.3 (15 Dec 2021 20:51)  T(F): 99 (16 Dec 2021 05:59), Max: 99.1 (15 Dec 2021 20:51)  HR: 65 (16 Dec 2021 05:59) (64 - 73)  BP: 143/82 (16 Dec 2021 05:59) (120/84 - 153/88)  BP(mean): --  RR: 16 (16 Dec 2021 05:59) (16 - 18)  SpO2: 100% (16 Dec 2021 05:59) (97% - 100%)    CAPILLARY BLOOD GLUCOSE      POCT Blood Glucose.: 110 mg/dL (15 Dec 2021 20:37)  POCT Blood Glucose.: 105 mg/dL (15 Dec 2021 12:06)  POCT Blood Glucose.: 83 mg/dL (15 Dec 2021 08:21)    I&O's Summary    15 Dec 2021 07:01  -  16 Dec 2021 07:00  --------------------------------------------------------  IN: 200 mL / OUT: 2280 mL / NET: -2080 mL        CONSTITUTIONAL: NAD, well-developed, C collar in place.   HEENT: hemovac in place draining serosanginous fluid from posterior of neck.   RESPIRATORY: Normal respiratory effort; lungs are clear to auscultation bilaterally  CARDIOVASCULAR: Regular rate and rhythm, normal S1 and S2, no murmur/rub/gallop; No lower extremity edema; Peripheral pulses are 2+ bilaterally  ABDOMEN: Nontender to palpation, normoactive bowel sounds, no rebound/guarding;   MUSCLOSKELETAL: no clubbing or cyanosis of digits; no joint swelling or tenderness to palpation  PSYCH: A+O to person, place, and time; affect appropriate    LABS:                        10.7   9.60  )-----------( 310      ( 15 Dec 2021 08:12 )             32.2     12-15    137  |  103  |  8   ----------------------------<  81  3.7   |  23  |  0.76    Ca    8.0<L>      15 Dec 2021 08:12  Phos  2.9     12-14  Mg     2.10     12-14    TPro  6.2  /  Alb  2.4<L>  /  TBili  0.8  /  DBili  x   /  AST  24  /  ALT  51<H>  /  AlkPhos  101  12-14    PT/INR - ( 14 Dec 2021 07:16 )   PT: 13.3 sec;   INR: 1.16 ratio         PTT - ( 14 Dec 2021 07:16 )  PTT:30.1 sec        IMAGING:    CT  C Spine:    IMPRESSION: Disc space narrowing endplates chronic changes and erosions   of the endplates are seen at the C4-5 level which is secondary to   patient's known discitis/osteomyelitis.    Degenerative changes as described above.      MR Spine:    CERVICAL SPINE:  1.  Discitis/osteomyelitis at C4-C5, with adjacent thin (4 mm) right   epidural fluid collection resulting in mild to moderate spinal canal   stenosis, as detailed above.  2.  Large prevertebral phlegmon and/or early developing abscess extending   from C2 to C6.  3.  Cervical spondylosis, as detailed above.    THORACIC SPINE:  1.  No suggestion of discitis/osteomyelitis, epidural fluid collection,   within spinal canal stenosis or disc herniation within the thoracic spine.  2.  Small bilateral pleural effusions, right greater than left.    LUMBAR SPINE:  1.  Discitis/osteomyelitis at L3-L4, with adjacent trace epidural   phlegmon without discrete appreciable epidural fluid collection.  2.  Bilateral psoas muscle abscesses and extensive surrounding   paravertebral phlegmon adjacent to the setting of osteomyelitis at L3-L4.  3.  Lumbar spondylosis, most significant for severe spinal canal stenosis   at L4-L5 and multilevel moderate to severe neuroforaminal narrowing, as   detailed above.

## 2021-12-16 NOTE — H&P ADULT - PROBLEM SELECTOR PLAN 2
CT abd pelvis w/ L psoas abscess.  MRI showed psoas abscess bilaterally.  Bacteremia with Staph aureus    -IR consulted for possible psoas abscess drainage - evaluated and found abscess to be too small to drain.   -Abx as above  -F/u BCx as above.

## 2021-12-16 NOTE — CONSULT NOTE ADULT - PROBLEM SELECTOR RECOMMENDATION 9
-cont nafcillin 2 gm iv q4  -plan 8 weeks iv abx for bacteremia/spinal OM   -repeat bcx negative  -TTE with no obvious valvular issues

## 2021-12-16 NOTE — PROGRESS NOTE ADULT - PROBLEM SELECTOR PLAN 3
MSSA Bacteremia present. Blood Cx (12/10) positive for Staph aureus x2 and Staph Epi x 1 (likely contaminant). Likely from osteomyelitis w/ abscess formation.  - Abx as above  - Repeat Cx (12/12) NGTD  - TTE w/o vegetation  - May need GIGI to r/o vegetation    #Asymptomatic Bacteriuria:   - UA + UCx w/ E coli, however given asymptomatic will not treat, as per ID. Blood Cx (12/10) positive for Staph aureus x2 and Staph Epi x 1 (likely contaminant), howevere repeat Cx (12/12) NGTD. Likely from osteomyelitis w/ abscess formation.  - Abx as above  - TTE w/o vegetation  - Given clearance of BCx, spoke with ID, no need for a GIGI at this time    #Asymptomatic Bacteriuria: - present on admission   - UA + UCx w/ E coli, however given asymptomatic will not treat, as per ID.

## 2021-12-16 NOTE — PHYSICAL THERAPY INITIAL EVALUATION ADULT - DIAGNOSIS, PT EVAL
Pt admitted for management; s/p C3-C6 laminectomy and PSF; pt presents with decreased strength and decreased balance.

## 2021-12-16 NOTE — H&P ADULT - ASSESSMENT
67 yo M w/o any known PMH presented with worsening back pain w/ associated weakness found to have Staph aureus bacteremia in the setting of a L psoas abscess and cervical and lumbar osteomyelitis w/ developing phelgmon/abscess formation, now s/p C3-C6 laminectomy and PSF. Patient to continue 6 weeks nafcillin for full tx of osteomyelitis.

## 2021-12-16 NOTE — H&P ADULT - HISTORY OF PRESENT ILLNESS
Patient is a 67 y/o M w/ PMH of hypertension and pre-diabetes who presented for worsening back pain, found to have cervical and lumbar osteomyelitis.    On Nov 15 (almost 1 month prior to admission), upon waking up in the morning, he felt the back pain and leg weakness that he couldn't walk. He denied any trauma or injury to the area. He felt the severe pain all over his spine from his neck down to his lumbar area and shooting pain toward his legs. He never had such pain episodes like this before. He denied bladder/ bowel incontinence. He endorsed increased urinary frequency. No N/V/D, fever, chills, chest pain, abdominal pain, recent surgery or sick contacts.       BCx were positive for MSSA Bacteremia. MRI significant for cervical and lumbar osteomyelitis w/ developing phelgmon/abscess formation. ID consulted, initially patient was on Vanc/Zosyn, however patient was de-escalated to Nafcillin given MSSA bacteremia. Orthopedics was consulted. Patient underwent C3-6 laminectomy w/ PSF at [facility] w/ Dr. Lara. No reported complications. Patient returned to [facility] for further management.

## 2021-12-16 NOTE — H&P ADULT - ATTENDING COMMENTS
67 yo M with pmh of DM and Hypertension, presented with back pain.     # Sepsis- Pt met sepsis criteria on admission with HR of 107 and WBC > 12. Likely secondary to Spine OM/Discisits due to Staph aureus   MRI with findings of " Discitis/osteomyelitis of C4/C5 with prevertebral edema/phelgmon extending from C2/C3 through C5/C6 resulting in flattening of the cord without cord edema.  Additional area of discitis/osteomyelitis at L3/L4.  Bilateral psoas abscesses.  Blood culture  12/10 with finding of Staph aureus and Staph epidermidis, while UC indicates E-coli.  s/p s/p C3-6 lami/PSF on 12/14   On Nafcillin 2 gm Q 4 hours  IR eval-psoas abscess too small for drainage at this time. No indication for aspiration/sampling as already have positive blood cultures to guide therapy. If patient does not imrove on IV antibiotics, can consider interval re-imaging in 1 week to assess for development of abscess, reconsult prn  TTE- Grossly normal LV   systolic function. Normal left ventricular diastolic function. Unable to exclude endocarditis  DW ID attending Dr Vasquez – Plan for 6 week course of Nafcillin     # Hypophosphatemia- Replete  # Type II DM- AIC- 6.3. Monitor BS    # DVT Prox- HSQ.  # Dispo- DW SW- pt undocumented, SW working on options-? [place] vs cost share

## 2021-12-16 NOTE — CONSULT NOTE ADULT - ASSESSMENT
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    LE weakness, MSSA bacteremia, cervical and lumbar osteomyelitis, psoas abscess, s/p C3-C6 laminectomy       Piter Vasquez  Attending Physician   Division of Infectious Disease  Pager #[phone]  Available on Microsoft Teams also  After 5pm/weekend or no response, call #[phone]
no

## 2021-12-16 NOTE — PROGRESS NOTE ADULT - PROBLEM SELECTOR PLAN 1
Patient presented with severe back pain (10/10 maximum intensity), with onset approximately one month ago, care delayed since patient was in [facility]. MRI w/ discitis/ osteomyelitis in C4/C5 with prevertebral/ phlegmon w/ concern for developing abscess extending from C2 to C6 levels and discitis/ osteomyelitis in L3/L4 level and an extensive paravertebral phlegmon across L3-4. High concern for MSSA osteomyelitis given MSSA bacteremia (BCx 12/10). Now s/p C3-C6 laminectomy w/ PSF by ortho spine.   -Continue Nafcillin 2g Q4hrs  - Repeat BCx (12/12) NGTD  - Will f/u w/ ortho if any intervention will be performed on lumbar osteomyelitis  - PT/OT eval MRI w/ discitis/ osteomyelitis in C4/C5 with prevertebral/ phlegmon w/ concern for developing abscess extending from C2 to C6 levels and discitis/ osteomyelitis in L3/L4 level and an extensive paravertebral phlegmon across L3-4.  Now s/p C3-C6 laminectomy w/ PSF by ortho spine.   -Continue Nafcillin 2g Q4hrs - patient will need this for 6 weeks per ID from 12/14  - Per ID will need PICC line, as well as weekly ESR/CRP to trend osteomyelitis   - Repeat BCx (12/12) NGTD  - No plans per ortho for lumbar surgery, medical management at this time.   - Ortho to manage drain  - PT eval pending  - Activity as tolerated.

## 2021-12-16 NOTE — PHYSICAL THERAPY INITIAL EVALUATION ADULT - PERTINENT HX OF CURRENT PROBLEM, REHAB EVAL
67 yo M w/o any known PMH presented with worsening back pain w/ associated weakness found to have Staph aureus bacteremia in the setting of a L psoas abscess and cervical and lumbar osteomyelitis w/ developing phelgmon/abscess formation, now s/p C3-C6 laminectomy and PSF.

## 2021-12-16 NOTE — PROGRESS NOTE ADULT - PROBLEM SELECTOR PLAN 6
Diet: Consistent Carb  DVT: Held 2/2 recent surgery. Will consult ortho when Ok to restart  Dispo: in patient Diet: Consistent Carb  DVT: SCDs - chemical prophylaxis contraindicated   Dispo: in patient.

## 2021-12-16 NOTE — PROGRESS NOTE ADULT - PROBLEM SELECTOR PLAN 4
Patient reports he has been told he has diabetes but is not on any medications for it. A1c in patient 6.3.   - No indication for SSI.  - Patient is NOT a diabetic, has never been formally diagnosed with diabetes just self-reported, however will need close outpatient follow up to prevent progression to DM2. Patient reports he was diagnosed w/ essential HTN, however takes no meds for this. BP while in patient are WNL  - Will trend BPs and control BP accordingly.

## 2021-12-16 NOTE — PHYSICAL THERAPY INITIAL EVALUATION ADULT - PATIENT PROFILE REVIEW, REHAB EVAL
No Active Activity Order in the Computer; spoke with ALLY Smith prior to PT evaluation--> Pt OK for PT consult/OOB activity./yes

## 2021-12-16 NOTE — PROGRESS NOTE ADULT - ASSESSMENT
67 yo M w/o any known PMH presented with worsening back pain w/ associated weakness found to have Staph aureus bacteremia in the setting of a L psoas abscess and cervical and lumbar osteomyelitis w/ developing phelgmon/abscess formation, now s/p C3-C6 laminectomy and PSF.  65 yo M w/o any known PMH presented with worsening back pain w/ associated weakness found to have Staph aureus bacteremia in the setting of a L psoas abscess and cervical and lumbar osteomyelitis w/ developing phelgmon/abscess formation, now s/p C3-C6 laminectomy and PSF. Patient to continue 6 weeks nafcillin for full tx of osteomyelitis.

## 2021-12-16 NOTE — PHYSICAL THERAPY INITIAL EVALUATION ADULT - LEVEL OF INDEPENDENCE: SUPINE/SIT, REHAB EVAL
Logrolling first to maintain spinal precautions/minimum assist (75% patients effort)/moderate assist (50% patients effort)

## 2021-12-17 LAB
ANION GAP SERPL CALC-SCNC: 12 MMOL/L — SIGNIFICANT CHANGE UP (ref 7–14)
BUN SERPL-MCNC: 9 MG/DL — SIGNIFICANT CHANGE UP (ref 7–23)
CALCIUM SERPL-MCNC: 8.1 MG/DL — LOW (ref 8.4–10.5)
CHLORIDE SERPL-SCNC: 99 MMOL/L — SIGNIFICANT CHANGE UP (ref 98–107)
CO2 SERPL-SCNC: 22 MMOL/L — SIGNIFICANT CHANGE UP (ref 22–31)
CREAT SERPL-MCNC: 0.82 MG/DL — SIGNIFICANT CHANGE UP (ref 0.5–1.3)
CRP SERPL-MCNC: 106.8 MG/L — HIGH
CULTURE RESULTS: SIGNIFICANT CHANGE UP
CULTURE RESULTS: SIGNIFICANT CHANGE UP
ERYTHROCYTE [SEDIMENTATION RATE] IN BLOOD: 44 MM/HR — HIGH (ref 1–15)
GLUCOSE SERPL-MCNC: 96 MG/DL — SIGNIFICANT CHANGE UP (ref 70–99)
HCT VFR BLD CALC: 35.1 % — LOW (ref 39–50)
HGB BLD-MCNC: 12 G/DL — LOW (ref 13–17)
MAGNESIUM SERPL-MCNC: 2 MG/DL — SIGNIFICANT CHANGE UP (ref 1.6–2.6)
MCHC RBC-ENTMCNC: 30.9 PG — SIGNIFICANT CHANGE UP (ref 27–34)
MCHC RBC-ENTMCNC: 34.2 GM/DL — SIGNIFICANT CHANGE UP (ref 32–36)
MCV RBC AUTO: 90.5 FL — SIGNIFICANT CHANGE UP (ref 80–100)
NRBC # BLD: 0 /100 WBCS — SIGNIFICANT CHANGE UP
NRBC # FLD: 0 K/UL — SIGNIFICANT CHANGE UP
PHOSPHATE SERPL-MCNC: 2.8 MG/DL — SIGNIFICANT CHANGE UP (ref 2.5–4.5)
PLATELET # BLD AUTO: 252 K/UL — SIGNIFICANT CHANGE UP (ref 150–400)
POTASSIUM SERPL-MCNC: 4.4 MMOL/L — SIGNIFICANT CHANGE UP (ref 3.5–5.3)
POTASSIUM SERPL-SCNC: 4.4 MMOL/L — SIGNIFICANT CHANGE UP (ref 3.5–5.3)
RBC # BLD: 3.88 M/UL — LOW (ref 4.2–5.8)
RBC # FLD: 15.4 % — HIGH (ref 10.3–14.5)
SODIUM SERPL-SCNC: 133 MMOL/L — LOW (ref 135–145)
SPECIMEN SOURCE: SIGNIFICANT CHANGE UP
SPECIMEN SOURCE: SIGNIFICANT CHANGE UP
WBC # BLD: 6.57 K/UL — SIGNIFICANT CHANGE UP (ref 3.8–10.5)
WBC # FLD AUTO: 6.57 K/UL — SIGNIFICANT CHANGE UP (ref 3.8–10.5)

## 2021-12-17 PROCEDURE — 99222 1ST HOSP IP/OBS MODERATE 55: CPT

## 2021-12-17 PROCEDURE — 99233 SBSQ HOSP IP/OBS HIGH 50: CPT | Mod: GC

## 2021-12-17 PROCEDURE — 99232 SBSQ HOSP IP/OBS MODERATE 35: CPT

## 2021-12-17 RX ADMIN — NAFCILLIN 200 GRAM(S): 10 INJECTION, POWDER, FOR SOLUTION INTRAVENOUS at 05:23

## 2021-12-17 RX ADMIN — NAFCILLIN 200 GRAM(S): 10 INJECTION, POWDER, FOR SOLUTION INTRAVENOUS at 10:30

## 2021-12-17 RX ADMIN — NAFCILLIN 200 GRAM(S): 10 INJECTION, POWDER, FOR SOLUTION INTRAVENOUS at 17:23

## 2021-12-17 RX ADMIN — PANTOPRAZOLE SODIUM 40 MILLIGRAM(S): 20 TABLET, DELAYED RELEASE ORAL at 05:23

## 2021-12-17 RX ADMIN — NAFCILLIN 200 GRAM(S): 10 INJECTION, POWDER, FOR SOLUTION INTRAVENOUS at 01:21

## 2021-12-17 RX ADMIN — NAFCILLIN 200 GRAM(S): 10 INJECTION, POWDER, FOR SOLUTION INTRAVENOUS at 13:16

## 2021-12-17 RX ADMIN — NAFCILLIN 200 GRAM(S): 10 INJECTION, POWDER, FOR SOLUTION INTRAVENOUS at 21:39

## 2021-12-17 RX ADMIN — SODIUM CHLORIDE 75 MILLILITER(S): 9 INJECTION, SOLUTION INTRAVENOUS at 05:22

## 2021-12-17 NOTE — PROGRESS NOTE ADULT - PROBLEM SELECTOR PLAN 6
Diet: Consistent Carb  DVT: SCDs - chemical prophylaxis contraindicated   Dispo: in patient. Diet: Consistent Carb  DVT: SCDs - chemical prophylaxis contraindicated   Dispo: in patient.  - PT recs rehab

## 2021-12-17 NOTE — PROGRESS NOTE ADULT - ASSESSMENT
65 yo M w/o any known PMH presented with worsening back pain w/ associated weakness found to have Staph aureus bacteremia in the setting of a L psoas abscess and cervical and lumbar osteomyelitis w/ developing phelgmon/abscess formation, now s/p C3-C6 laminectomy and PSF. Patient to continue 6 weeks nafcillin for full tx of osteomyelitis.

## 2021-12-17 NOTE — PROGRESS NOTE ADULT - PROBLEM SELECTOR PLAN 1
-cont nafcillin  -picc  -TTE - no significant valvular disease   -repeat bcx negative  -plan 8 weeks iv abx for spinal OM/bacteremia/psoas abscess

## 2021-12-17 NOTE — PROGRESS NOTE ADULT - ATTENDING COMMENTS
65 yo M with pmh of DM and Hypertension, presented with back pain and found to have discitis/OM now s/p laminectomy and [facility] and transferred back to [facility] for further management. Sepsis, resolved. 2/2 OM + psoas abscess. BCx 12/10 with MSSA and S. epidermidis. No intervention for psoas abscess. S/p laminectomy C3-C6 on 12/14. C/w nafcillin 2g q4 hours. Echo normal but did not exclude endocarditis. ID following. Will need 6 week course of antibiotics. Patient is a dispo issue given no insurance. Dispo pending.     Plan discussed with HS.

## 2021-12-17 NOTE — PROGRESS NOTE ADULT - SUBJECTIVE AND OBJECTIVE BOX
ORTHO PROGRESS NOTE     Patient resting comfortably without complaint  Walking with physical therapy       Vital Signs Last 24 Hrs  T(C): 37.6 (17 Dec 2021 05:16), Max: 37.6 (17 Dec 2021 05:16)  T(F): 99.7 (17 Dec 2021 05:16), Max: 99.7 (17 Dec 2021 05:16)  HR: 66 (17 Dec 2021 05:16) (66 - 82)  BP: 143/78 (17 Dec 2021 05:16) (126/80 - 143/78)  BP(mean): --  RR: 17 (17 Dec 2021 05:16) (17 - 17)  SpO2: 100% (17 Dec 2021 05:16) (100% - 100%)      Neck: Dressing clean/ dry/intact              HV drain: 5/35cc    UE: Deltoids: 5/5         Biceps: 5/5         Triceps: 5/5          Wrist ext: 5/5         : 5/5        A/P :  Stable              PT-WBAT             Pain control             Incentive spirometer             Follow up AM labs/ ROMARIO output

## 2021-12-17 NOTE — CONSULT NOTE ADULT - SUBJECTIVE AND OBJECTIVE BOX
Patient is a 66y old  Male who presents with a chief complaint of Cervical and Lumbar Osteomyelitis (17 Dec 2021 08:13)      HPI:  Patient is a 65 y/o M w/ PMH of hypertension and pre-diabetes who presented for worsening back pain, found to have cervical and lumbar osteomyelitis.    On Nov 15 (almost 1 month prior to admission), upon waking up in the morning, he felt the back pain and leg weakness that he couldn't walk. He denied any trauma or injury to the area. He felt the severe pain all over his spine from his neck down to his lumbar area and shooting pain toward his legs. He never had such pain episodes like this before. He denied bladder/ bowel incontinence. He endorsed increased urinary frequency. No N/V/D, fever, chills, chest pain, abdominal pain, recent surgery or sick contacts.       BCx were positive for MSSA Bacteremia. MRI significant for cervical and lumbar osteomyelitis w/ developing phelgmon/abscess formation. ID consulted, initially patient was on Vanc/Zosyn, however patient was de-escalated to Nafcillin given MSSA bacteremia. Orthopedics was consulted. Patient underwent C3-6 laminectomy w/ [place]F [place] w/ Dr. Lara. No reported complications. Patient returned to [facility] for further management.    (16 Dec 2021 08:00)    s/p c3-c6 laminectomy on 12/14  drain removed today  collar in place  patient reports upper extremity strength improving, sensation improving.   min assist with PT, independent at baseline.  pain controlled with tramadol    REVIEW OF SYSTEMS  + weakness    PAST MEDICAL & SURGICAL HISTORY   Essential hypertension  Prediabetes  History of hemorrhoidectomy      SOCIAL HISTORY  Smoking - former smoker  EtOH - Denied   Drugs - Denied    FUNCTIONAL HISTORY  Lives in [facility] with his wife, in [place] is staying with his sister in private house with 6 steps to enter, bedroom on main floor  Independent at baseline    CURRENT FUNCTIONAL STATUS  12/16  Bed Mobility: Sit to Supine:     · Level of [place]	minimum assist (75% patients effort); moderate assist (50% patients effort)  · Physical Assist/Nonphysical Assist	1 person assist; verbal cues    Bed Mobility: Supine to Sit:     · Level of [place]	minimum assist (75% patients effort); moderate assist (50% patients effort); Logrolling first to maintain spinal precautions  · Physical Assist/Nonphysical Assist	1 person assist; verbal cues    Transfer: Sit to Stand:     · Level of [place]	minimum assist (75% patients effort)  · Physical Assist/Nonphysical Assist	1 person assist; verbal cues  · Weight-Bearing Restrictions	weight-bearing as tolerated  · Assistive Device	rolling walker    Transfer: Stand to Sit:     · Level of [place]	minimum assist (75% patients effort)  · Physical Assist/Nonphysical Assist	1 person assist; verbal cues  · Weight-Bearing Restrictions	weight-bearing as tolerated  · Assistive Device	rolling walker    Gait Skills:     · Level of [place]	minimum assist (75% patients effort)  · Physical Assist/Nonphysical Assist	1 person assist; verbal cues  · Weight-Bearing Restrictions	weight-bearing as tolerated  · Assistive Device	rolling walker  · Gait Distance	20ft    Gait Analysis:     · Gait Pattern Used	3-point gait  · Gait Deviations Noted	decreased adelita; decreased step length; decreased stride length  · Impairments Contributing to Gait Deviations	impaired balance; decreased strength    Balance Skills Assessment:     · Sitting Balance: Static	good balance  · Sitting Balance: Dynamic	fair balance  · Sit-to-Stand Balance	fair minus  · Standing Balance: Static	fair minus  · Standing Balance: Dynamic	poor balance  · Systems Impairment Contributing to Balance Disturbance	musculoskeletal; neuromuscular  · Identified Impairments Contributing to Balance Disturbance	decreased strength; impaired postural control      FAMILY HISTORY   No pertinent family history in first degree relatives        RECENT LABS/IMAGING  CBC Full  -  ( 17 Dec 2021 07:45 )  WBC Count : 6.57 K/uL  RBC Count : 3.88 M/uL  Hemoglobin : 12.0 g/dL  Hematocrit : 35.1 %  Platelet Count - Automated : 252 K/uL  Mean Cell Volume : 90.5 fL  Mean Cell Hemoglobin : 30.9 pg  Mean Cell Hemoglobin Concentration : 34.2 gm/dL  Auto Neutrophil # : x  Auto Lymphocyte # : x  Auto Monocyte # : x  Auto Eosinophil # : x  Auto Basophil # : x  Auto Neutrophil % : x  Auto Lymphocyte % : x  Auto Monocyte % : x  Auto Eosinophil % : x  Auto Basophil % : x    12-17    133<L>  |  99  |  9   ----------------------------<  96  4.4   |  22  |  0.82    Ca    8.1<L>      17 Dec 2021 07:45  Phos  2.8     12-17  Mg     2.00     12-17    TPro  5.7<L>  /  Alb  2.5<L>  /  TBili  0.4  /  DBili  x   /  AST  23  /  ALT  33  /  AlkPhos  83  12-16        VITALS  T(C): 37.2 (12-17-21 @ 12:00), Max: 37.6 (12-17-21 @ 05:16)  HR: 71 (12-17-21 @ 12:00) (66 - 74)  BP: 136/77 (12-17-21 @ 12:00) (126/80 - 143/78)  RR: 17 (12-17-21 @ 12:00) (17 - 17)  SpO2: 100% (12-17-21 @ 12:00) (100% - 100%)  Wt(kg): --    ALLERGIES  No Known Allergies      MEDICATIONS   acetaminophen     Tablet .. 650 milliGRAM(s) Oral every 6 hours PRN  cyclobenzaprine 5 milliGRAM(s) Oral every 8 hours PRN  dextrose 5% + lactated ringers. 1000 milliLiter(s) IV Continuous <Continuous>  nafcillin  IVPB      nafcillin  IVPB 2 Gram(s) IV Intermittent every 4 hours  pantoprazole    Tablet 40 milliGRAM(s) Oral before breakfast  senna 2 Tablet(s) Oral at bedtime  traMADol 50 milliGRAM(s) Oral every 6 hours PRN  traMADol 100 milliGRAM(s) Oral every 6 hours PRN      ----------------------------------------------------------------------------------------  PHYSICAL EXAM  Constitutional - NAD, Comfortable  HEENT - NCAT, EOMI   Chest - no respiratory distress  Cardiovascular - RRR, S1S2, No murmurs  Abdomen -   Soft, NTND  Extremities - No C/C/E, No calf tenderness   Neurologic Exam -                    Cognitive - Awake, Alert, AAO to self, place, date, year, situation     Communication - Fluent, No dysarthria     Cranial Nerves - CN 2-12 intact     Motor -                      LEFT    UE - 4/5                    RIGHT UE - 4/5                    LEFT    LE - 4/5                    RIGHT LE - 4/5        Sensory - impaired to LT b/l UE, improving per patient      Balance - WNL Static  Psychiatric - Mood stable, Affect WNL  ----------------------------------------------------------------------------------------  ASSESSMENT/PLAN 67 yo m with discitis/ osteomyelitis in C4/C5 with prevertebral/ phlegmon w/ concern for developing abscess extending from C2 to C6 levels and discitis/ osteomyelitis in L3/L4 level and an extensive paravertebral phlegmon across L3-4.  Now s/p C3-C6 laminectomy w/ PSF by ortho spine.   psoas abscess b/l, with staph aureus  TTE w/o vegetation  plan for picc to continue Nafcillin 2mg q 4, to continue for 6 weeks from 12/14  Pain -tramadol  DVT PPX - scd  continue bedside PT, please request OT  turn and position q 2 to prevent skin breakdown  Rehab -    Recommend ACUTE inpatient rehabilitation for the functional deficits consisting of 3 hours of therapy/day & 24 hour RN/daily PMR physician for comorbid medical management. Will continue to follow for ongoing rehab needs and recommendations.     currently min assist, patient is independent at baseline.  Would benefit from sher bed at acute rehab if available as per VENESSA patient does not have insurance.  
ORQUIDEA MCCARTNEY 66y Male  MRN-[id number]    Patient is a 66y old  Male who presents with a chief complaint of Cervical and Lumbar Osteomyelitis (16 Dec 2021 08:00)      HPI:  Patient is a 67 y/o M w/ PMH of hypertension and pre-diabetes who presented for worsening back pain, found to have cervical and lumbar osteomyelitis.    On Nov 15 (almost 1 month prior to admission), upon waking up in the morning, he felt the back pain and leg weakness that he couldn't walk. He denied any trauma or injury to the area. He felt the severe pain all over his spine from his neck down to his lumbar area and shooting pain toward his legs. He never had such pain episodes like this before. He denied bladder/ bowel incontinence. He endorsed increased urinary frequency. No N/V/D, fever, chills, chest pain, abdominal pain, recent surgery or sick contacts.       BCx were positive for MSSA Bacteremia. MRI significant for cervical and lumbar osteomyelitis w/ developing phelgmon/abscess formation. ID consulted, initially patient was on Vanc/Zosyn, however patient was de-escalated to Nafcillin given MSSA bacteremia. Orthopedics was consulted. Patient underwent C3-6 laminectomy w/ PSF at [facility] w/ Dr. Lara. No reported complications. Patient returned to [facility] for further management.    (16 Dec 2021 08:00)    ID called for abx management     PAST MEDICAL & SURGICAL HISTORY:  Essential hypertension    Prediabetes  A1c 6.3 12/13    History of hemorrhoidectomy        Allergies    No Known Allergies    Intolerances        ANTIMICROBIALS:  nafcillin  IVPB    nafcillin  IVPB 2 every 4 hours      MEDICATIONS  (STANDING):  dextrose 5% + lactated ringers. 1000 milliLiter(s) (75 mL/Hr) IV Continuous <Continuous>  nafcillin  IVPB      nafcillin  IVPB 2 Gram(s) IV Intermittent every 4 hours  pantoprazole    Tablet 40 milliGRAM(s) Oral before breakfast  senna 2 Tablet(s) Oral at bedtime      Social History  Smoking: no  Etoh: no  Drug use: no      FAMILY HISTORY:  No pertinent family history in first degree relatives        Vital Signs Last 24 Hrs  T(C): 37.2 (16 Dec 2021 14:11), Max: 37.3 (15 Dec 2021 20:51)  T(F): 98.9 (16 Dec 2021 14:11), Max: 99.1 (15 Dec 2021 20:51)  HR: 82 (16 Dec 2021 14:11) (65 - 82)  BP: 142/75 (16 Dec 2021 14:11) (142/75 - 153/88)  BP(mean): --  RR: 17 (16 Dec 2021 14:11) (16 - 18)  SpO2: 100% (16 Dec 2021 14:11) (100% - 100%)    CBC Full  -  ( 16 Dec 2021 08:12 )  WBC Count : 8.36 K/uL  RBC Count : 3.36 M/uL  Hemoglobin : 10.1 g/dL  Hematocrit : 30.1 %  Platelet Count - Automated : 307 K/uL  Mean Cell Volume : 89.6 fL  Mean Cell Hemoglobin : 30.1 pg  Mean Cell Hemoglobin Concentration : 33.6 gm/dL  Auto Neutrophil # : x  Auto Lymphocyte # : x  Auto Monocyte # : x  Auto Eosinophil # : x  Auto Basophil # : x  Auto Neutrophil % : x  Auto Lymphocyte % : x  Auto Monocyte % : x  Auto Eosinophil % : x  Auto Basophil % : x    12-16    135  |  101  |  3<L>  ----------------------------<  99  3.2<L>   |  24  |  0.71    Ca    7.8<L>      16 Dec 2021 08:12  Phos  2.3     12-16  Mg     1.60     12-16    TPro  5.7<L>  /  Alb  2.5<L>  /  TBili  0.4  /  DBili  x   /  AST  23  /  ALT  33  /  AlkPhos  83  12-16    LIVER FUNCTIONS - ( 16 Dec 2021 08:12 )  Alb: 2.5 g/dL / Pro: 5.7 g/dL / ALK PHOS: 83 U/L / ALT: 33 U/L / AST: 23 U/L / GGT: x               MICROBIOLOGY:  .Blood Blood-Venous  12-12-21   No growth to date.  --  --      Clean Catch Clean Catch (Midstream)  12-10-21   >100,000 CFU/ml Escherichia coli ESBL  --  Escherichia coli ESBL      .Blood Blood-Peripheral  12-10-21   Growth in aerobic bottle: Staphylococcus aureus  See previous culture [id number]  Growth in anaerobic bottle: Staphylococcus epidermidis  Coag Negative Staphylococcus  Single set isolate, possible contaminant. Contact  Microbiology if susceptibility testing clinically  indicated.  --    Growth in aerobic and anaerobic bottles: Gram Positive Cocci in Clusters      .Blood Blood-Venous  12-10-21   Growth in aerobic and anaerobic bottles: Staphylococcus aureus  ***Blood Panel PCR results on this specimen are available  approximately 3 hours after the Gram stain result.***  Gram stain, PCR, and/or culture results may not always  correspond dueto difference in methodologies.  ************************************************************  This PCR assay was performed by multiplex PCR. This  Assay tests for 66 bacterial and resistance gene targets.  Please refer to the [facility] test directory  at [email or web address] for details.  --  Blood Culture PCR  Staphylococcus aureus        RADIOLOGY  < from: CT Cervical Spine No Cont (12.14.21 @ 03:30) >  IMPRESSION: Disc space narrowing endplates chronic changes and erosions   of the endplates are seen at the C4-5 level which is secondary to   patient's known discitis/osteomyelitis.    Degenerative changes as described above.      < from: Transthoracic Echocardiogram (12.14.21 @ 08:49) >  1. Mitral annular calcification, otherwise normal mitral  valve. Minimal mitral regurgitation.  2. Normal left ventricular internal dimensions and wall  thicknesses.  3. Endocardium not well visualized; grossly normal left  ventricular systolic function.  4. Normal left ventricular diastolic function.  5. The right ventricle is not well visualized; grossly  normal right ventricular systolic function.    < end of copied text >

## 2021-12-17 NOTE — PROGRESS NOTE ADULT - SUBJECTIVE AND OBJECTIVE BOX
ORQUIDEA MCCARTNEY 66y [id number]    Patient is a 66y old  Male who presents with a chief complaint of Cervical and Lumbar Osteomyelitis (17 Dec 2021 08:13)      Follow Up/CC:  ID following for bacteremia     Interval History/ROS: no fever, neck pain improving     Allergies    No Known Allergies    Intolerances        ANTIMICROBIALS:  nafcillin  IVPB    nafcillin  IVPB 2 every 4 hours      MEDICATIONS  (STANDING):  dextrose 5% + lactated ringers. 1000 milliLiter(s) (75 mL/Hr) IV Continuous <Continuous>  nafcillin  IVPB      nafcillin  IVPB 2 Gram(s) IV Intermittent every 4 hours  pantoprazole    Tablet 40 milliGRAM(s) Oral before breakfast  senna 2 Tablet(s) Oral at bedtime    MEDICATIONS  (PRN):  acetaminophen     Tablet .. 650 milliGRAM(s) Oral every 6 hours PRN Temp greater or equal to 38C (100.4F), Mild Pain (1 - 3)  cyclobenzaprine 5 milliGRAM(s) Oral every 8 hours PRN Muscle Spasm  traMADol 50 milliGRAM(s) Oral every 6 hours PRN Moderate Pain (4 - 6)  traMADol 100 milliGRAM(s) Oral every 6 hours PRN Severe Pain (7 - 10)        Vital Signs Last 24 Hrs  T(C): 37.2 (17 Dec 2021 12:00), Max: 37.6 (17 Dec 2021 05:16)  T(F): 98.9 (17 Dec 2021 12:00), Max: 99.7 (17 Dec 2021 05:16)  HR: 71 (17 Dec 2021 12:00) (66 - 74)  BP: 136/77 (17 Dec 2021 12:00) (126/80 - 143/78)  BP(mean): --  RR: 17 (17 Dec 2021 12:00) (17 - 17)  SpO2: 100% (17 Dec 2021 12:00) (100% - 100%)    CBC Full  -  ( 17 Dec 2021 07:45 )  WBC Count : 6.57 K/uL  RBC Count : 3.88 M/uL  Hemoglobin : 12.0 g/dL  Hematocrit : 35.1 %  Platelet Count - Automated : 252 K/uL  Mean Cell Volume : 90.5 fL  Mean Cell Hemoglobin : 30.9 pg  Mean Cell Hemoglobin Concentration : 34.2 gm/dL  Auto Neutrophil # : x  Auto Lymphocyte # : x  Auto Monocyte # : x  Auto Eosinophil # : x  Auto Basophil # : x  Auto Neutrophil % : x  Auto Lymphocyte % : x  Auto Monocyte % : x  Auto Eosinophil % : x  Auto Basophil % : x    12-17    133<L>  |  99  |  9   ----------------------------<  96  4.4   |  22  |  0.82    Ca    8.1<L>      17 Dec 2021 07:45  Phos  2.8     12-17  Mg     2.00     12-17    TPro  5.7<L>  /  Alb  2.5<L>  /  TBili  0.4  /  DBili  x   /  AST  23  /  ALT  33  /  AlkPhos  83  12-16    LIVER FUNCTIONS - ( 16 Dec 2021 08:12 )  Alb: 2.5 g/dL / Pro: 5.7 g/dL / ALK PHOS: 83 U/L / ALT: 33 U/L / AST: 23 U/L / GGT: x               MICROBIOLOGY:  .Blood Blood-Venous  12-12-21   No growth to date.  --  --      Clean Catch Clean Catch (Midstream)  12-10-21   >100,000 CFU/ml Escherichia coli ESBL  --  Escherichia coli ESBL      .Blood Blood-Peripheral  12-10-21   Growth in aerobic bottle: Staphylococcus aureus  See previous culture [id number]  Growth in anaerobic bottle: Staphylococcus epidermidis  Coag Negative Staphylococcus  Single set isolate, possible contaminant. Contact  Microbiology if susceptibility testing clinically  indicated.  --    Growth in aerobic and anaerobic bottles: Gram Positive Cocci in Clusters      .Blood Blood-Venous  12-10-21   Growth in aerobic and anaerobic bottles: Staphylococcus aureus  ***Blood Panel PCR results on this specimen are available  approximately 3 hours after the Gram stain result.***  Gram stain, PCR, and/or culture results may not always  correspond dueto difference in methodologies.  ************************************************************  This PCR assay was performed by multiplex PCR. This  Assay tests for 66 bacterial and resistance gene targets.  Please refer to the [facility] test directory  at [email or web address] for details.  --  Blood Culture PCR  Staphylococcus aureus              v            RADIOLOGY

## 2021-12-17 NOTE — PROGRESS NOTE ADULT - PROBLEM SELECTOR PLAN 5
-day 4 of 56 of abx  -picc  -potential side effects of abx explained including GI, Cdiff, allergy issues, development of resisitance, etc.  -potential side effects of PICC explained including bleeding and infection  -weekly cbc, bun/creatinine, esr, crp

## 2021-12-17 NOTE — PROGRESS NOTE ADULT - SUBJECTIVE AND OBJECTIVE BOX
Hesham Jerez MD  Internal Medicine, PGY-1  Pager: [phone] / [facility]: [phone]    SUBJECTIVE / OVERNIGHT EVENTS:  - Pt seen and examined at bedside  - GENTRY  - Patient reports his pain on his back is still present, however well controlled. Patient only required 100 mg tramadol for pain       MEDICATIONS  (STANDING):  dextrose 5% + lactated ringers. 1000 milliLiter(s) (75 mL/Hr) IV Continuous <Continuous>  nafcillin  IVPB      nafcillin  IVPB 2 Gram(s) IV Intermittent every 4 hours  pantoprazole    Tablet 40 milliGRAM(s) Oral before breakfast  senna 2 Tablet(s) Oral at bedtime    MEDICATIONS  (PRN):  acetaminophen     Tablet .. 650 milliGRAM(s) Oral every 6 hours PRN Temp greater or equal to 38C (100.4F), Mild Pain (1 - 3)  cyclobenzaprine 5 milliGRAM(s) Oral every 8 hours PRN Muscle Spasm  traMADol 50 milliGRAM(s) Oral every 6 hours PRN Moderate Pain (4 - 6)  traMADol 100 milliGRAM(s) Oral every 6 hours PRN Severe Pain (7 - 10)      PHYSICAL EXAM:  Vital Signs Last 24 Hrs  T(C): 37.6 (17 Dec 2021 05:16), Max: 37.6 (17 Dec 2021 05:16)  T(F): 99.7 (17 Dec 2021 05:16), Max: 99.7 (17 Dec 2021 05:16)  HR: 66 (17 Dec 2021 05:16) (66 - 82)  BP: 143/78 (17 Dec 2021 05:16) (126/80 - 143/78)  BP(mean): --  RR: 17 (17 Dec 2021 05:16) (17 - 17)  SpO2: 100% (17 Dec 2021 05:16) (100% - 100%)    CAPILLARY BLOOD GLUCOSE        I&O's Summary    16 Dec 2021 07:01  -  17 Dec 2021 07:00  --------------------------------------------------------  IN: 1030 mL / OUT: 3010 mL / NET: -1980 mL        CONSTITUTIONAL: NAD, well-developed, C collar in place  RESPIRATORY: Normal respiratory effort; lungs are clear to auscultation bilaterally  CARDIOVASCULAR: Regular rate and rhythm, normal S1 and S2, no murmur/rub/gallop; No lower extremity edema; Peripheral pulses are 2+ bilaterally  ABDOMEN: Nontender to palpation, normoactive bowel sounds, no rebound/guarding; No hepatosplenomegaly  MUSCLOSKELETAL: no clubbing or cyanosis of digits; 3/5 strength b/l LE   PSYCH: A+O to person, place, and time; affect appropriate    LABS:                        12.0   6.57  )-----------( 252      ( 17 Dec 2021 07:45 )             35.1     12-16    135  |  101  |  3<L>  ----------------------------<  99  3.2<L>   |  24  |  0.71    Ca    7.8<L>      16 Dec 2021 08:12  Phos  2.3     12-16  Mg     1.60     12-16    TPro  5.7<L>  /  Alb  2.5<L>  /  TBili  0.4  /  DBili  x   /  AST  23  /  ALT  33  /  AlkPhos  83  12-16

## 2021-12-17 NOTE — PROGRESS NOTE ADULT - PROBLEM SELECTOR PLAN 1
MRI w/ discitis/ osteomyelitis in C4/C5 with prevertebral/ phlegmon w/ concern for developing abscess extending from C2 to C6 levels and discitis/ osteomyelitis in L3/L4 level and an extensive paravertebral phlegmon across L3-4.  Now s/p C3-C6 laminectomy w/ PSF by ortho spine.   -Continue Nafcillin 2g Q4hrs - patient will need this for 6 weeks per ID from 12/14  - Per ID will need PICC line, as well as weekly ESR/CRP to trend osteomyelitis   - Repeat BCx (12/12) NGTD  - No plans per ortho for lumbar surgery, medical management at this time.   - Ortho to manage drain  - PT eval pending  - Activity as tolerated. MRI w/ discitis/ osteomyelitis in C4/C5 with prevertebral/ phlegmon w/ concern for developing abscess extending from C2 to C6 levels and discitis/ osteomyelitis in L3/L4 level and an extensive paravertebral phlegmon across L3-4.  Now s/p C3-C6 laminectomy w/ PSF by ortho spine.   -Continue Nafcillin 2g Q4hrs - patient will need this for 6 weeks per ID from 12/14  - Per ID will need PICC line, as well as weekly ESR/CRP to trend osteomyelitis   - Repeat BCx (12/12) NGTD  - No plans per ortho for lumbar surgery, medical management at this time.   - Ortho to manage drain  - PT eval pending  - PM&R consult placed as per CM request for eval for rehab   - Activity as tolerated.

## 2021-12-17 NOTE — PROGRESS NOTE ADULT - ASSESSMENT
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    LE weakness, MSSA bacteremia, cervical and lumbar osteomyelitis, psoas abscess, s/p C3-C6 laminectomy       Piter Vasquez  Attending Physician   Division of Infectious Disease  Pager #[phone]  Available on Microsoft Teams also  After 5pm/weekend or no response, call #[phone]

## 2021-12-18 ENCOUNTER — TRANSCRIPTION ENCOUNTER (OUTPATIENT)
Age: 66
End: 2021-12-18

## 2021-12-18 PROCEDURE — 99233 SBSQ HOSP IP/OBS HIGH 50: CPT | Mod: GC

## 2021-12-18 RX ADMIN — Medication 650 MILLIGRAM(S): at 22:33

## 2021-12-18 RX ADMIN — NAFCILLIN 200 GRAM(S): 10 INJECTION, POWDER, FOR SOLUTION INTRAVENOUS at 06:33

## 2021-12-18 RX ADMIN — NAFCILLIN 200 GRAM(S): 10 INJECTION, POWDER, FOR SOLUTION INTRAVENOUS at 09:42

## 2021-12-18 RX ADMIN — PANTOPRAZOLE SODIUM 40 MILLIGRAM(S): 20 TABLET, DELAYED RELEASE ORAL at 06:32

## 2021-12-18 RX ADMIN — NAFCILLIN 200 GRAM(S): 10 INJECTION, POWDER, FOR SOLUTION INTRAVENOUS at 18:08

## 2021-12-18 RX ADMIN — NAFCILLIN 200 GRAM(S): 10 INJECTION, POWDER, FOR SOLUTION INTRAVENOUS at 13:59

## 2021-12-18 RX ADMIN — NAFCILLIN 200 GRAM(S): 10 INJECTION, POWDER, FOR SOLUTION INTRAVENOUS at 01:47

## 2021-12-18 RX ADMIN — NAFCILLIN 200 GRAM(S): 10 INJECTION, POWDER, FOR SOLUTION INTRAVENOUS at 22:16

## 2021-12-18 NOTE — DISCHARGE NOTE PROVIDER - NSDCFUADDAPPT_GEN_ALL_CORE_FT
Please follow up with the [address] [place] [address] resident clinic.   Please follow-up with your primary care doctor within 1-2 weeks after being discharged from the hospital for continued medical care. You can follow-up at the [address] [place] [address] clinic.  Please call: [phone]    Please follow-up with the orthopedic surgeon for continued medical care from your surgery.  It is recommended that you remain with the C-collar until 1/11/22.   The information is provided.      Please follow up with your primary care doctor within 1-2 weeks after being discharged from the hospital for continued medical care. You can follow-up at the [address] [place] [address] clinic.  Please call: [phone]    Please follow up with the orthopedic surgeon for continued medical care from your surgery.  It is recommended that you remain with the C-collar until 1/11/22.   The information is provided.     Please follow up with the infectious diseases clinic 5 weeks after discharge (week of 2/7/22). You can schedule an appointment by calling [phone]. Please follow up with your primary care doctor within 1-2 weeks after being discharged from the hospital for continued medical care. You can follow-up at the [address] [place] [address] clinic.  Please call: [phone]    Please follow up with Dr. Lara (orthopedic surgery) for continued medical care from your surgery. You can schedule an appointment by calling [phone].  It is recommended that you remain with the C-collar until 1/11/22.   The information is provided.     Please follow up with the infectious diseases clinic 5 weeks after discharge (week of 2/7/22). You can schedule an appointment by calling [phone].

## 2021-12-18 NOTE — PROGRESS NOTE ADULT - SUBJECTIVE AND OBJECTIVE BOX
Hesham Jerez MD  Internal Medicine, PGY-1  Pager: [phone] / [facility]: [phone]    SUBJECTIVE / OVERNIGHT EVENTS:  - Pt seen and examined at bedside  - Patient refused blood draws in AM  - Patient reports that he wants to be able to be more mobile and go to bathroom and perform tasks independently however he is too weak to do so. The patient denies any fevers, chills, nausea, vomiting, or increased pain.     MEDICATIONS  (STANDING):  dextrose 5% + lactated ringers. 1000 milliLiter(s) (75 mL/Hr) IV Continuous <Continuous>  nafcillin  IVPB      nafcillin  IVPB 2 Gram(s) IV Intermittent every 4 hours  pantoprazole    Tablet 40 milliGRAM(s) Oral before breakfast  senna 2 Tablet(s) Oral at bedtime    MEDICATIONS  (PRN):  acetaminophen     Tablet .. 650 milliGRAM(s) Oral every 6 hours PRN Temp greater or equal to 38C (100.4F), Mild Pain (1 - 3)  cyclobenzaprine 5 milliGRAM(s) Oral every 8 hours PRN Muscle Spasm  traMADol 50 milliGRAM(s) Oral every 6 hours PRN Moderate Pain (4 - 6)  traMADol 100 milliGRAM(s) Oral every 6 hours PRN Severe Pain (7 - 10)      PHYSICAL EXAM:  Vital Signs Last 24 Hrs  T(C): 37.1 (17 Dec 2021 21:37), Max: 37.2 (17 Dec 2021 12:00)  T(F): 98.7 (17 Dec 2021 21:37), Max: 98.9 (17 Dec 2021 12:00)  HR: 74 (17 Dec 2021 21:37) (71 - 74)  BP: 170/81 (17 Dec 2021 21:37) (136/77 - 170/81)  BP(mean): --  RR: 18 (17 Dec 2021 21:37) (17 - 18)  SpO2: 100% (17 Dec 2021 21:37) (100% - 100%)    CAPILLARY BLOOD GLUCOSE        I&O's Summary    17 Dec 2021 07:01  -  18 Dec 2021 07:00  --------------------------------------------------------  IN: 1920 mL / OUT: 750 mL / NET: 1170 mL      CONSTITUTIONAL: NAD, well-developed, C collar in place  RESPIRATORY: Normal respiratory effort; lungs are clear to auscultation bilaterally  CARDIOVASCULAR: Regular rate and rhythm, normal S1 and S2, no murmur/rub/gallop; No lower extremity edema; Peripheral pulses are 2+ bilaterally  ABDOMEN: Nontender to palpation, normoactive bowel sounds, no rebound/guarding; No hepatosplenomegaly  MUSCLOSKELETAL: no clubbing or cyanosis of digits; 3/5 strength b/l LE   PSYCH: A+O to person, place, and time; affect appropriate      LABS:                        12.0   6.57  )-----------( 252      ( 17 Dec 2021 07:45 )             35.1     12-17    133<L>  |  99  |  9   ----------------------------<  96  4.4   |  22  |  0.82    Ca    8.1<L>      17 Dec 2021 07:45  Phos  2.8     12-17  Mg     2.00     12-17    TPro  5.7<L>  /  Alb  2.5<L>  /  TBili  0.4  /  DBili  x   /  AST  23  /  ALT  33  /  AlkPhos  83  12-16

## 2021-12-18 NOTE — PROGRESS NOTE ADULT - ASSESSMENT
67 yo M w/o any known PMH presented with worsening back pain w/ associated weakness found to have Staph aureus bacteremia in the setting of a L psoas abscess and cervical and lumbar osteomyelitis w/ developing phelgmon/abscess formation, now s/p C3-C6 laminectomy and PSF. Patient to continue 8 weeks nafcillin for full tx of osteomyelitis.

## 2021-12-18 NOTE — PROGRESS NOTE ADULT - SUBJECTIVE AND OBJECTIVE BOX
ORTHO PROGRESS NOTE     Patient seen and examined this morning. No acute events overnight. Pain well controlled. Tolerating diet. Denies N/V/D/F/C. Ambulating well      Vital Signs Last 24 Hrs  T(C): 37.1 (17 Dec 2021 21:37), Max: 37.6 (17 Dec 2021 05:16)  T(F): 98.7 (17 Dec 2021 21:37), Max: 99.7 (17 Dec 2021 05:16)  HR: 74 (17 Dec 2021 21:37) (66 - 74)  BP: 170/81 (17 Dec 2021 21:37) (136/77 - 170/81)  BP(mean): --  RR: 18 (17 Dec 2021 21:37) (17 - 18)  SpO2: 100% (17 Dec 2021 21:37) (100% - 100%)      Neck: Dressing clean/ dry/intact              HV drain DC'd on 12/17    UE: Deltoids: 5/5         Biceps: 5/5         Triceps: 5/5          Wrist ext: 5/5         : 5/5        A/P :  Stable              PT-WBAT             Pain control             Incentive spirometer

## 2021-12-18 NOTE — DISCHARGE NOTE PROVIDER - NSDCCPCAREPLAN_GEN_ALL_CORE_FT
PRINCIPAL DISCHARGE DIAGNOSIS  Diagnosis: Acute osteomyelitis  Assessment and Plan of Treatment: Bone infections, also called osteomyelitis,occur when bacteria or other germs get inside a bone. This can happen if you have an infection in another part of your body that spreads through your blood. It can also happen if you have a wound or a broken bone (fracture) that breaks the skin. A wound or a fracture can allow germs from your skin or from outside of your body to spread to your bone.  Bone infections need to be treated quickly to:  •Prevent bone damage.  •Prevent the infection from spreading to other areas of your body.  You had Osteomyelitis in the hospital which you had a surgical procedure for. You were treated on antibiotics Nafcillin which you will need to continue to be on for a total of 8 week duration. Please make sure you get your daily dose of antibiotics at home. Please remain with the C-collar until 1/11/22, and follow-up with your Orthopedic surgeon. Please make sure to have close follow-up with your primary care doctor within 1-2 weeks after being discharged from the hospital.  If you notice worsening symptoms such as Fevers, chills, night sweat, Vomiting, diarrhea, muscle aches. Please call 911 and come to the hospital.      SECONDARY DISCHARGE DIAGNOSES  Diagnosis: Psoas abscess  Assessment and Plan of Treatment: On imaging you had a fluid collection on your Psoas Muscle near your hip. This is why you had pain in that area. The abscess was treated with the antibiotic Nafcillin. You had pain medication with tylenol. Please make sure to follow-up with your primary care doctor 1-2 weeks after being discharged from the hospital.    Diagnosis: 2019 novel coronavirus disease (COVID-19)  Assessment and Plan of Treatment: WHAT YOU NEED TO KNOW:  COVID-19 is the disease caused by a coronavirus first discovered in December 2019. Coronaviruses generally cause upper respiratory (nose, throat, and lung) infections, such as a cold. The 2019 virus spreads quickly and easily. It can be spread starting 2 to 3 days before symptoms even begin.  DISCHARGE INSTRUCTIONS:  Call your local emergency number (911 in the ) if:   •You have trouble breathing or shortness of breath at rest.  •You have chest pain or pressure that lasts longer than 5 minutes.  •You become confused or hard to wake.  •Your lips or face are blue.  Seek care immediately if:   •You have a fever of 104°F (40°C) or higher.  You contracted COVID-19 infection. You did not require oxygen supplementation or medications. We treated your cough with cough medicine. Overall, your infection improved on its own with rest and hydration. Please make sure to follow-up with your primary care doctor 1-2 weeks after you are dischrged from the hospital.     PRINCIPAL DISCHARGE DIAGNOSIS  Diagnosis: Acute osteomyelitis  Assessment and Plan of Treatment: Bone infections, also called osteomyelitis,occur when bacteria or other germs get inside a bone. This can happen if you have an infection in another part of your body that spreads through your blood. It can also happen if you have a wound or a broken bone (fracture) that breaks the skin. A wound or a fracture can allow germs from your skin or from outside of your body to spread to your bone.  Bone infections need to be treated quickly to:  •Prevent bone damage.  •Prevent the infection from spreading to other areas of your body.  .  You had Osteomyelitis in the hospital which you had a surgical procedure for. You were treated on antibiotics Nafcillin which you will need to continue to be on for a total of 8 week duration. Please make sure you get your daily dose of antibiotics at home. Please remain with the C-collar until 1/11/22, and follow-up with your Orthopedic surgeon. Please make sure to have close follow-up with your primary care doctor within 1-2 weeks after being discharged from the hospital.  .  If you notice worsening symptoms such as Fevers, chills, night sweat, Vomiting, diarrhea, muscle aches. Please call 911 and come to the hospital.      SECONDARY DISCHARGE DIAGNOSES  Diagnosis: Psoas abscess  Assessment and Plan of Treatment: On imaging you had a fluid collection on your Psoas Muscle near your hip. This is why you had pain in that area. The abscess was treated with the antibiotic Nafcillin. You had pain medication with tylenol. Please make sure to follow-up with your primary care doctor 1-2 weeks after being discharged from the hospital.    Diagnosis: 2019 novel coronavirus disease (COVID-19)  Assessment and Plan of Treatment: WHAT YOU NEED TO KNOW:  COVID-19 is the disease caused by a coronavirus first discovered in December 2019. Coronaviruses generally cause upper respiratory (nose, throat, and lung) infections, such as a cold. The 2019 virus spreads quickly and easily. It can be spread starting 2 to 3 days before symptoms even begin.  .  DISCHARGE INSTRUCTIONS:  Call your local emergency number (911 in the ) if:   •You have trouble breathing or shortness of breath at rest.  •You have chest pain or pressure that lasts longer than 5 minutes.  •You become confused or hard to wake.  •Your lips or face are blue.  .  Seek care immediately if:   •You have a fever of 104°F (40°C) or higher.  .  You contracted COVID-19 infection. You did not require oxygen supplementation or medications. We treated your cough with cough medicine. Overall, your infection improved on its own with rest and hydration. Please make sure to follow-up with your primary care doctor 1-2 weeks after you are dischrged from the hospital.     PRINCIPAL DISCHARGE DIAGNOSIS  Diagnosis: Acute osteomyelitis  Assessment and Plan of Treatment: Bone infections, also called osteomyelitis,occur when bacteria or other germs get inside a bone. This can happen if you have an infection in another part of your body that spreads through your blood. It can also happen if you have a wound or a broken bone (fracture) that breaks the skin. A wound or a fracture can allow germs from your skin or from outside of your body to spread to your bone.  Bone infections need to be treated quickly to:  •Prevent bone damage.  •Prevent the infection from spreading to other areas of your body.  .  You had Osteomyelitis in the hospital which you had a surgical procedure for. You were treated on antibiotics Nafcillin which you will need to continue to be on for a total of 8 week duration. Please make sure you get your antibiotics at home. Please  follow-up with your Orthopedic surgeon. Please make sure to have close follow-up with a primary care doctor within 1-2 weeks after being discharged from the hospital.  .  If you notice worsening symptoms such as Fevers, chills, night sweat, Vomiting, diarrhea, muscle aches. Please call 911 and come to the hospital.      SECONDARY DISCHARGE DIAGNOSES  Diagnosis: Psoas abscess  Assessment and Plan of Treatment: On imaging you had a fluid collection on your Psoas Muscle near your hip. This is why you had pain in that area. The abscess was treated with the antibiotic Nafcillin. You had pain medication with tylenol. Please make sure to follow-up with your primary care doctor 1-2 weeks after being discharged from the hospital.    Diagnosis: 2019 novel coronavirus disease (COVID-19)  Assessment and Plan of Treatment: WHAT YOU NEED TO KNOW:  COVID-19 is the disease caused by a coronavirus first discovered in December 2019. Coronaviruses generally cause upper respiratory (nose, throat, and lung) infections, such as a cold. The 2019 virus spreads quickly and easily. It can be spread starting 2 to 3 days before symptoms even begin.  .  DISCHARGE INSTRUCTIONS:  Call your local emergency number (911 in the ) if:   •You have trouble breathing or shortness of breath at rest.  •You have chest pain or pressure that lasts longer than 5 minutes.  •You become confused or hard to wake.  •Your lips or face are blue.  .  Seek care immediately if:   •You have a fever of 104°F (40°C) or higher.  .  You contracted COVID-19 infection. You did not require oxygen supplementation or medications. We treated your cough with cough medicine. Overall, your infection improved on its own with rest and hydration. Please make sure to follow-up with your primary care doctor 1-2 weeks after you are dischrged from the hospital.

## 2021-12-18 NOTE — DISCHARGE NOTE PROVIDER - CARE PROVIDER_API CALL
Marshall Lara (MD)  [facility]  [address], [address]  [place] [address]  Phone: [phone]  Fax: [phone]  Follow Up Time:

## 2021-12-18 NOTE — DISCHARGE NOTE PROVIDER - NSDCMRMEDTOKEN_GEN_ALL_CORE_FT
acetaminophen 500 mg oral tablet: 1 tab(s) orally every 6 hours  aluminum hydroxide-magnesium hydroxide 200 mg-200 mg/5 mL oral suspension: 30 milliliter(s) orally every 12 hours, As needed, Indigestion  cyclobenzaprine 5 mg oral tablet: 1 tab(s) orally 3 times a day, As needed, Muscle Spasm  Figure 8 Brace: Dx s/p C3-C6 Lami/PSF  LEMUEL 99M  magnesium hydroxide 8% oral suspension: 30 milliliter(s) orally every 12 hours, As needed, Constipation  nafcillin: 2 gram(s) intravenous every 4 hours  pantoprazole 40 mg oral delayed release tablet: 1 tab(s) orally once a day (before a meal)  senna oral tablet: 2 tab(s) orally once a day (at bedtime)  sodium chloride 0.9% injectable solution: 1000 milliliter(s) intravenously every 10 hours  traMADol 50 mg oral tablet: 2 tab(s) orally every 6 hours, As needed, Severe Pain (7 - 10)  traMADol 50 mg oral tablet: 1 tab(s) orally every 6 hours, As needed, Moderate Pain (4 - 6)   acetaminophen 500 mg oral tablet: 1 tab(s) orally every 6 hours  aluminum hydroxide-magnesium hydroxide 200 mg-200 mg/5 mL oral suspension: 30 milliliter(s) orally every 12 hours, As needed, Indigestion  cyclobenzaprine 5 mg oral tablet: 1 tab(s) orally 3 times a day, As needed, Muscle Spasm  Figure 8 Brace: Dx s/p C3-C6 Lami/PSF  LEMUEL 99M  magnesium hydroxide 8% oral suspension: 30 milliliter(s) orally every 12 hours, As needed, Constipation  nafcillin: 2 gram(s) intravenous every 4 hours  nafcillin 2 g/100 mL intravenous solution: 2 gram(s) intravenous every 4 hours via pump until 2/21/22  pantoprazole 40 mg oral delayed release tablet: 1 tab(s) orally once a day (before a meal)  senna oral tablet: 2 tab(s) orally once a day (at bedtime)  sodium chloride 0.9% injectable solution: 1000 milliliter(s) intravenously every 10 hours  traMADol 50 mg oral tablet: 2 tab(s) orally every 6 hours, As needed, Severe Pain (7 - 10)  traMADol 50 mg oral tablet: 1 tab(s) orally every 6 hours, As needed, Moderate Pain (4 - 6)   benzonatate 100 mg oral capsule: 1 cap(s) orally every 8 hours, As Needed -for cough   nafcillin 2 g/100 mL intravenous solution: 2 gram(s) intravenous every 4 hours via pump until 2/21/22  pantoprazole 40 mg oral delayed release tablet: 1 tab(s) orally once a day (before a meal)  senna oral tablet: 2 tab(s) orally once a day (at bedtime)

## 2021-12-18 NOTE — DISCHARGE NOTE PROVIDER - HOSPITAL COURSE
HPI:    Patient is a 65 y/o M w/ PMH of hypertension and pre-diabetes who presented for worsening back pain, found to have cervical and lumbar osteomyelitis.    On Nov 15 (almost 1 month prior to admission), upon waking up in the morning, he felt the back pain and leg weakness that he couldn't walk. He denied any trauma or injury to the area. He felt the severe pain all over his spine from his neck down to his lumbar area and shooting pain toward his legs. He never had such pain episodes like this before. He denied bladder/ bowel incontinence. He endorsed increased urinary frequency. No N/V/D, fever, chills, chest pain, abdominal pain, recent surgery or sick contacts.       BCx were positive for MSSA Bacteremia. MRI significant for cervical and lumbar osteomyelitis w/ developing phelgmon/abscess formation. ID consulted, initially patient was on Vanc/Zosyn, however patient was de-escalated to Nafcillin given MSSA bacteremia. Orthopedics was consulted. Patient underwent C3-6 laminectomy w/ PSF at [facility] w/ Dr. Lara. No reported complications. Patient returned to [facility] for further management.     Hospital Course:    ID reconsulted, patient will need weekly ESR/ CRP for monitoring of osteomyelitis, w/ total 8 weeks of Abx. Per ortho, no plan for surgery of lumbar spine's osteomyelitis, tx to be completed w/ abx. Follow up BCx (12/12) were negative.    Patient's post-operative course was unremarkable. Pain was well controlled on PRN tramadol. Patient had regular BMs, and DVT ppx was achieved w/ SCDs (chem ppx contraindicated 2/2 recent spine surgery). Patient's neck stabilized on C collar.   PT/OT/PMR evaluated the patient - recommended acute rehab. Patient unfortunately does not have any insurance, and currently pending dispo planning to rehab.      HPI:    Patient is a 67 y/o M w/ PMH of hypertension and pre-diabetes who presented for worsening back pain, found to have cervical and lumbar osteomyelitis.    On Nov 15 (almost 1 month prior to admission), upon waking up in the morning, he felt the back pain and leg weakness that he couldn't walk. He denied any trauma or injury to the area. He felt the severe pain all over his spine from his neck down to his lumbar area and shooting pain toward his legs. He never had such pain episodes like this before. He denied bladder/ bowel incontinence. He endorsed increased urinary frequency. No N/V/D, fever, chills, chest pain, abdominal pain, recent surgery or sick contacts.       BCx were positive for MSSA Bacteremia. MRI significant for cervical and lumbar osteomyelitis w/ developing phelgmon/abscess formation. ID consulted, initially patient was on Vanc/Zosyn, however patient was de-escalated to Nafcillin given MSSA bacteremia. Orthopedics was consulted. Patient underwent C3-6 laminectomy w/ PSF at [facility] w/ Dr. Lara. No reported complications. Patient returned to [facility] for further management.     Hospital Course:    ID reconsulted, patient will need weekly ESR/ CRP for monitoring of osteomyelitis, w/ total 8 weeks of Abx. Per ortho, no plan for surgery of lumbar spine's osteomyelitis, tx to be completed w/ abx. Follow up BCx (12/12) were negative.    Patient's post-operative course was unremarkable. Pain was well controlled on PRN tramadol. Patient had regular BMs, and DVT ppx was achieved w/ SCDs (chem ppx contraindicated 2/2 recent spine surgery). Patient's neck stabilized on C collar.   PT/OT/PMR evaluated the patient - recommended acute rehab. Patient unfortunately does not have any insurance, and currently pending dispo planning to rehab.   patient is COVID + with a dry cough, no SOB or Oxygen requirements.      HPI:    Patient is a 67 y/o M w/ PMH of hypertension and pre-diabetes who presented for worsening back pain, found to have cervical and lumbar osteomyelitis.    On Nov 15 (almost 1 month prior to admission), upon waking up in the morning, he felt the back pain and leg weakness that he couldn't walk. He denied any trauma or injury to the area. He felt the severe pain all over his spine from his neck down to his lumbar area and shooting pain toward his legs. He never had such pain episodes like this before. He denied bladder/ bowel incontinence. He endorsed increased urinary frequency. No N/V/D, fever, chills, chest pain, abdominal pain, recent surgery or sick contacts.   BCx were positive for MSSA Bacteremia. MRI significant for cervical and lumbar osteomyelitis w/ developing phelgmon/abscess formation. ID consulted, initially patient was on Vanc/Zosyn, however patient was de-escalated to Nafcillin given MSSA bacteremia. Orthopedics was consulted. Patient underwent C3-6 laminectomy w/ PSF at [facility] w/ Dr. Lara. No reported complications. Patient returned to [facility] for further management.       Hospital Course:    ID reconsulted, patient will need weekly ESR/ CRP for monitoring of osteomyelitis, w/ total 8 weeks of Abx. Per ortho, no plan for surgery of lumbar spine's osteomyelitis, tx to be completed w/ abx. Follow up BCx (12/12) were negative. The patient also had a L psoas abcess which was covered with Nafcillin, repeat CT A/P showed decreased size of abscess. Patient had minimal Tylenol for pain, took OTC cough medicine for cough.  Patient's post-operative course was unremarkable. Pain was well controlled on PRN tramadol. Patient had regular BMs, and DVT ppx was achieved w/ SCDs (chem ppx contraindicated 2/2 recent spine surgery). Patient's neck stabilized on C collar, ortho reccomended to leave C-collar until 1/11/22 (4 weeks post-op)  PMR re-evaluated patient and patient is recc to go home w no PT.  Patient tested COVID + in the hospital with Cough, no oxygen requirements or need for remdemsivir/dexamethasone   Patient to be discharged on 1/3 with PICC line with Nafcillin 2g q4 (12/14/21 - 2/21/22) to Home. Patient will need Ortho, and PCP follow-up     HPI:    Patient is a 65 y/o M w/ PMH of hypertension and pre-diabetes who presented for worsening back pain, found to have cervical and lumbar osteomyelitis.    On Nov 15 (almost 1 month prior to admission), upon waking up in the morning, he felt the back pain and leg weakness that he couldn't walk. He denied any trauma or injury to the area. He felt the severe pain all over his spine from his neck down to his lumbar area and shooting pain toward his legs. He never had such pain episodes like this before. He denied bladder/ bowel incontinence. He endorsed increased urinary frequency. No N/V/D, fever, chills, chest pain, abdominal pain, recent surgery or sick contacts.   BCx were positive for MSSA Bacteremia. MRI significant for cervical and lumbar osteomyelitis w/ developing phelgmon/abscess formation. ID consulted, initially patient was on Vanc/Zosyn, however patient was de-escalated to Nafcillin given MSSA bacteremia. Orthopedics was consulted. Patient underwent C3-6 laminectomy w/ PSF at [facility] w/ Dr. Lara. No reported complications. Patient returned to [facility] for further management.       Hospital Course:    ID reconsulted, patient will need weekly ESR/ CRP for monitoring of osteomyelitis, w/ total 8 weeks of Abx. Per ortho, no plan for surgery of lumbar spine's osteomyelitis, tx to be completed w/ abx. Follow up BCx (12/12) were negative. The patient also had a L psoas abcess which was covered with Nafcillin, repeat CT A/P showed decreased size of abscess. Patient had minimal Tylenol for pain, took OTC cough medicine for cough.  Patient's post-operative course was unremarkable. Pain was well controlled on PRN tramadol. Patient had regular BMs, and DVT ppx was achieved w/ SCDs (chem ppx contraindicated 2/2 recent spine surgery). Patient's neck stabilized on C collar, ortho reccomended to leave C-collar until 1/11/22 (4 weeks post-op).  PMR re-evaluated patient and patient is recc to go home w no PT.  Patient tested COVID + in the hospital with Cough, no oxygen requirements or need for remdemsivir/dexamethasone   Patient to be discharged on 1/5 with PICC line with Nafcillin 2g q4 (12/14/21 - 2/21/22) to Home. Patient will need Ortho, and PCP follow-up.     HPI:    Patient is a 65 y/o M w/ PMH of hypertension and pre-diabetes who presented for worsening back pain, found to have cervical and lumbar osteomyelitis.    On Nov 15 (almost 1 month prior to admission), upon waking up in the morning, he felt the back pain and leg weakness that he couldn't walk. He denied any trauma or injury to the area. He felt the severe pain all over his spine from his neck down to his lumbar area and shooting pain toward his legs. He never had such pain episodes like this before. He denied bladder/ bowel incontinence. He endorsed increased urinary frequency. No N/V/D, fever, chills, chest pain, abdominal pain, recent surgery or sick contacts.   BCx were positive for MSSA Bacteremia. MRI significant for cervical and lumbar osteomyelitis w/ developing phelgmon/abscess formation. ID consulted, initially patient was on Vanc/Zosyn, however patient was de-escalated to Nafcillin given MSSA bacteremia. Orthopedics was consulted. Patient underwent C3-6 laminectomy w/ PSF at [facility] w/ Dr. Lara. No reported complications. Patient returned to [facility] for further management.       Hospital Course:    ID reconsulted, patient will need weekly ESR/ CRP for monitoring of osteomyelitis, w/ total 8 weeks of Abx. Per ortho, no plan for surgery of lumbar spine's osteomyelitis, tx to be completed w/ abx. Follow up BCx (12/12) were negative. The patient also had a L psoas abcess which was covered with Nafcillin, repeat CT A/P showed decreased size of abscess. Patient had minimal Tylenol for pain, took OTC cough medicine for cough.  Patient's post-operative course was unremarkable. Pain was well controlled on PRN tramadol. Patient had regular BMs, and DVT ppx was achieved w/ SCDs (chem ppx contraindicated 2/2 recent spine surgery). Patient's neck stabilized on C collar, ortho reccomended to leave C-collar until 1/11/22 (4 weeks post-op).  PMR re-evaluated patient and patient is recc to go home w no PT.  Patient tested COVID + in the hospital with Cough, no oxygen requirements or need for remdemsivir/dexamethasone. Patient c/o hemoptysis on 1/3. CBC, coags unremarkable. CXR _______.  Patient to be discharged on 1/5 with PICC line with Nafcillin 2g q4 (12/14/21 - 2/21/22) to Home. Patient will need Ortho, and PCP follow-up.     HPI:    Patient is a 65 y/o M w/ PMH of hypertension and pre-diabetes who presented for worsening back pain, found to have cervical and lumbar osteomyelitis.    On Nov 15 (almost 1 month prior to admission), upon waking up in the morning, he felt the back pain and leg weakness that he couldn't walk. He denied any trauma or injury to the area. He felt the severe pain all over his spine from his neck down to his lumbar area and shooting pain toward his legs. He never had such pain episodes like this before. He denied bladder/ bowel incontinence. He endorsed increased urinary frequency. No N/V/D, fever, chills, chest pain, abdominal pain, recent surgery or sick contacts.   BCx were positive for MSSA Bacteremia. MRI significant for cervical and lumbar osteomyelitis w/ developing phelgmon/abscess formation. ID consulted, initially patient was on Vanc/Zosyn, however patient was de-escalated to Nafcillin given MSSA bacteremia. Orthopedics was consulted. Patient underwent C3-6 laminectomy w/ PSF at [facility] w/ Dr. Lara. No reported complications. Patient returned to [facility] for further management.       Hospital Course:    ID reconsulted, patient will need weekly ESR/ CRP for monitoring of osteomyelitis, w/ total 8 weeks of Abx. Per ortho, no plan for surgery of lumbar spine's osteomyelitis, tx to be completed w/ abx. Follow up BCx (12/12) were negative. The patient also had a L psoas abcess which was covered with Nafcillin, repeat CT A/P showed decreased size of abscess. Patient had minimal Tylenol for pain, took OTC cough medicine for cough.  Patient's post-operative course was unremarkable. Pain was well controlled on PRN tramadol. Patient had regular BMs, and DVT ppx was achieved w/ SCDs (chem ppx contraindicated 2/2 recent spine surgery). Patient's neck stabilized on C collar, ortho reccomended to leave C-collar until 1/11/22 (4 weeks post-op).  PMR re-evaluated patient and patient is recc to go home w no PT.  Patient tested COVID + in the hospital with Cough, no oxygen requirements or need for remdemsivir/dexamethasone. Patient c/o hemoptysis on 1/3. CBC, coags unremarkable. CXR unremarkable.  Patient to be discharged on 1/5 with PICC line with Nafcillin 2g q4 (12/14/21 - 2/21/22) to Home. Patient will need Ortho, and PCP follow-up.     HPI:    Patient is a 65 y/o M w/ PMH of hypertension and pre-diabetes who presented for worsening back pain, found to have cervical and lumbar osteomyelitis.    On Nov 15 (almost 1 month prior to admission), upon waking up in the morning, he felt the back pain and leg weakness that he couldn't walk. He denied any trauma or injury to the area. He felt the severe pain all over his spine from his neck down to his lumbar area and shooting pain toward his legs. He never had such pain episodes like this before. He denied bladder/ bowel incontinence. He endorsed increased urinary frequency. No N/V/D, fever, chills, chest pain, abdominal pain, recent surgery or sick contacts.   BCx were positive for MSSA Bacteremia. MRI significant for cervical and lumbar osteomyelitis w/ developing phelgmon/abscess formation. ID consulted, initially patient was on Vanc/Zosyn, however patient was de-escalated to Nafcillin given MSSA bacteremia. Orthopedics was consulted. Patient underwent C3-6 laminectomy w/ PSF at [facility] w/ Dr. Lara. No reported complications. Patient returned to [facility] for further management.       Hospital Course:    ID reconsulted, patient will need weekly ESR/ CRP for monitoring of osteomyelitis, w/ total 8 weeks of Abx. Per ortho, no plan for surgery of lumbar spine's osteomyelitis, tx to be completed w/ abx. Follow up BCx (12/12) were negative. The patient also had a L psoas abcess which was covered with Nafcillin, repeat CT A/P showed decreased size of abscess. Patient had minimal Tylenol for pain, took OTC cough medicine for cough.  Patient's post-operative course was unremarkable. Pain was well controlled on PRN tramadol. Patient had regular BMs, and DVT ppx was achieved w/ SCDs (chem ppx contraindicated 2/2 recent spine surgery). Patient's neck stabilized on C collar, ortho reccomended to leave C-collar until 1/11/22 (4 weeks post-op).  PMR re-evaluated patient and patient is recc to go home w no PT.  Patient tested COVID + in the hospital with Cough, no oxygen requirements or need for remdemsivir/dexamethasone. Patient c/o hemoptysis on 1/3. CBC, coags unremarkable. CXR unremarkable.  Patient to be discharged on 1/13 with PICC line with Nafcillin 2g q4 (12/14/21 - 2/21/22) to Home. Patient will need Ortho, and PCP follow-up.

## 2021-12-18 NOTE — DISCHARGE NOTE PROVIDER - NSDCCPTREATMENT_GEN_ALL_CORE_FT
PRINCIPAL PROCEDURE  Procedure: CT abdomen  Findings and Treatment: PROCEDURE:  CT of the Abdomen and Pelvis was performed.  Sagittal and coronal reformats were performed.  FINDINGS:  LOWER CHEST: Scattered areas of subsegmental atelectasis.  LIVER: Subcentimeter hypodensity too small to characterize.  BILE DUCTS: Normal caliber.  GALLBLADDER: Contracted.  SPLEEN: Within normal limits.  PANCREAS: Within normal limits.  ADRENALS: Within normal limits.  KIDNEYS/URETERS: No hydronephrosis. Bilateral subcentimeter renal   hypodensities too small to characterize.  BLADDER: Within normal limits.  REPRODUCTIVE ORGANS: Prostate is enlarged.  BOWEL: No bowel obstruction. Appendix is normal.  PERITONEUM: No ascites.  VESSELS: Atherosclerotic changes.  RETROPERITONEUM/LYMPH NODES: No lymphadenopathy.  ABDOMINAL WALL: Left psoas abscess decreased in size measuring 2.0 x 0.7   cm, previously 3.1 x 1.5 cm.  BONES: Degenerative changes. L3-L4 endplate sclerosis and irregularity,   suggesting discitis/osteomyelitis as noted on recent MR lumbar spine   12/12/2021.  IMPRESSION:  Left psoas abscess decreased in size.        SECONDARY PROCEDURE  Procedure: CT cervical spine  Findings and Treatment: NTERPRETATION:  Clinical indication: Preop CT scan the cervical spine.  Multiple axial sections were performed through the cervical spine.   Coronal and sagittal reconstructions were performed as well  Reversal of the normal cervical lordosis is seen.  Disc space narrowing templates for change is osteophytes and endplate   erosions are seen involving the C4-5 disc space. This is compatible with   patient's known discitis/osteomyelitis as demonstrated on the prior MRI   cervical spine performed on December 12, 2021. Low attenuation seen   involving the prevertebral region at this level which could be compatible   with phlegmon though abscess cannot entirely excluded. Please refer to   MRI of the cervical spine for further evaluation.  Disc space narrowing templates for change in mass effect seen involving   the C3-4 C5-6 and C6-7 levels secondary to chronic degenerative changes.   Bilateral hypertrophic facet changes at multiple levels secondary to   chronic degenerative change  Partial fusion is seen involving the anterior aspect of the T2 and T3   vertebral bodies.  IMPRESSION: Disc space narrowing endplates chronic changes and erosions   of the endplates are seen at the C4-5 level which is secondary to   patient's known discitis/osteomyelitis.  Degenerative changes as described above.

## 2021-12-18 NOTE — DISCHARGE NOTE PROVIDER - NSFOLLOWUPCLINICS_GEN_ALL_ED_FT
[facility] - Primary Care  Primary Care  [address] [place] [address]  Phone: [phone]  Fax:      [facility] - Infectious Disease  Infectious Disease  [address] [facility], [place] [address]  Phone: [phone]  Fax:   Follow Up Time: 1 month    [facility] - Primary Care  Primary Care  [address] [place] [address]  Phone: [phone]  Fax:      [facility] - Infectious Disease  Infectious Disease  [address] [facility], [place] [address]  Phone: [phone]  Fax:   Follow Up Time: 1 month    [facility] - Primary Care  Primary Care  [address] [place] [address]  Phone: [phone]  Fax:     [facility] [place]  Internal Medicine  [address] [place] [address]  Phone: [phone]  Fax: [phone]

## 2021-12-18 NOTE — PROGRESS NOTE ADULT - PROBLEM SELECTOR PLAN 1
MRI w/ discitis/ osteomyelitis in C4/C5 with prevertebral/ phlegmon w/ concern for developing abscess extending from C2 to C6 levels and discitis/ osteomyelitis in L3/L4 level and an extensive paravertebral phlegmon across L3-4.  Now s/p C3-C6 laminectomy w/ PSF by ortho spine.   -Continue Nafcillin 2g Q4hrs - patient will need this for 8 weeks per ID from 12/14  - Per ID will need PICC line, as well as weekly ESR/CRP to trend osteomyelitis   - Repeat BCx (12/12) NGTD  - No plans per ortho for lumbar surgery, medical management at this time.   - Ortho to manage drain  - PT, PMR, OT consulted, appreciating recs  - Activity as tolerated.

## 2021-12-18 NOTE — PROGRESS NOTE ADULT - PROBLEM SELECTOR PLAN 6
Diet: Consistent Carb  DVT: SCDs - chemical prophylaxis contraindicated   Dispo: in patient.  - PT recs rehab  - PMR - recs Acute in patient rehab  - Patient will need sher bed 2/2 lack of insurance.

## 2021-12-18 NOTE — DISCHARGE NOTE PROVIDER - NSFOLLOWUPCLINICSTOKEN_GEN_ALL_ED_FT
[id number]: || ||00\01||False; 361833:1 month|| || ||False;[id number]: || ||00\01||False; 154584:1 month|| || ||False;[id number]: || ||00\01||False;[id number]: || ||00\01||False;

## 2021-12-18 NOTE — PROGRESS NOTE ADULT - ATTENDING COMMENTS
65 yo M with pmh of DM and Hypertension, presented with back pain and found to have discitis/OM now s/p laminectomy and [facility] and transferred back to [facility] for further management. Sepsis, resolved. 2/2 OM + psoas abscess. BCx 12/10 with MSSA and S. epidermidis. No intervention for psoas abscess. S/p laminectomy C3-C6 on 12/14. C/w nafcillin 2g q4 hours. Echo normal but did not exclude endocarditis. ID following. Will need 6 week course of antibiotics. Patient is a dispo issue given no insurance. Dispo pending.     Plan discussed with HS.

## 2021-12-19 DIAGNOSIS — R05.9 COUGH, UNSPECIFIED: ICD-10-CM

## 2021-12-19 PROCEDURE — 99233 SBSQ HOSP IP/OBS HIGH 50: CPT | Mod: GC

## 2021-12-19 RX ORDER — CEFPODOXIME PROXETIL 100 MG
1 TABLET ORAL
Qty: 20 | Refills: 0
Start: 2021-12-19 | End: 2021-12-28

## 2021-12-19 RX ADMIN — Medication 650 MILLIGRAM(S): at 22:40

## 2021-12-19 RX ADMIN — NAFCILLIN 200 GRAM(S): 10 INJECTION, POWDER, FOR SOLUTION INTRAVENOUS at 22:41

## 2021-12-19 RX ADMIN — PANTOPRAZOLE SODIUM 40 MILLIGRAM(S): 20 TABLET, DELAYED RELEASE ORAL at 05:37

## 2021-12-19 RX ADMIN — NAFCILLIN 200 GRAM(S): 10 INJECTION, POWDER, FOR SOLUTION INTRAVENOUS at 06:35

## 2021-12-19 RX ADMIN — NAFCILLIN 200 GRAM(S): 10 INJECTION, POWDER, FOR SOLUTION INTRAVENOUS at 13:43

## 2021-12-19 RX ADMIN — NAFCILLIN 200 GRAM(S): 10 INJECTION, POWDER, FOR SOLUTION INTRAVENOUS at 17:55

## 2021-12-19 RX ADMIN — NAFCILLIN 200 GRAM(S): 10 INJECTION, POWDER, FOR SOLUTION INTRAVENOUS at 11:19

## 2021-12-19 RX ADMIN — NAFCILLIN 200 GRAM(S): 10 INJECTION, POWDER, FOR SOLUTION INTRAVENOUS at 02:26

## 2021-12-19 NOTE — PROGRESS NOTE ADULT - PROBLEM SELECTOR PLAN 4
Patient reports he was diagnosed w/ essential HTN, however takes no meds for this. BP while in patient are WNL  - Will trend BPs and control BP accordingly. Blood Cx (12/10) positive for Staph aureus x2 and Staph Epi x 1 (likely contaminant), howevere repeat Cx (12/12) NGTD. Likely from osteomyelitis w/ abscess formation.  - Abx as above  - TTE w/o vegetation  - Given clearance of BCx, spoke with ID, no need for a GIGI at this time    #Asymptomatic Bacteriuria: - present on admission   - UA + UCx w/ ESBL, however given asymptomatic will not treat, as per ID.

## 2021-12-19 NOTE — PROGRESS NOTE ADULT - ATTENDING COMMENTS
67 yo M with pmh of DM and Hypertension, presented with back pain and found to have discitis/OM now s/p laminectomy and [facility] and transferred back to [facility] for further management. Sepsis, resolved. 2/2 OM + psoas abscess. BCx 12/10 with MSSA and S. epidermidis. No intervention for psoas abscess. S/p laminectomy C3-C6 on 12/14. C/w nafcillin 2g q4 hours. Echo normal but did not exclude endocarditis. ID following. Will need 6 week course of antibiotics. Patient is a dispo issue given no insurance. Dispo pending.     New cough over the last 24 hours. denies SOB or CP. no numbness or tingling but reports groin pain when raises RLE against resistence    of note ESBL in urine asymptomatic    on nafcillin  check RVP r/o covid  awaiting dispo    Plan discussed with HS.    Cass Cid MD

## 2021-12-19 NOTE — PROGRESS NOTE ADULT - SUBJECTIVE AND OBJECTIVE BOX
**********************************************  Emili Mattaed, PGY-1  Internal Medicine   [facility] Pager #: [phone]  **********************************************     Patient is a 66y old  Male who presents with a chief complaint of Cervical and Lumbar Osteomyelitis (18 Dec 2021 12:04)    SUBJECTIVE / OVERNIGHT EVENTS:     OBJECTIVE:  Vital Signs Last 24 Hrs  T(C): 37 (19 Dec 2021 05:35), Max: 37.5 (18 Dec 2021 22:15)  T(F): 98.6 (19 Dec 2021 05:35), Max: 99.5 (18 Dec 2021 22:15)  HR: 64 (19 Dec 2021 05:35) (64 - 84)  BP: 142/81 (19 Dec 2021 05:35) (140/76 - 143/85)  BP(mean): --  RR: 17 (19 Dec 2021 05:35) (17 - 17)  SpO2: 100% (19 Dec 2021 05:35) (100% - 100%)    I&O's Summary    17 Dec 2021 07:01  -  18 Dec 2021 07:00  --------------------------------------------------------  IN: 1920 mL / OUT: 750 mL / NET: 1170 mL    18 Dec 2021 07:01  -  19 Dec 2021 06:43  --------------------------------------------------------  IN: 0 mL / OUT: 650 mL / NET: -650 mL      Physical Examination:      Labs:  CAPILLARY BLOOD GLUCOSE                              12.0   6.57  )-----------( 252      ( 17 Dec 2021 07:45 )             35.1     12-17    133<L>  |  99  |  9   ----------------------------<  96  4.4   |  22  |  0.82    Ca    8.1<L>      17 Dec 2021 07:45  Phos  2.8     12-17  Mg     2.00     12-17                Imaging Personally Reviewed:      MEDICATIONS  (STANDING):  dextrose 5% + lactated ringers. 1000 milliLiter(s) (75 mL/Hr) IV Continuous <Continuous>  nafcillin  IVPB      nafcillin  IVPB 2 Gram(s) IV Intermittent every 4 hours  pantoprazole    Tablet 40 milliGRAM(s) Oral before breakfast  senna 2 Tablet(s) Oral at bedtime    MEDICATIONS  (PRN):  acetaminophen     Tablet .. 650 milliGRAM(s) Oral every 6 hours PRN Temp greater or equal to 38C (100.4F), Mild Pain (1 - 3)  cyclobenzaprine 5 milliGRAM(s) Oral every 8 hours PRN Muscle Spasm  traMADol 50 milliGRAM(s) Oral every 6 hours PRN Moderate Pain (4 - 6)  traMADol 100 milliGRAM(s) Oral every 6 hours PRN Severe Pain (7 - 10)   **********************************************  Emili Mattaed, PGY-1  Internal Medicine   [facility] Pager #: [phone]  **********************************************     Patient is a 66y old  Male who presents with a chief complaint of Cervical and Lumbar Osteomyelitis (18 Dec 2021 12:04)    SUBJECTIVE / OVERNIGHT EVENTS:   -No acute events overnight  -Patient examined at bedside this AM, complaining a new nonproductive cough, which started yesterday  -Denies fever/chills, CP, palpitations, SOB, n/v/d   -Reports well-controlled neck pain     OBJECTIVE:  Vital Signs Last 24 Hrs  T(C): 37 (19 Dec 2021 05:35), Max: 37.5 (18 Dec 2021 22:15)  T(F): 98.6 (19 Dec 2021 05:35), Max: 99.5 (18 Dec 2021 22:15)  HR: 64 (19 Dec 2021 05:35) (64 - 84)  BP: 142/81 (19 Dec 2021 05:35) (140/76 - 143/85)  BP(mean): --  RR: 17 (19 Dec 2021 05:35) (17 - 17)  SpO2: 100% (19 Dec 2021 05:35) (100% - 100%)    I&O's Summary    17 Dec 2021 07:01  -  18 Dec 2021 07:00  --------------------------------------------------------  IN: 1920 mL / OUT: 750 mL / NET: 1170 mL    18 Dec 2021 07:01  -  19 Dec 2021 06:43  --------------------------------------------------------  IN: 0 mL / OUT: 650 mL / NET: -650 mL      Physical Examination:  CONSTITUTIONAL: NAD, well-developed, C collar in place  RESPIRATORY: Normal respiratory effort; lungs are clear to auscultation bilaterally  CARDIOVASCULAR: Regular rate and rhythm, normal S1 and S2, no murmur/rub/gallop; No lower extremity edema; Peripheral pulses are 2+ bilaterally  ABDOMEN: Nontender to palpation, normoactive bowel sounds, no rebound/guarding; No hepatosplenomegaly  MUSCLOSKELETAL: no clubbing or cyanosis of digits; 3/5 strength b/l LE   PSYCH: A+O to person, place, and time; affect appropriate    Labs:  CAPILLARY BLOOD GLUCOSE                              12.0   6.57  )-----------( 252      ( 17 Dec 2021 07:45 )             35.1     12-17    133<L>  |  99  |  9   ----------------------------<  96  4.4   |  22  |  0.82    Ca    8.1<L>      17 Dec 2021 07:45  Phos  2.8     12-17  Mg     2.00     12-17                Imaging Personally Reviewed:      MEDICATIONS  (STANDING):  dextrose 5% + lactated ringers. 1000 milliLiter(s) (75 mL/Hr) IV Continuous <Continuous>  nafcillin  IVPB      nafcillin  IVPB 2 Gram(s) IV Intermittent every 4 hours  pantoprazole    Tablet 40 milliGRAM(s) Oral before breakfast  senna 2 Tablet(s) Oral at bedtime    MEDICATIONS  (PRN):  acetaminophen     Tablet .. 650 milliGRAM(s) Oral every 6 hours PRN Temp greater or equal to 38C (100.4F), Mild Pain (1 - 3)  cyclobenzaprine 5 milliGRAM(s) Oral every 8 hours PRN Muscle Spasm  traMADol 50 milliGRAM(s) Oral every 6 hours PRN Moderate Pain (4 - 6)  traMADol 100 milliGRAM(s) Oral every 6 hours PRN Severe Pain (7 - 10)

## 2021-12-19 NOTE — PROGRESS NOTE ADULT - SUBJECTIVE AND OBJECTIVE BOX
Pt seen/examined. Doing well. Pain controlled. No acute overnight complaints or events.    T(C): 37 (12-19-21 @ 05:35), Max: 37.5 (12-18-21 @ 22:15)  HR: 64 (12-19-21 @ 05:35) (64 - 84)  BP: 142/81 (12-19-21 @ 05:35) (140/76 - 143/85)  RR: 17 (12-19-21 @ 05:35) (17 - 17)  SpO2: 100% (12-19-21 @ 05:35) (100% - 100%)  Wt(kg): --    Neck: Dressing clean/ dry/intact   UE: Deltoids: 5/5         Biceps: 5/5         Triceps: 5/5          Wrist ext: 5/5         : 5/5        A/P : 65 yo M s/p C3-C6 lami and PSF, POD 5  - Stable   - PT-WBAT  - Pain control  - Incentive spirometer  - Dispo planning

## 2021-12-19 NOTE — PROGRESS NOTE ADULT - PROBLEM SELECTOR PLAN 6
Diet: Consistent Carb  DVT: SCDs - chemical prophylaxis contraindicated   Dispo: in patient.  - PT recs rehab  - PMR - recs Acute in patient rehab  - Patient will need sher bed 2/2 lack of insurance. Patient reports he has been told he has diabetes but is not on any medications for it. A1c in patient 6.3.   - No indication for SSI.  - Patient is NOT a diabetic, has never been formally diagnosed with diabetes just self-reported, however will need close outpatient follow up to prevent progression to DM2.

## 2021-12-19 NOTE — PROGRESS NOTE ADULT - PROBLEM SELECTOR PLAN 1
MRI w/ discitis/ osteomyelitis in C4/C5 with prevertebral/ phlegmon w/ concern for developing abscess extending from C2 to C6 levels and discitis/ osteomyelitis in L3/L4 level and an extensive paravertebral phlegmon across L3-4.  Now s/p C3-C6 laminectomy w/ PSF by ortho spine.   -Continue Nafcillin 2g Q4hrs - patient will need this for 8 weeks per ID from 12/14  - Per ID will need PICC line, as well as weekly ESR/CRP to trend osteomyelitis   - Repeat BCx (12/12) NGTD  - No plans per ortho for lumbar surgery, medical management at this time.   - Ortho to manage drain  - PT, PMR, OT consulted, appreciating recs  - Activity as tolerated. Patient with new cough on 12/18, nonproductive. No f/chills.   -In the setting of recent COVID outbreak on floor, will obtain RVP   -Continuing to sat well on RA, monitor closely  -Patient with recent surgery, could be component of atelectasis. Will encourage incentive spirometry.

## 2021-12-19 NOTE — PROGRESS NOTE ADULT - PROBLEM SELECTOR PLAN 3
Blood Cx (12/10) positive for Staph aureus x2 and Staph Epi x 1 (likely contaminant), howevere repeat Cx (12/12) NGTD. Likely from osteomyelitis w/ abscess formation.  - Abx as above  - TTE w/o vegetation  - Given clearance of BCx, spoke with ID, no need for a GIGI at this time    #Asymptomatic Bacteriuria: - present on admission   - UA + UCx w/ E coli, however given asymptomatic will not treat, as per ID. CT abd pelvis w/ L psoas abscess.  MRI showed psoas abscess bilaterally.  Bacteremia with Staph aureus    -IR consulted for possible psoas abscess drainage - evaluated and found abscess to be too small to drain.   -Abx as above  -F/u BCx as above.

## 2021-12-19 NOTE — PROGRESS NOTE ADULT - PROBLEM SELECTOR PLAN 2
CT abd pelvis w/ L psoas abscess.  MRI showed psoas abscess bilaterally.  Bacteremia with Staph aureus    -IR consulted for possible psoas abscess drainage - evaluated and found abscess to be too small to drain.   -Abx as above  -F/u BCx as above. MRI w/ discitis/ osteomyelitis in C4/C5 with prevertebral/ phlegmon w/ concern for developing abscess extending from C2 to C6 levels and discitis/ osteomyelitis in L3/L4 level and an extensive paravertebral phlegmon across L3-4.  Now s/p C3-C6 laminectomy w/ PSF by ortho spine.   -Continue Nafcillin 2g Q4hrs - patient will need this for 8 weeks per ID from 12/14  - Per ID will need PICC line, as well as weekly ESR/CRP to trend osteomyelitis   - Repeat BCx (12/12) NGTD  - No plans per ortho for lumbar surgery, medical management at this time.   - Ortho to manage drain  - PT, PMR, OT consulted, appreciating recs  - Activity as tolerated.

## 2021-12-20 DIAGNOSIS — R50.9 FEVER, UNSPECIFIED: ICD-10-CM

## 2021-12-20 LAB
ALBUMIN SERPL ELPH-MCNC: 2.5 G/DL — LOW (ref 3.3–5)
ALP SERPL-CCNC: 81 U/L — SIGNIFICANT CHANGE UP (ref 40–120)
ALT FLD-CCNC: 25 U/L — SIGNIFICANT CHANGE UP (ref 4–41)
ANION GAP SERPL CALC-SCNC: 12 MMOL/L — SIGNIFICANT CHANGE UP (ref 7–14)
AST SERPL-CCNC: 19 U/L — SIGNIFICANT CHANGE UP (ref 4–40)
B PERT DNA SPEC QL NAA+PROBE: SIGNIFICANT CHANGE UP
B PERT+PARAPERT DNA PNL SPEC NAA+PROBE: SIGNIFICANT CHANGE UP
BILIRUB SERPL-MCNC: 0.4 MG/DL — SIGNIFICANT CHANGE UP (ref 0.2–1.2)
BORDETELLA PARAPERTUSSIS (RAPRVP): SIGNIFICANT CHANGE UP
BUN SERPL-MCNC: 10 MG/DL — SIGNIFICANT CHANGE UP (ref 7–23)
C PNEUM DNA SPEC QL NAA+PROBE: SIGNIFICANT CHANGE UP
CALCIUM SERPL-MCNC: 8.4 MG/DL — SIGNIFICANT CHANGE UP (ref 8.4–10.5)
CHLORIDE SERPL-SCNC: 97 MMOL/L — LOW (ref 98–107)
CO2 SERPL-SCNC: 24 MMOL/L — SIGNIFICANT CHANGE UP (ref 22–31)
CREAT SERPL-MCNC: 0.87 MG/DL — SIGNIFICANT CHANGE UP (ref 0.5–1.3)
FLUAV SUBTYP SPEC NAA+PROBE: SIGNIFICANT CHANGE UP
FLUBV RNA SPEC QL NAA+PROBE: SIGNIFICANT CHANGE UP
GLUCOSE SERPL-MCNC: 109 MG/DL — HIGH (ref 70–99)
HADV DNA SPEC QL NAA+PROBE: SIGNIFICANT CHANGE UP
HCOV 229E RNA SPEC QL NAA+PROBE: SIGNIFICANT CHANGE UP
HCOV HKU1 RNA SPEC QL NAA+PROBE: SIGNIFICANT CHANGE UP
HCOV NL63 RNA SPEC QL NAA+PROBE: SIGNIFICANT CHANGE UP
HCOV OC43 RNA SPEC QL NAA+PROBE: SIGNIFICANT CHANGE UP
HCT VFR BLD CALC: 32 % — LOW (ref 39–50)
HGB BLD-MCNC: 11.5 G/DL — LOW (ref 13–17)
HMPV RNA SPEC QL NAA+PROBE: SIGNIFICANT CHANGE UP
HPIV1 RNA SPEC QL NAA+PROBE: SIGNIFICANT CHANGE UP
HPIV2 RNA SPEC QL NAA+PROBE: SIGNIFICANT CHANGE UP
HPIV3 RNA SPEC QL NAA+PROBE: SIGNIFICANT CHANGE UP
HPIV4 RNA SPEC QL NAA+PROBE: SIGNIFICANT CHANGE UP
M PNEUMO DNA SPEC QL NAA+PROBE: SIGNIFICANT CHANGE UP
MAGNESIUM SERPL-MCNC: 1.8 MG/DL — SIGNIFICANT CHANGE UP (ref 1.6–2.6)
MCHC RBC-ENTMCNC: 31.9 PG — SIGNIFICANT CHANGE UP (ref 27–34)
MCHC RBC-ENTMCNC: 35.9 GM/DL — SIGNIFICANT CHANGE UP (ref 32–36)
MCV RBC AUTO: 88.9 FL — SIGNIFICANT CHANGE UP (ref 80–100)
NRBC # BLD: 0 /100 WBCS — SIGNIFICANT CHANGE UP
NRBC # FLD: 0 K/UL — SIGNIFICANT CHANGE UP
PHOSPHATE SERPL-MCNC: 3.2 MG/DL — SIGNIFICANT CHANGE UP (ref 2.5–4.5)
PLATELET # BLD AUTO: 673 K/UL — HIGH (ref 150–400)
POTASSIUM SERPL-MCNC: 4.2 MMOL/L — SIGNIFICANT CHANGE UP (ref 3.5–5.3)
POTASSIUM SERPL-SCNC: 4.2 MMOL/L — SIGNIFICANT CHANGE UP (ref 3.5–5.3)
PROT SERPL-MCNC: 6.8 G/DL — SIGNIFICANT CHANGE UP (ref 6–8.3)
RAPID RVP RESULT: DETECTED
RBC # BLD: 3.6 M/UL — LOW (ref 4.2–5.8)
RBC # FLD: 15.8 % — HIGH (ref 10.3–14.5)
RSV RNA SPEC QL NAA+PROBE: SIGNIFICANT CHANGE UP
RV+EV RNA SPEC QL NAA+PROBE: DETECTED
SARS-COV-2 RNA SPEC QL NAA+PROBE: DETECTED
SODIUM SERPL-SCNC: 133 MMOL/L — LOW (ref 135–145)
WBC # BLD: 5.62 K/UL — SIGNIFICANT CHANGE UP (ref 3.8–10.5)
WBC # FLD AUTO: 5.62 K/UL — SIGNIFICANT CHANGE UP (ref 3.8–10.5)

## 2021-12-20 PROCEDURE — 99233 SBSQ HOSP IP/OBS HIGH 50: CPT | Mod: GC

## 2021-12-20 PROCEDURE — 99232 SBSQ HOSP IP/OBS MODERATE 35: CPT

## 2021-12-20 PROCEDURE — 99233 SBSQ HOSP IP/OBS HIGH 50: CPT

## 2021-12-20 RX ADMIN — PANTOPRAZOLE SODIUM 40 MILLIGRAM(S): 20 TABLET, DELAYED RELEASE ORAL at 06:41

## 2021-12-20 RX ADMIN — NAFCILLIN 200 GRAM(S): 10 INJECTION, POWDER, FOR SOLUTION INTRAVENOUS at 14:07

## 2021-12-20 RX ADMIN — Medication 650 MILLIGRAM(S): at 16:20

## 2021-12-20 RX ADMIN — NAFCILLIN 200 GRAM(S): 10 INJECTION, POWDER, FOR SOLUTION INTRAVENOUS at 10:03

## 2021-12-20 RX ADMIN — Medication 650 MILLIGRAM(S): at 00:12

## 2021-12-20 RX ADMIN — Medication 650 MILLIGRAM(S): at 17:20

## 2021-12-20 RX ADMIN — NAFCILLIN 200 GRAM(S): 10 INJECTION, POWDER, FOR SOLUTION INTRAVENOUS at 21:52

## 2021-12-20 RX ADMIN — NAFCILLIN 200 GRAM(S): 10 INJECTION, POWDER, FOR SOLUTION INTRAVENOUS at 02:36

## 2021-12-20 RX ADMIN — NAFCILLIN 200 GRAM(S): 10 INJECTION, POWDER, FOR SOLUTION INTRAVENOUS at 06:42

## 2021-12-20 RX ADMIN — NAFCILLIN 200 GRAM(S): 10 INJECTION, POWDER, FOR SOLUTION INTRAVENOUS at 18:28

## 2021-12-20 NOTE — PROGRESS NOTE ADULT - SUBJECTIVE AND OBJECTIVE BOX
Pt seen/examined. Doing well. Pain controlled. No acute overnight complaints or events.    Vital Signs Last 24 Hrs  T(C): 39.2 (19 Dec 2021 22:20), Max: 39.2 (19 Dec 2021 22:20)  T(F): 102.5 (19 Dec 2021 22:20), Max: 102.5 (19 Dec 2021 22:20)  HR: 90 (19 Dec 2021 22:20) (70 - 90)  BP: 150/79 (19 Dec 2021 22:20) (133/73 - 150/79)  BP(mean): --  RR: 17 (19 Dec 2021 22:20) (17 - 18)  SpO2: 100% (19 Dec 2021 22:20) (100% - 100%)      Neck: Dressing clean/ dry/intact   UE: Deltoids: 5/5         Biceps: 5/5         Triceps: 5/5          Wrist ext: 5/5         : 5/5        A/P : 65 yo M s/p C3-C6 lami and PSF, POD 6  - Stable   - PT-WBAT  - Pain control  - Incentive spirometer  - Dispo planning

## 2021-12-20 NOTE — PROGRESS NOTE ADULT - PROBLEM SELECTOR PLAN 4
Blood Cx (12/10) positive for Staph aureus x2 and Staph Epi x 1 (likely contaminant), howevere repeat Cx (12/12) NGTD. Likely from osteomyelitis w/ abscess formation.  - Abx as above  - TTE w/o vegetation  - Given clearance of BCx, spoke with ID, no need for a GIGI at this time    #Asymptomatic Bacteriuria: - present on admission   - UA + UCx w/ ESBL, however given asymptomatic will not treat, as per ID.

## 2021-12-20 NOTE — PROGRESS NOTE ADULT - SUBJECTIVE AND OBJECTIVE BOX
Peter Herbert MD  [phone]/[id number]      PROGRESS NOTE:     Patient is a 66y old  Male who presents with a chief complaint of Cervical and Lumbar Osteomyelitis (20 Dec 2021 06:30)      SUBJECTIVE / OVERNIGHT EVENTS:    No acute events overnight.   Patient examined at bedside        MEDICATIONS  (STANDING):  nafcillin  IVPB      nafcillin  IVPB 2 Gram(s) IV Intermittent every 4 hours  pantoprazole    Tablet 40 milliGRAM(s) Oral before breakfast  senna 2 Tablet(s) Oral at bedtime    MEDICATIONS  (PRN):  acetaminophen     Tablet .. 650 milliGRAM(s) Oral every 6 hours PRN Temp greater or equal to 38C (100.4F), Mild Pain (1 - 3)  cyclobenzaprine 5 milliGRAM(s) Oral every 8 hours PRN Muscle Spasm  traMADol 50 milliGRAM(s) Oral every 6 hours PRN Moderate Pain (4 - 6)  traMADol 100 milliGRAM(s) Oral every 6 hours PRN Severe Pain (7 - 10)      CAPILLARY BLOOD GLUCOSE        I&O's Summary    19 Dec 2021 07:01  -  20 Dec 2021 07:00  --------------------------------------------------------  IN: 840 mL / OUT: 900 mL / NET: -60 mL        PHYSICAL EXAM:  Vital Signs Last 24 Hrs  T(C): 36.7 (20 Dec 2021 06:14), Max: 39.2 (19 Dec 2021 22:20)  T(F): 98 (20 Dec 2021 06:14), Max: 102.5 (19 Dec 2021 22:20)  HR: 70 (20 Dec 2021 06:14) (70 - 90)  BP: 135/60 (20 Dec 2021 06:14) (133/73 - 150/79)  BP(mean): --  RR: 17 (20 Dec 2021 06:14) (17 - 18)  SpO2: 100% (20 Dec 2021 06:14) (100% - 100%)    CONSTITUTIONAL: NAD; well-developed  HEENT: PERRL, clear conjunctiva  RESPIRATORY: Normal respiratory effort; lungs are clear to auscultation bilaterally; No Crackles/Rhonchi/Wheezing  CARDIOVASCULAR: Regular rate and rhythm, normal S1 and S2, no murmur/rub/gallop; No lower extremity edema; Peripheral pulses are 2+ bilaterally  ABDOMEN: Nontender to palpation, normoactive bowel sounds, no rebound/guarding; No hepatosplenomegaly  MUSCLOSKELETAL: no clubbing or cyanosis of digits; no joint swelling or tenderness to palpation  EXTREMITY: Lower extremities Non-tender to palpation; non-erythematous B/L  Neuro: A&Ox3; no focal deficits     LABS:                      RADIOLOGY & ADDITIONAL TESTS:  Results Reviewed:   Imaging Personally Reviewed:  Electrocardiogram Personally Reviewed:    COORDINATION OF CARE:  Care Discussed with Consultants/Other Providers [Y/N]:  Prior or Outpatient Records Reviewed [Y/N]:   Peter Herbert MD  [phone]/[id number]      PROGRESS NOTE:     Patient is a 66y old  Male who presents with a chief complaint of Cervical and Lumbar Osteomyelitis (20 Dec 2021 06:30)      SUBJECTIVE / OVERNIGHT EVENTS:  OVERNIGHT  - fever 102  - Refusing lab draws     Patient was seen and examined at bedside this morning. Patient complaining of new cough w productive clear sputum since Saturday. Spoke to patient about the importance of getting blood draws while in hospital, patient agreed.     Denies any nausea/vomiting/diarrhea, headache, shortness of breath, abdominal pain or chest pain/palpitations. Patient responding appropriately to questions and able to make needs known. Vital signs/imaging reviewed.          MEDICATIONS  (STANDING):  nafcillin  IVPB      nafcillin  IVPB 2 Gram(s) IV Intermittent every 4 hours  pantoprazole    Tablet 40 milliGRAM(s) Oral before breakfast  senna 2 Tablet(s) Oral at bedtime    MEDICATIONS  (PRN):  acetaminophen     Tablet .. 650 milliGRAM(s) Oral every 6 hours PRN Temp greater or equal to 38C (100.4F), Mild Pain (1 - 3)  cyclobenzaprine 5 milliGRAM(s) Oral every 8 hours PRN Muscle Spasm  traMADol 50 milliGRAM(s) Oral every 6 hours PRN Moderate Pain (4 - 6)  traMADol 100 milliGRAM(s) Oral every 6 hours PRN Severe Pain (7 - 10)      CAPILLARY BLOOD GLUCOSE        I&O's Summary    19 Dec 2021 07:01  -  20 Dec 2021 07:00  --------------------------------------------------------  IN: 840 mL / OUT: 900 mL / NET: -60 mL        PHYSICAL EXAM:  Vital Signs Last 24 Hrs  T(C): 36.7 (20 Dec 2021 06:14), Max: 39.2 (19 Dec 2021 22:20)  T(F): 98 (20 Dec 2021 06:14), Max: 102.5 (19 Dec 2021 22:20)  HR: 70 (20 Dec 2021 06:14) (70 - 90)  BP: 135/60 (20 Dec 2021 06:14) (133/73 - 150/79)  BP(mean): --  RR: 17 (20 Dec 2021 06:14) (17 - 18)  SpO2: 100% (20 Dec 2021 06:14) (100% - 100%)    CONSTITUTIONAL: NAD; well-developed; C-collar   HEENT: PERRL, clear conjunctiva  RESPIRATORY: Normal respiratory effort; lungs are clear to auscultation bilaterally; No Crackles/Rhonchi/Wheezing  CARDIOVASCULAR: Regular rate and rhythm, normal S1 and S2, no murmur/rub/gallop; No lower extremity edema; Peripheral pulses are 2+ bilaterally  ABDOMEN: Nontender to palpation, normoactive bowel sounds, no rebound/guarding; No hepatosplenomegaly  MUSCLOSKELETAL: no clubbing or cyanosis of digits; no joint swelling or tenderness to palpation  EXTREMITY: Lower extremities Non-tender to palpation; non-erythematous B/L  Neuro: A&Ox3; no focal deficits  Psych: Normal mood; normal affect        LABS:                        11.5   5.62  )-----------( 673      ( 20 Dec 2021 12:45 )             32.0     12-20    133<L>  |  97<L>  |  10  ----------------------------<  109<H>  4.2   |  24  |  0.87    Ca    8.4      20 Dec 2021 10:11  Phos  3.2     12-20  Mg     1.80     12-20    TPro  6.8  /  Alb  2.5<L>  /  TBili  0.4  /  DBili  x   /  AST  19  /  ALT  25  /  AlkPhos  81  12-20      RADIOLOGY & ADDITIONAL TESTS:  Results Reviewed:   Imaging Personally Reviewed:  Electrocardiogram Personally Reviewed:                  RADIOLOGY & ADDITIONAL TESTS:  Results Reviewed:   Imaging Personally Reviewed:  Electrocardiogram Personally Reviewed:    COORDINATION OF CARE:  Care Discussed with Consultants/Other Providers [Y/N]:  Prior or Outpatient Records Reviewed [Y/N]:

## 2021-12-20 NOTE — PROGRESS NOTE ADULT - ASSESSMENT
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    LE weakness, MSSA bacteremia, cervical and lumbar osteomyelitis, psoas abscess, s/p C3-C6 laminectomy       Piter Vasquez  Attending Physician   Division of Infectious Disease  Pager #[phone]  Available on Microsoft Teams also  After 5pm/weekend or no response, call #[phone]

## 2021-12-20 NOTE — PROGRESS NOTE ADULT - PROBLEM SELECTOR PLAN 1
Patient with new cough on 12/18, nonproductive. No f/chills.   -In the setting of recent COVID outbreak on floor, will obtain RVP   - F/U RVP

## 2021-12-20 NOTE — PROGRESS NOTE ADULT - ATTENDING COMMENTS
67 yo M with pmh of DM and Hypertension, presented with back pain and found to have discitis/OM now s/p laminectomy and [facility] and transferred back to [facility] for further management. Sepsis, resolved. 2/2 OM + psoas abscess. BCx 12/10 with MSSA and S. epidermidis. No intervention for psoas abscess. S/p laminectomy C3-C6 on 12/14. C/w nafcillin 2g q4 hours. Echo normal but did not exclude endocarditis. ID following. Will need 8 week course of antibiotics. febrile o/n now COVID+. not hypoxic. will monitor for now. Patient is a dispo issue given no insurance. Dispo pending.

## 2021-12-20 NOTE — PROGRESS NOTE ADULT - PROBLEM SELECTOR PLAN 2
MRI w/ discitis/ osteomyelitis in C4/C5 with prevertebral/ phlegmon w/ concern for developing abscess extending from C2 to C6 levels and discitis/ osteomyelitis in L3/L4 level and an extensive paravertebral phlegmon across L3-4.  Now s/p C3-C6 laminectomy w/ PSF by ortho spine.   -Continue Nafcillin 2g Q4hrs - patient will need this for 8 weeks per ID from ( 12/14 - )   - Per ID will need PICC line, as well as weekly ESR/CRP to trend osteomyelitis   - Repeat BCx (12/12) NGTD  - No plans per ortho for lumbar surgery, medical management at this time.   - Ortho to manage drain  - Activity as tolerated.  - Incentive spirometer  - F/U Repeat Bcx from 12/20

## 2021-12-20 NOTE — PROGRESS NOTE ADULT - SUBJECTIVE AND OBJECTIVE BOX
Patient is a 66y old  Male who presents with a chief complaint of Cervical and Lumbar Osteomyelitis (20 Dec 2021 07:13)      HPI:  Patient is a 67 y/o M w/ PMH of hypertension and pre-diabetes who presented for worsening back pain, found to have cervical and lumbar osteomyelitis.    On Nov 15 (almost 1 month prior to admission), upon waking up in the morning, he felt the back pain and leg weakness that he couldn't walk. He denied any trauma or injury to the area. He felt the severe pain all over his spine from his neck down to his lumbar area and shooting pain toward his legs. He never had such pain episodes like this before. He denied bladder/ bowel incontinence. He endorsed increased urinary frequency. No N/V/D, fever, chills, chest pain, abdominal pain, recent surgery or sick contacts.       BCx were positive for MSSA Bacteremia. MRI significant for cervical and lumbar osteomyelitis w/ developing phelgmon/abscess formation. ID consulted, initially patient was on Vanc/Zosyn, however patient was de-escalated to Nafcillin given MSSA bacteremia. Orthopedics was consulted. Patient underwent C3-6 laminectomy w/ PSF at [facility] w/ Dr. Lara. No reported complications. Patient returned to [facility] for further management.    (16 Dec 2021 08:00)    collar in place  strength improving  now cg with PT  reports improvement in sensation    REVIEW OF SYSTEMS  Constitutional - No fever, No weight loss, No fatigue  HEENT - No eye pain, No visual disturbances, No difficulty hearing, No tinnitus, No vertigo, No neck pain  Respiratory - No cough, No wheezing, No shortness of breath  Cardiovascular - No chest pain, No palpitations  Gastrointestinal - No abdominal pain, No nausea, No vomiting, No diarrhea, No constipation  Genitourinary - No dysuria, No frequency, No hematuria, No incontinence  Neurological - No headaches, No memory loss, + loss of strength, No numbness, No tremors  Skin - No itching, No rashes, No lesions   Endocrine - No temperature intolerance  Musculoskeletal - No joint pain, No joint swelling, No muscle pain  Psychiatric - No depression, No anxiety    PAST MEDICAL & SURGICAL HISTORY  No pertinent past medical history    Diabetes mellitus    Essential hypertension    Prediabetes    History of hemorrhoidectomy         CURRENT FUNCTIONAL STATUS  12/20   Bed Mobility  Bed Mobility Training Rehab Potential: good, to achieve stated therapy goals  Bed Mobility Training Symptoms Noted During/After Treatment: none  Bed Mobility Training Sit-to-Supine: minimum assist (75% patient effort);  1 person assist;  bed rails  Bed Mobility Training Supine-to-Sit: minimum assist (75% patient effort);  1 person assist;  bed rails  Bed Mobility Training Limitations: decreased ROM;  decreased strength;  impaired balance    Sit-Stand Transfer Training  Sit-to-Stand Transfer Training Rehab Potential: good, to achieve stated therapy goals  Sit-to-Stand Transfer Training Symptoms Noted During/After Treatment: none  Transfer Training Sit-to-Stand Transfer: minimum assist (75% patient effort);  1 person assist;  full weight-bearing   rolling walker  Transfer Training Stand-to-Sit Transfer: minimum assist (75% patient effort);  1 person assist;  full weight-bearing   rolling walker  Sit-to-Stand Transfer Training Transfer Safety Analysis: decreased strength;  impaired balance;  rolling walker    Gait Training  Gait Training Rehab Potential: good, to achieve stated therapy goals  Gait Training Symptoms Noted During/After Treatment: none  Gait Training: minimum assist (75% patient effort);  1 person assist;  full weight-bearing   rolling walker;  50 feet  Gait Analysis: 3-point gait   decreased adelita;  decreased step length;  decreased stride length;  decreased strength;  impaired balance;  50 feet;  rolling walker  Brace/Orthotics Brace/Orthotics: cervical collar    Therapeutic Exercise  Therapeutic Exercise Rehab Effort: good  Therapeutic Exercise Symptoms Noted During/After Treatment: none  Therapeutic Exercise Detail: Active and Active Assist ROM exercises for Bilateral LEs in sitting at edge of bed         12/19 Bed Mobility  Bed Mobility Training Rehab Potential: good, to achieve stated therapy goals  Bed Mobility Training Symptoms Noted During/After Treatment: none  Bed Mobility Training Rolling/Turning: contact guard;  1 person assist  Bed Mobility Training Sit-to-Supine: contact guard;  1 person assist  Bed Mobility Training Supine-to-Sit: contact guard;  1 person assist  Bed Mobility Training Limitations: decreased strength;  pain;  decreased ability to use arms for pushing/pulling    Sit-Stand Transfer Training  Sit-to-Stand Transfer Training Rehab Potential: good, to achieve stated therapy goals  Sit-to-Stand Transfer Training Symptoms Noted During/After Treatment: none  Transfer Training Sit-to-Stand Transfer: contact guard;  1 person assist;  full weight-bearing  Sit-to-Stand Transfer Training Transfer Safety Analysis: decreased strength;  impaired balance    Gait Training  Gait Training Rehab Potential: good, to achieve stated therapy goals  Gait Training Symptoms Noted During/After Treatment: none  Gait Training: contact guard;  1 person assist;  full weight-bearing   rolling walker;  50 feet  Gait Analysis: shuffling;  decreased hip/knee flexion;  decreased step length;  increased stride width  Brace/Orthotics Brace/Orthotics: cervical collar  Gait Number of Times:: x 1    Therapeutic Exercise  Therapeutic Exercise Rehab Effort: good  Therapeutic Exercise Symptoms Noted During/After Treatment: none  Therapeutic Exercise Detail: Patient particiapted in standing exercises to improve strength and balance.           FAMILY HISTORY   No pertinent family history in first degree relatives        RECENT LABS/IMAGING    12-20    133<L>  |  97<L>  |  10  ----------------------------<  109<H>  4.2   |  24  |  0.87    Ca    8.4      20 Dec 2021 10:11  Phos  3.2     12-20  Mg     1.80     12-20    TPro  6.8  /  Alb  2.5<L>  /  TBili  0.4  /  DBili  x   /  AST  19  /  ALT  25  /  AlkPhos  81  12-20        VITALS  T(C): 36.7 (12-20-21 @ 06:14), Max: 39.2 (12-19-21 @ 22:20)  HR: 70 (12-20-21 @ 06:14) (70 - 90)  BP: 135/60 (12-20-21 @ 06:14) (133/73 - 150/79)  RR: 17 (12-20-21 @ 06:14) (17 - 18)  SpO2: 100% (12-20-21 @ 06:14) (100% - 100%)  Wt(kg): --    ALLERGIES  No Known Allergies      MEDICATIONS   acetaminophen     Tablet .. 650 milliGRAM(s) Oral every 6 hours PRN  cyclobenzaprine 5 milliGRAM(s) Oral every 8 hours PRN  nafcillin  IVPB      nafcillin  IVPB 2 Gram(s) IV Intermittent every 4 hours  pantoprazole    Tablet 40 milliGRAM(s) Oral before breakfast  senna 2 Tablet(s) Oral at bedtime  traMADol 50 milliGRAM(s) Oral every 6 hours PRN  traMADol 100 milliGRAM(s) Oral every 6 hours PRN      ----------------------------------------------------------------------------------------  PHYSICAL EXAM  Constitutional - NAD, Comfortable  HEENT -+ collar  Chest - no respiratory distress  Cardiovascular - RRR, S1S2  Abdomen -     NTND  Extremities - No C/C/E, No calf tenderness   Neurologic Exam -                    Cognitive - Awake, Alert, AAO to self, place, date, year, situation     Communication - Fluent, No dysarthria     Cranial Nerves - CN 2-12 intact     Motor -                      LEFT    UE - 4+/5                    RIGHT UE - 4+/5                    LEFT    LE - 4+/5                    RIGHT LE - 4+/5        Sensory - intact to LT b/l     Balance - WNL Static  Psychiatric - Mood stable, Affect WNL  ----------------------------------------------------------------------------------------  ASSESSMENT/PLAN 67 yo m with discitis/ osteomyelitis in C4/C5 with prevertebral/ phlegmon w/ concern for developing abscess extending from C2 to C6 levels and discitis/ osteomyelitis in L3/L4 level and an extensive paravertebral phlegmon across L3-4.  Now s/p C3-C6 laminectomy w/ PSF by ortho spine.   psoas abscess b/l, with staph aureus  TTE w/o vegetation  plan for picc to continue Nafcillin 2mg q 4, to continue for 6 weeks from 12/14  Pain -tramadol  DVT PPX - scd  continue bedside PT and OT  turn and position q 2 to prevent skin breakdown  Rehab - patient cg yesterday with bedside therapy but required min assist today. Strength improving.  currently patient is appropriate for acute rehab when medically cleared. Would likely need sher bed. However strength improving. If ambulation improves during admission, will monitor for progress for goal of discharge home.

## 2021-12-20 NOTE — PROGRESS NOTE ADULT - ASSESSMENT
66M w/o any known PMH presented with worsening back pain w/ associated weakness found to have Staph aureus bacteremia in the setting of a L psoas abscess and cervical and lumbar osteomyelitis w/ developing phelgmon/abscess formation, now s/p C3-C6 laminectomy and PSF. Patient to continue 8 weeks nafcillin for full tx of osteomyelitis. No insurance. F/U with

## 2021-12-20 NOTE — PROGRESS NOTE ADULT - SUBJECTIVE AND OBJECTIVE BOX
ORQUIDEA MCCARTNEY 66y [id number]    Patient is a 66y old  Male who presents with a chief complaint of Cervical and Lumbar Osteomyelitis (20 Dec 2021 12:05)      Follow Up/CC:  ID following for    Interval History/ROS:    Allergies    No Known Allergies    Intolerances        ANTIMICROBIALS:  nafcillin  IVPB    nafcillin  IVPB 2 every 4 hours      MEDICATIONS  (STANDING):  nafcillin  IVPB      nafcillin  IVPB 2 Gram(s) IV Intermittent every 4 hours  pantoprazole    Tablet 40 milliGRAM(s) Oral before breakfast  senna 2 Tablet(s) Oral at bedtime    MEDICATIONS  (PRN):  acetaminophen     Tablet .. 650 milliGRAM(s) Oral every 6 hours PRN Temp greater or equal to 38C (100.4F), Mild Pain (1 - 3)  cyclobenzaprine 5 milliGRAM(s) Oral every 8 hours PRN Muscle Spasm  traMADol 50 milliGRAM(s) Oral every 6 hours PRN Moderate Pain (4 - 6)  traMADol 100 milliGRAM(s) Oral every 6 hours PRN Severe Pain (7 - 10)        Vital Signs Last 24 Hrs  T(C): 36.7 (20 Dec 2021 06:14), Max: 39.2 (19 Dec 2021 22:20)  T(F): 98 (20 Dec 2021 06:14), Max: 102.5 (19 Dec 2021 22:20)  HR: 70 (20 Dec 2021 06:14) (70 - 90)  BP: 135/60 (20 Dec 2021 06:14) (133/73 - 150/79)  BP(mean): --  RR: 17 (20 Dec 2021 06:14) (17 - 18)  SpO2: 100% (20 Dec 2021 06:14) (100% - 100%)      12-20    133<L>  |  97<L>  |  10  ----------------------------<  109<H>  4.2   |  24  |  0.87    Ca    8.4      20 Dec 2021 10:11  Phos  3.2     12-20  Mg     1.80     12-20    TPro  6.8  /  Alb  2.5<L>  /  TBili  0.4  /  DBili  x   /  AST  19  /  ALT  25  /  AlkPhos  81  12-20    LIVER FUNCTIONS - ( 20 Dec 2021 10:11 )  Alb: 2.5 g/dL / Pro: 6.8 g/dL / ALK PHOS: 81 U/L / ALT: 25 U/L / AST: 19 U/L / GGT: x               MICROBIOLOGY:  .Blood Blood-Venous  12-12-21   No Growth Final  --  --      .Blood Blood-Venous  12-12-21   No Growth Final  --  --      Clean Catch Clean Catch (Midstream)  12-10-21   >100,000 CFU/ml Escherichia coli ESBL  --  Escherichia coli ESBL      .Blood Blood-Peripheral  12-10-21   Growth in aerobic bottle: Staphylococcus aureus  See previous culture [id number]  Growth in anaerobic bottle: Staphylococcus epidermidis  Coag Negative Staphylococcus  Single set isolate, possible contaminant. Contact  Microbiology if susceptibility testing clinically  indicated.  --    Growth in aerobic and anaerobic bottles: Gram Positive Cocci in Clusters      .Blood Blood-Venous  12-10-21   Growth in aerobic and anaerobic bottles: Staphylococcus aureus  ***Blood Panel PCR results on this specimen are available  approximately 3 hours after the Gram stain result.***  Gram stain, PCR, and/or culture results may not always  correspond dueto difference in methodologies.  ************************************************************  This PCR assay was performed by multiplex PCR. This  Assay tests for 66 bacterial and resistance gene targets.  Please refer to the [facility] test directory  at [email or web address] for details.  --  Blood Culture PCR  Staphylococcus aureus              v            RADIOLOGY     ORQUIDEA MCCARTNEY 66y [id number]    Patient is a 66y old  Male who presents with a chief complaint of Cervical and Lumbar Osteomyelitis (20 Dec 2021 12:05)      Follow Up/CC:  ID following for bacteremia    Interval History/ROS: fever yesterday, feels ok     Allergies    No Known Allergies    Intolerances        ANTIMICROBIALS:  nafcillin  IVPB    nafcillin  IVPB 2 every 4 hours      MEDICATIONS  (STANDING):  nafcillin  IVPB      nafcillin  IVPB 2 Gram(s) IV Intermittent every 4 hours  pantoprazole    Tablet 40 milliGRAM(s) Oral before breakfast  senna 2 Tablet(s) Oral at bedtime    MEDICATIONS  (PRN):  acetaminophen     Tablet .. 650 milliGRAM(s) Oral every 6 hours PRN Temp greater or equal to 38C (100.4F), Mild Pain (1 - 3)  cyclobenzaprine 5 milliGRAM(s) Oral every 8 hours PRN Muscle Spasm  traMADol 50 milliGRAM(s) Oral every 6 hours PRN Moderate Pain (4 - 6)  traMADol 100 milliGRAM(s) Oral every 6 hours PRN Severe Pain (7 - 10)        Vital Signs Last 24 Hrs  T(C): 36.7 (20 Dec 2021 06:14), Max: 39.2 (19 Dec 2021 22:20)  T(F): 98 (20 Dec 2021 06:14), Max: 102.5 (19 Dec 2021 22:20)  HR: 70 (20 Dec 2021 06:14) (70 - 90)  BP: 135/60 (20 Dec 2021 06:14) (133/73 - 150/79)  BP(mean): --  RR: 17 (20 Dec 2021 06:14) (17 - 18)  SpO2: 100% (20 Dec 2021 06:14) (100% - 100%)      12-20    133<L>  |  97<L>  |  10  ----------------------------<  109<H>  4.2   |  24  |  0.87    Ca    8.4      20 Dec 2021 10:11  Phos  3.2     12-20  Mg     1.80     12-20    TPro  6.8  /  Alb  2.5<L>  /  TBili  0.4  /  DBili  x   /  AST  19  /  ALT  25  /  AlkPhos  81  12-20    LIVER FUNCTIONS - ( 20 Dec 2021 10:11 )  Alb: 2.5 g/dL / Pro: 6.8 g/dL / ALK PHOS: 81 U/L / ALT: 25 U/L / AST: 19 U/L / GGT: x               MICROBIOLOGY:  .Blood Blood-Venous  12-12-21   No Growth Final  --  --      .Blood Blood-Venous  12-12-21   No Growth Final  --  --      Clean Catch Clean Catch (Midstream)  12-10-21   >100,000 CFU/ml Escherichia coli ESBL  --  Escherichia coli ESBL      .Blood Blood-Peripheral  12-10-21   Growth in aerobic bottle: Staphylococcus aureus  See previous culture [id number]  Growth in anaerobic bottle: Staphylococcus epidermidis  Coag Negative Staphylococcus  Single set isolate, possible contaminant. Contact  Microbiology if susceptibility testing clinically  indicated.  --    Growth in aerobic and anaerobic bottles: Gram Positive Cocci in Clusters      .Blood Blood-Venous  12-10-21   Growth in aerobic and anaerobic bottles: Staphylococcus aureus  ***Blood Panel PCR results on this specimen are available  approximately 3 hours after the Gram stain result.***  Gram stain, PCR, and/or culture results may not always  correspond dueto difference in methodologies.  ************************************************************  This PCR assay was performed by multiplex PCR. This  Assay tests for 66 bacterial and resistance gene targets.  Please refer to the [facility] test directory  at [email or web address] for details.  --  Blood Culture PCR  Staphylococcus aureus        RADIOLOGY    < from: CT Cervical Spine No Cont (12.14.21 @ 03:30) >  IMPRESSION: Disc space narrowing endplates chronic changes and erosions   of the endplates are seen at the C4-5 level which is secondary to   patient's known discitis/osteomyelitis.    Degenerative changes as described above.    < end of copied text >

## 2021-12-20 NOTE — PROGRESS NOTE ADULT - PROBLEM SELECTOR PLAN 5
-day 4 of 56 of abx  -picc  -potential side effects of abx explained including GI, Cdiff, allergy issues, development of resisitance, etc.  -potential side effects of PICC explained including bleeding and infection  -weekly cbc, bun/creatinine, esr, crp -day 7 of 56 of abx  -picc  -potential side effects of abx explained including GI, Cdiff, allergy issues, development of resisitance, etc.  -potential side effects of PICC explained including bleeding and infection  -weekly cbc, bun/creatinine, esr, crp

## 2021-12-21 DIAGNOSIS — U07.1 COVID-19: ICD-10-CM

## 2021-12-21 LAB
ALBUMIN SERPL ELPH-MCNC: 2.6 G/DL — LOW (ref 3.3–5)
ALP SERPL-CCNC: 75 U/L — SIGNIFICANT CHANGE UP (ref 40–120)
ALT FLD-CCNC: 21 U/L — SIGNIFICANT CHANGE UP (ref 4–41)
ANION GAP SERPL CALC-SCNC: 11 MMOL/L — SIGNIFICANT CHANGE UP (ref 7–14)
AST SERPL-CCNC: 21 U/L — SIGNIFICANT CHANGE UP (ref 4–40)
BASOPHILS # BLD AUTO: 0.02 K/UL — SIGNIFICANT CHANGE UP (ref 0–0.2)
BASOPHILS NFR BLD AUTO: 0.4 % — SIGNIFICANT CHANGE UP (ref 0–2)
BILIRUB SERPL-MCNC: 0.3 MG/DL — SIGNIFICANT CHANGE UP (ref 0.2–1.2)
BUN SERPL-MCNC: 8 MG/DL — SIGNIFICANT CHANGE UP (ref 7–23)
CALCIUM SERPL-MCNC: 8.2 MG/DL — LOW (ref 8.4–10.5)
CHLORIDE SERPL-SCNC: 97 MMOL/L — LOW (ref 98–107)
CO2 SERPL-SCNC: 25 MMOL/L — SIGNIFICANT CHANGE UP (ref 22–31)
CREAT SERPL-MCNC: 0.91 MG/DL — SIGNIFICANT CHANGE UP (ref 0.5–1.3)
EOSINOPHIL # BLD AUTO: 0.06 K/UL — SIGNIFICANT CHANGE UP (ref 0–0.5)
EOSINOPHIL NFR BLD AUTO: 1.1 % — SIGNIFICANT CHANGE UP (ref 0–6)
GLUCOSE SERPL-MCNC: 87 MG/DL — SIGNIFICANT CHANGE UP (ref 70–99)
HCT VFR BLD CALC: 30.9 % — LOW (ref 39–50)
HGB BLD-MCNC: 10.6 G/DL — LOW (ref 13–17)
IANC: 3.08 K/UL — SIGNIFICANT CHANGE UP (ref 1.5–8.5)
IMM GRANULOCYTES NFR BLD AUTO: 0.4 % — SIGNIFICANT CHANGE UP (ref 0–1.5)
LYMPHOCYTES # BLD AUTO: 1.93 K/UL — SIGNIFICANT CHANGE UP (ref 1–3.3)
LYMPHOCYTES # BLD AUTO: 34.2 % — SIGNIFICANT CHANGE UP (ref 13–44)
MAGNESIUM SERPL-MCNC: 1.8 MG/DL — SIGNIFICANT CHANGE UP (ref 1.6–2.6)
MCHC RBC-ENTMCNC: 30.7 PG — SIGNIFICANT CHANGE UP (ref 27–34)
MCHC RBC-ENTMCNC: 34.3 GM/DL — SIGNIFICANT CHANGE UP (ref 32–36)
MCV RBC AUTO: 89.6 FL — SIGNIFICANT CHANGE UP (ref 80–100)
MONOCYTES # BLD AUTO: 0.54 K/UL — SIGNIFICANT CHANGE UP (ref 0–0.9)
MONOCYTES NFR BLD AUTO: 9.6 % — SIGNIFICANT CHANGE UP (ref 2–14)
NEUTROPHILS # BLD AUTO: 3.08 K/UL — SIGNIFICANT CHANGE UP (ref 1.8–7.4)
NEUTROPHILS NFR BLD AUTO: 54.3 % — SIGNIFICANT CHANGE UP (ref 43–77)
NRBC # BLD: 0 /100 WBCS — SIGNIFICANT CHANGE UP
NRBC # FLD: 0 K/UL — SIGNIFICANT CHANGE UP
PHOSPHATE SERPL-MCNC: 2.9 MG/DL — SIGNIFICANT CHANGE UP (ref 2.5–4.5)
PLATELET # BLD AUTO: 705 K/UL — HIGH (ref 150–400)
POTASSIUM SERPL-MCNC: 3.3 MMOL/L — LOW (ref 3.5–5.3)
POTASSIUM SERPL-SCNC: 3.3 MMOL/L — LOW (ref 3.5–5.3)
PROT SERPL-MCNC: 6.7 G/DL — SIGNIFICANT CHANGE UP (ref 6–8.3)
RBC # BLD: 3.45 M/UL — LOW (ref 4.2–5.8)
RBC # FLD: 15.9 % — HIGH (ref 10.3–14.5)
SODIUM SERPL-SCNC: 133 MMOL/L — LOW (ref 135–145)
WBC # BLD: 5.65 K/UL — SIGNIFICANT CHANGE UP (ref 3.8–10.5)
WBC # FLD AUTO: 5.65 K/UL — SIGNIFICANT CHANGE UP (ref 3.8–10.5)

## 2021-12-21 PROCEDURE — 99233 SBSQ HOSP IP/OBS HIGH 50: CPT

## 2021-12-21 PROCEDURE — 99233 SBSQ HOSP IP/OBS HIGH 50: CPT | Mod: GC

## 2021-12-21 RX ORDER — CHLORHEXIDINE GLUCONATE 213 G/1000ML
1 SOLUTION TOPICAL DAILY
Refills: 0 | Status: DISCONTINUED | OUTPATIENT
Start: 2021-12-21 | End: 2022-01-13

## 2021-12-21 RX ORDER — POTASSIUM CHLORIDE 20 MEQ
40 PACKET (EA) ORAL ONCE
Refills: 0 | Status: COMPLETED | OUTPATIENT
Start: 2021-12-21 | End: 2021-12-21

## 2021-12-21 RX ORDER — SODIUM CHLORIDE 9 MG/ML
250 INJECTION INTRAMUSCULAR; INTRAVENOUS; SUBCUTANEOUS
Refills: 0 | Status: COMPLETED | OUTPATIENT
Start: 2021-12-21 | End: 2021-12-21

## 2021-12-21 RX ADMIN — PANTOPRAZOLE SODIUM 40 MILLIGRAM(S): 20 TABLET, DELAYED RELEASE ORAL at 05:31

## 2021-12-21 RX ADMIN — NAFCILLIN 200 GRAM(S): 10 INJECTION, POWDER, FOR SOLUTION INTRAVENOUS at 09:29

## 2021-12-21 RX ADMIN — CHLORHEXIDINE GLUCONATE 1 APPLICATION(S): 213 SOLUTION TOPICAL at 17:26

## 2021-12-21 RX ADMIN — NAFCILLIN 200 GRAM(S): 10 INJECTION, POWDER, FOR SOLUTION INTRAVENOUS at 21:19

## 2021-12-21 RX ADMIN — NAFCILLIN 200 GRAM(S): 10 INJECTION, POWDER, FOR SOLUTION INTRAVENOUS at 01:39

## 2021-12-21 RX ADMIN — Medication 40 MILLIEQUIVALENT(S): at 09:30

## 2021-12-21 RX ADMIN — NAFCILLIN 200 GRAM(S): 10 INJECTION, POWDER, FOR SOLUTION INTRAVENOUS at 05:30

## 2021-12-21 RX ADMIN — Medication 650 MILLIGRAM(S): at 05:29

## 2021-12-21 RX ADMIN — Medication 650 MILLIGRAM(S): at 18:16

## 2021-12-21 RX ADMIN — Medication 650 MILLIGRAM(S): at 10:00

## 2021-12-21 RX ADMIN — Medication 650 MILLIGRAM(S): at 16:26

## 2021-12-21 RX ADMIN — NAFCILLIN 200 GRAM(S): 10 INJECTION, POWDER, FOR SOLUTION INTRAVENOUS at 13:12

## 2021-12-21 RX ADMIN — SODIUM CHLORIDE 25 MILLILITER(S): 9 INJECTION INTRAMUSCULAR; INTRAVENOUS; SUBCUTANEOUS at 18:20

## 2021-12-21 RX ADMIN — NAFCILLIN 200 GRAM(S): 10 INJECTION, POWDER, FOR SOLUTION INTRAVENOUS at 17:23

## 2021-12-21 NOTE — PROGRESS NOTE ADULT - PROBLEM SELECTOR PLAN 2
MRI w/ discitis/ osteomyelitis in C4/C5 with prevertebral/ phlegmon w/ concern for developing abscess extending from C2 to C6 levels and discitis/ osteomyelitis in L3/L4 level and an extensive paravertebral phlegmon across L3-4.  Now s/p C3-C6 laminectomy w/ PSF by ortho spine.   - Continue Nafcillin 2g Q4hrs - patient will need this for 8 weeks per ID from ( 12/14 - )   - Per ID will need PICC line, as well as weekly ESR/CRP to trend osteomyelitis   - F/U Blood culture 12/20 - Prelim results NGTD   - Activity as tolerated.  - Incentive spirometer MRI w/ discitis/ osteomyelitis in C4/C5 with prevertebral/ phlegmon w/ concern for developing abscess extending from C2 to C6 levels and discitis/ osteomyelitis in L3/L4 level and an extensive paravertebral phlegmon across L3-4.  Now s/p C3-C6 laminectomy w/ PSF by ortho spine.   - Continue Nafcillin 2g Q4hrs - patient will need this for 8 weeks per ID from ( 12/14 - )   - Will need PICC day before discharge  - Will need to be afebrile 24Hours before PICC   - F/U Blood culture 12/20 - Prelim results NGTD   - Activity as tolerated.  - Incentive spirometer

## 2021-12-21 NOTE — PROGRESS NOTE ADULT - PROBLEM SELECTOR PLAN 6
Patient reports he has been told he has diabetes but is not on any medications for it. A1c in patient 6.3.   - No indication for SSI.  - Patient is NOT a diabetic, has never been formally diagnosed with diabetes just self-reported, however will need close outpatient follow up to prevent progression to DM2. Diet: Consistent Carb  DVT: SCDs - chemical prophylaxis contraindicated   Dispo: in patient.  - PT recs rehab  - PMR - recs Acute in patient rehab  - Patient will need sher bed 2/2 lack of insurance.

## 2021-12-21 NOTE — PROGRESS NOTE ADULT - SUBJECTIVE AND OBJECTIVE BOX
ORQUIDEA MCCARTNEY 66y [id number]    Patient is a 66y old  Male who presents with a chief complaint of Cervical and Lumbar Osteomyelitis (21 Dec 2021 07:00)      Follow Up/CC:  ID following for bacteremia    Interval History/ROS: COVID+    Allergies    No Known Allergies    Intolerances        ANTIMICROBIALS:  nafcillin  IVPB    nafcillin  IVPB 2 every 4 hours      MEDICATIONS  (STANDING):  nafcillin  IVPB      nafcillin  IVPB 2 Gram(s) IV Intermittent every 4 hours  pantoprazole    Tablet 40 milliGRAM(s) Oral before breakfast  senna 2 Tablet(s) Oral at bedtime    MEDICATIONS  (PRN):  acetaminophen     Tablet .. 650 milliGRAM(s) Oral every 6 hours PRN Temp greater or equal to 38C (100.4F), Mild Pain (1 - 3)  cyclobenzaprine 5 milliGRAM(s) Oral every 8 hours PRN Muscle Spasm  traMADol 50 milliGRAM(s) Oral every 6 hours PRN Moderate Pain (4 - 6)  traMADol 100 milliGRAM(s) Oral every 6 hours PRN Severe Pain (7 - 10)        Vital Signs Last 24 Hrs  T(C): 37.1 (21 Dec 2021 11:30), Max: 39.1 (20 Dec 2021 16:20)  T(F): 98.8 (21 Dec 2021 11:30), Max: 102.3 (20 Dec 2021 16:20)  HR: 80 (21 Dec 2021 11:30) (66 - 80)  BP: 117/69 (21 Dec 2021 11:30) (117/69 - 154/71)  BP(mean): --  RR: 17 (21 Dec 2021 11:30) (16 - 18)  SpO2: 100% (21 Dec 2021 11:30) (100% - 100%)    CBC Full  -  ( 21 Dec 2021 07:52 )  WBC Count : 5.65 K/uL  RBC Count : 3.45 M/uL  Hemoglobin : 10.6 g/dL  Hematocrit : 30.9 %  Platelet Count - Automated : 705 K/uL  Mean Cell Volume : 89.6 fL  Mean Cell Hemoglobin : 30.7 pg  Mean Cell Hemoglobin Concentration : 34.3 gm/dL  Auto Neutrophil # : 3.08 K/uL  Auto Lymphocyte # : 1.93 K/uL  Auto Monocyte # : 0.54 K/uL  Auto Eosinophil # : 0.06 K/uL  Auto Basophil # : 0.02 K/uL  Auto Neutrophil % : 54.3 %  Auto Lymphocyte % : 34.2 %  Auto Monocyte % : 9.6 %  Auto Eosinophil % : 1.1 %  Auto Basophil % : 0.4 %    12-21    133<L>  |  97<L>  |  8   ----------------------------<  87  3.3<L>   |  25  |  0.91    Ca    8.2<L>      21 Dec 2021 07:52  Phos  2.9     12-21  Mg     1.80     12-21    TPro  6.7  /  Alb  2.6<L>  /  TBili  0.3  /  DBili  x   /  AST  21  /  ALT  21  /  AlkPhos  75  12-21    LIVER FUNCTIONS - ( 21 Dec 2021 07:52 )  Alb: 2.6 g/dL / Pro: 6.7 g/dL / ALK PHOS: 75 U/L / ALT: 21 U/L / AST: 21 U/L / GGT: x               MICROBIOLOGY:  .Blood Blood-Peripheral  12-20-21   No growth to date.  --  --      .Blood Blood-Venous  12-12-21   No Growth Final  --  --      .Blood Blood-Venous  12-12-21   No Growth Final  --  --      Clean Catch Clean Catch (Midstream)  12-10-21   >100,000 CFU/ml Escherichia coli ESBL  --  Escherichia coli ESBL      .Blood Blood-Peripheral  12-10-21   Growth in aerobic bottle: Staphylococcus aureus  See previous culture [id number]  Growth in anaerobic bottle: Staphylococcus epidermidis  Coag Negative Staphylococcus  Single set isolate, possible contaminant. Contact  Microbiology if susceptibility testing clinically  indicated.  --    Growth in aerobic and anaerobic bottles: Gram Positive Cocci in Clusters      .Blood Blood-Venous  12-10-21   Growth in aerobic and anaerobic bottles: Staphylococcus aureus  ***Blood Panel PCR results on this specimen are available  approximately 3 hours after the Gram stain result.***  Gram stain, PCR, and/or culture results may not always  correspond dueto difference in methodologies.  ************************************************************  This PCR assay was performed by multiplex PCR. This  Assay tests for 66 bacterial and resistance gene targets.  Please refer to the [facility] test directory  at [email or web address] for details.  --  Blood Culture PCR  Staphylococcus aureus        Rapid RVP Result: Detected (12-20 @ 12:58)        RADIOLOGY    < from: CT Cervical Spine No Cont (12.14.21 @ 03:30) >  IMPRESSION: Disc space narrowing endplates chronic changes and erosions   of the endplates are seen at the C4-5 level which is secondary to   patient's known discitis/osteomyelitis.    Degenerative changes as described above.    < end of copied text >   ORQUIDEA MCCARTNEY 66y [id number]    Patient is a 66y old  Male who presents with a chief complaint of Cervical and Lumbar Osteomyelitis (21 Dec 2021 07:00)      Follow Up/CC:  ID following for bacteremia    Interval History/ROS: COVID+, fever    Allergies    No Known Allergies    Intolerances        ANTIMICROBIALS:  nafcillin  IVPB    nafcillin  IVPB 2 every 4 hours      MEDICATIONS  (STANDING):  nafcillin  IVPB      nafcillin  IVPB 2 Gram(s) IV Intermittent every 4 hours  pantoprazole    Tablet 40 milliGRAM(s) Oral before breakfast  senna 2 Tablet(s) Oral at bedtime    MEDICATIONS  (PRN):  acetaminophen     Tablet .. 650 milliGRAM(s) Oral every 6 hours PRN Temp greater or equal to 38C (100.4F), Mild Pain (1 - 3)  cyclobenzaprine 5 milliGRAM(s) Oral every 8 hours PRN Muscle Spasm  traMADol 50 milliGRAM(s) Oral every 6 hours PRN Moderate Pain (4 - 6)  traMADol 100 milliGRAM(s) Oral every 6 hours PRN Severe Pain (7 - 10)        Vital Signs Last 24 Hrs  T(C): 37.1 (21 Dec 2021 11:30), Max: 39.1 (20 Dec 2021 16:20)  T(F): 98.8 (21 Dec 2021 11:30), Max: 102.3 (20 Dec 2021 16:20)  HR: 80 (21 Dec 2021 11:30) (66 - 80)  BP: 117/69 (21 Dec 2021 11:30) (117/69 - 154/71)  BP(mean): --  RR: 17 (21 Dec 2021 11:30) (16 - 18)  SpO2: 100% (21 Dec 2021 11:30) (100% - 100%)    CBC Full  -  ( 21 Dec 2021 07:52 )  WBC Count : 5.65 K/uL  RBC Count : 3.45 M/uL  Hemoglobin : 10.6 g/dL  Hematocrit : 30.9 %  Platelet Count - Automated : 705 K/uL  Mean Cell Volume : 89.6 fL  Mean Cell Hemoglobin : 30.7 pg  Mean Cell Hemoglobin Concentration : 34.3 gm/dL  Auto Neutrophil # : 3.08 K/uL  Auto Lymphocyte # : 1.93 K/uL  Auto Monocyte # : 0.54 K/uL  Auto Eosinophil # : 0.06 K/uL  Auto Basophil # : 0.02 K/uL  Auto Neutrophil % : 54.3 %  Auto Lymphocyte % : 34.2 %  Auto Monocyte % : 9.6 %  Auto Eosinophil % : 1.1 %  Auto Basophil % : 0.4 %    12-21    133<L>  |  97<L>  |  8   ----------------------------<  87  3.3<L>   |  25  |  0.91    Ca    8.2<L>      21 Dec 2021 07:52  Phos  2.9     12-21  Mg     1.80     12-21    TPro  6.7  /  Alb  2.6<L>  /  TBili  0.3  /  DBili  x   /  AST  21  /  ALT  21  /  AlkPhos  75  12-21    LIVER FUNCTIONS - ( 21 Dec 2021 07:52 )  Alb: 2.6 g/dL / Pro: 6.7 g/dL / ALK PHOS: 75 U/L / ALT: 21 U/L / AST: 21 U/L / GGT: x               MICROBIOLOGY:  .Blood Blood-Peripheral  12-20-21   No growth to date.  --  --      .Blood Blood-Venous  12-12-21   No Growth Final  --  --      .Blood Blood-Venous  12-12-21   No Growth Final  --  --      Clean Catch Clean Catch (Midstream)  12-10-21   >100,000 CFU/ml Escherichia coli ESBL  --  Escherichia coli ESBL      .Blood Blood-Peripheral  12-10-21   Growth in aerobic bottle: Staphylococcus aureus  See previous culture [id number]  Growth in anaerobic bottle: Staphylococcus epidermidis  Coag Negative Staphylococcus  Single set isolate, possible contaminant. Contact  Microbiology if susceptibility testing clinically  indicated.  --    Growth in aerobic and anaerobic bottles: Gram Positive Cocci in Clusters      .Blood Blood-Venous  12-10-21   Growth in aerobic and anaerobic bottles: Staphylococcus aureus  ***Blood Panel PCR results on this specimen are available  approximately 3 hours after the Gram stain result.***  Gram stain, PCR, and/or culture results may not always  correspond dueto difference in methodologies.  ************************************************************  This PCR assay was performed by multiplex PCR. This  Assay tests for 66 bacterial and resistance gene targets.  Please refer to the [facility] test directory  at [email or web address] for details.  --  Blood Culture PCR  Staphylococcus aureus        Rapid RVP Result: Detected (12-20 @ 12:58)        RADIOLOGY    < from: CT Cervical Spine No Cont (12.14.21 @ 03:30) >  IMPRESSION: Disc space narrowing endplates chronic changes and erosions   of the endplates are seen at the C4-5 level which is secondary to   patient's known discitis/osteomyelitis.    Degenerative changes as described above.    < end of copied text >

## 2021-12-21 NOTE — PROGRESS NOTE ADULT - PROBLEM SELECTOR PLAN 1
Patient with new cough on 12/18, nonproductive. No f/chills.   -In the setting of recent COVID outbreak on floor, will obtain RVP   - Currently on RA, with a dry cough.   - Continue to monitor

## 2021-12-21 NOTE — PROGRESS NOTE ADULT - ASSESSMENT
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    LE weakness, MSSA bacteremia, cervical and lumbar osteomyelitis, psoas abscess, s/p C3-C6 laminectomy       Piter Vasquez  Attending Physician   Division of Infectious Disease  Pager #[phone]  Available on Microsoft Teams also  After 5pm/weekend or no response, call #[phone]

## 2021-12-21 NOTE — PROGRESS NOTE ADULT - PROBLEM SELECTOR PLAN 5
-day 8 of 56 of abx  -picc  -potential side effects of abx explained including GI, Cdiff, allergy issues, development of resistance, etc.  -potential side effects of PICC explained including bleeding and infection  -weekly cbc, bun/creatinine, esr, crp

## 2021-12-21 NOTE — PROGRESS NOTE ADULT - PROBLEM SELECTOR PLAN 4
Blood Cx (12/10) positive for Staph aureus x2 and Staph Epi x 1 (likely contaminant), howevere repeat Cx (12/12) NGTD. Likely from osteomyelitis w/ abscess formation.  - Abx as above  - TTE w/o vegetation  - Given clearance of BCx, spoke with ID, no need for a GIGI at this time    #Asymptomatic Bacteriuria: - present on admission   - UA + UCx w/ ESBL, however given asymptomatic will not treat, as per ID. Blood Cx (12/10) positive for Staph aureus x2 and Staph Epi x 1 (likely contaminant), howevere repeat Cx (12/12) NGTD. Likely from osteomyelitis w/ abscess formation.  - Abx as above  - TTE w/o vegetation  - Given clearance of BCx, spoke with ID, no need for a GIGI at this time

## 2021-12-21 NOTE — PROGRESS NOTE ADULT - SUBJECTIVE AND OBJECTIVE BOX
Peter Herbert MD  [phone]/[id number]      PROGRESS NOTE:     Patient is a 66y old  Male who presents with a chief complaint of Cervical and Lumbar Osteomyelitis (20 Dec 2021 12:22)      SUBJECTIVE / OVERNIGHT EVENTS:    No acute events overnight.   Patient examined at bedside        MEDICATIONS  (STANDING):  nafcillin  IVPB      nafcillin  IVPB 2 Gram(s) IV Intermittent every 4 hours  pantoprazole    Tablet 40 milliGRAM(s) Oral before breakfast  senna 2 Tablet(s) Oral at bedtime    MEDICATIONS  (PRN):  acetaminophen     Tablet .. 650 milliGRAM(s) Oral every 6 hours PRN Temp greater or equal to 38C (100.4F), Mild Pain (1 - 3)  cyclobenzaprine 5 milliGRAM(s) Oral every 8 hours PRN Muscle Spasm  traMADol 50 milliGRAM(s) Oral every 6 hours PRN Moderate Pain (4 - 6)  traMADol 100 milliGRAM(s) Oral every 6 hours PRN Severe Pain (7 - 10)      CAPILLARY BLOOD GLUCOSE        I&O's Summary    20 Dec 2021 07:01  -  21 Dec 2021 07:00  --------------------------------------------------------  IN: 840 mL / OUT: 1000 mL / NET: -160 mL        PHYSICAL EXAM:  Vital Signs Last 24 Hrs  T(C): 37 (20 Dec 2021 22:21), Max: 39.1 (20 Dec 2021 16:20)  T(F): 98.6 (20 Dec 2021 22:21), Max: 102.3 (20 Dec 2021 16:20)  HR: 66 (20 Dec 2021 22:21) (66 - 78)  BP: 140/82 (20 Dec 2021 22:21) (140/82 - 154/71)  BP(mean): --  RR: 17 (20 Dec 2021 22:21) (17 - 17)  SpO2: 100% (20 Dec 2021 22:21) (100% - 100%)    CONSTITUTIONAL: NAD; well-developed  HEENT: PERRL, clear conjunctiva  RESPIRATORY: Normal respiratory effort; lungs are clear to auscultation bilaterally; No Crackles/Rhonchi/Wheezing  CARDIOVASCULAR: Regular rate and rhythm, normal S1 and S2, no murmur/rub/gallop; No lower extremity edema; Peripheral pulses are 2+ bilaterally  ABDOMEN: Nontender to palpation, normoactive bowel sounds, no rebound/guarding; No hepatosplenomegaly  MUSCLOSKELETAL: no clubbing or cyanosis of digits; no joint swelling or tenderness to palpation  EXTREMITY: Lower extremities Non-tender to palpation; non-erythematous B/L  Neuro: A&Ox3; no focal deficits   Psych: normal mood; Affect appropirate    LABS:                        11.5   5.62  )-----------( 673      ( 20 Dec 2021 12:45 )             32.0     12-20    133<L>  |  97<L>  |  10  ----------------------------<  109<H>  4.2   |  24  |  0.87    Ca    8.4      20 Dec 2021 10:11  Phos  3.2     12-20  Mg     1.80     12-20    TPro  6.8  /  Alb  2.5<L>  /  TBili  0.4  /  DBili  x   /  AST  19  /  ALT  25  /  AlkPhos  81  12-20                RADIOLOGY & ADDITIONAL TESTS:  Results Reviewed:   Imaging Personally Reviewed:  Electrocardiogram Personally Reviewed:    COORDINATION OF CARE:  Care Discussed with Consultants/Other Providers [Y/N]:  Prior or Outpatient Records Reviewed [Y/N]:   Peter Herbert MD  [phone]/[facility] [id number]      PROGRESS NOTE:     Patient is a 66y old  Male who presents with a chief complaint of Cervical and Lumbar Osteomyelitis (20 Dec 2021 12:22)      SUBJECTIVE / OVERNIGHT EVENTS:    No acute events overnight.   Patient examined at bedside, patient on RA not complaining of SOB, fever, or chills. He says he feels comfortable and is eating his meals. He knows he has COVID.         MEDICATIONS  (STANDING):  nafcillin  IVPB      nafcillin  IVPB 2 Gram(s) IV Intermittent every 4 hours  pantoprazole    Tablet 40 milliGRAM(s) Oral before breakfast  senna 2 Tablet(s) Oral at bedtime    MEDICATIONS  (PRN):  acetaminophen     Tablet .. 650 milliGRAM(s) Oral every 6 hours PRN Temp greater or equal to 38C (100.4F), Mild Pain (1 - 3)  cyclobenzaprine 5 milliGRAM(s) Oral every 8 hours PRN Muscle Spasm  traMADol 50 milliGRAM(s) Oral every 6 hours PRN Moderate Pain (4 - 6)  traMADol 100 milliGRAM(s) Oral every 6 hours PRN Severe Pain (7 - 10)      CAPILLARY BLOOD GLUCOSE        I&O's Summary    20 Dec 2021 07:01  -  21 Dec 2021 07:00  --------------------------------------------------------  IN: 840 mL / OUT: 1000 mL / NET: -160 mL        PHYSICAL EXAM:  Vital Signs Last 24 Hrs  T(C): 37 (20 Dec 2021 22:21), Max: 39.1 (20 Dec 2021 16:20)  T(F): 98.6 (20 Dec 2021 22:21), Max: 102.3 (20 Dec 2021 16:20)  HR: 66 (20 Dec 2021 22:21) (66 - 78)  BP: 140/82 (20 Dec 2021 22:21) (140/82 - 154/71)  BP(mean): --  RR: 17 (20 Dec 2021 22:21) (17 - 17)  SpO2: 100% (20 Dec 2021 22:21) (100% - 100%)    CONSTITUTIONAL: NAD; thin-appearing; Dry cough; wearing c collar   HEENT: PERRL, clear conjunctiva  RESPIRATORY: Normal respiratory effort; lungs are clear to auscultation bilaterally; No Crackles/Rhonchi/Wheezing  CARDIOVASCULAR: Regular rate and rhythm, normal S1 and S2, no murmur/rub/gallop; No lower extremity edema; Peripheral pulses are 2+ bilaterally  ABDOMEN: Nontender to palpation, normoactive bowel sounds, no rebound/guarding; No hepatosplenomegaly  MUSCLOSKELETAL: no clubbing or cyanosis of digits; no joint swelling or tenderness to palpation  EXTREMITY: Lower extremities Non-tender to palpation; non-erythematous B/L  Neuro: A&Ox3; no focal deficits   Psych: normal mood; Affect appropirate    LABS:                        11.5   5.62  )-----------( 673      ( 20 Dec 2021 12:45 )             32.0     12-20    133<L>  |  97<L>  |  10  ----------------------------<  109<H>  4.2   |  24  |  0.87    Ca    8.4      20 Dec 2021 10:11  Phos  3.2     12-20  Mg     1.80     12-20    TPro  6.8  /  Alb  2.5<L>  /  TBili  0.4  /  DBili  x   /  AST  19  /  ALT  25  /  AlkPhos  81  12-20                RADIOLOGY & ADDITIONAL TESTS:  Results Reviewed:   Imaging Personally Reviewed:  Electrocardiogram Personally Reviewed:    COORDINATION OF CARE:  Care Discussed with Consultants/Other Providers [Y/N]:  Prior or Outpatient Records Reviewed [Y/N]:

## 2021-12-21 NOTE — PROGRESS NOTE ADULT - ASSESSMENT
66M w/o any known PMH presented with worsening back pain w/ associated weakness found to have Staph aureus bacteremia in the setting of a L psoas abscess and cervical and lumbar osteomyelitis w/ developing phelgmon/abscess formation, now s/p C3-C6 laminectomy and PSF. Patient to continue 8 weeks nafcillin for full tx of osteomyelitis. No insurance. Now COVID +. Patient will need to be afebrile for 24 hours before obtaining PICC line. Preferably will need PICC day prior to being discharged. ID following.

## 2021-12-21 NOTE — PROGRESS NOTE ADULT - ATTENDING COMMENTS
67 yo M with pmh of DM and Hypertension, presented with back pain and found to have discitis/OM now s/p laminectomy and [facility] and transferred back to [facility] for further management. Sepsis, resolved. 2/2 OM + psoas abscess. BCx 12/10 with MSSA and S. epidermidis. No intervention for psoas abscess. S/p laminectomy C3-C6 on 12/14. C/w nafcillin 2g q4 hours. Echo normal but did not exclude endocarditis. ID following. Will need 8 week course of antibiotics.  now COVID+, stilll febrile but not hypoxic. will monitor for now. blood cx 12/20 pending. if afebrile >24hr and last blood cx neg will be ready for PICC- ideally placed the day prior to dc. 67 yo M with pmh of DM and Hypertension, presented with back pain and found to have discitis/OM now s/p laminectomy and [facility] and transferred back to [facility] for further management. Sepsis, resolved. 2/2 OM + psoas abscess. BCx 12/10 with MSSA and S. epidermidis. No intervention for psoas abscess. S/p laminectomy C3-C6 on 12/14. C/w nafcillin 2g q4 hours. Echo normal but did not exclude endocarditis. ID following. Will need 8 week course of antibiotics.  now COVID+, stilll febrile but not hypoxic. received mAb 12/21. blood cx 12/20 pending. if afebrile >24hr and last blood cx neg will be ready for PICC- ideally placed the day prior to dc.

## 2021-12-22 DIAGNOSIS — U07.1 COVID-19: ICD-10-CM

## 2021-12-22 LAB
ALBUMIN SERPL ELPH-MCNC: 2.6 G/DL — LOW (ref 3.3–5)
ALP SERPL-CCNC: 74 U/L — SIGNIFICANT CHANGE UP (ref 40–120)
ALT FLD-CCNC: 16 U/L — SIGNIFICANT CHANGE UP (ref 4–41)
ANION GAP SERPL CALC-SCNC: 15 MMOL/L — HIGH (ref 7–14)
ANISOCYTOSIS BLD QL: SLIGHT — SIGNIFICANT CHANGE UP
AST SERPL-CCNC: 16 U/L — SIGNIFICANT CHANGE UP (ref 4–40)
BASOPHILS # BLD AUTO: 0 K/UL — SIGNIFICANT CHANGE UP (ref 0–0.2)
BASOPHILS NFR BLD AUTO: 0 % — SIGNIFICANT CHANGE UP (ref 0–2)
BILIRUB SERPL-MCNC: 0.2 MG/DL — SIGNIFICANT CHANGE UP (ref 0.2–1.2)
BUN SERPL-MCNC: 12 MG/DL — SIGNIFICANT CHANGE UP (ref 7–23)
CALCIUM SERPL-MCNC: 8.2 MG/DL — LOW (ref 8.4–10.5)
CHLORIDE SERPL-SCNC: 96 MMOL/L — LOW (ref 98–107)
CO2 SERPL-SCNC: 24 MMOL/L — SIGNIFICANT CHANGE UP (ref 22–31)
CREAT SERPL-MCNC: 0.95 MG/DL — SIGNIFICANT CHANGE UP (ref 0.5–1.3)
EOSINOPHIL # BLD AUTO: 0.18 K/UL — SIGNIFICANT CHANGE UP (ref 0–0.5)
EOSINOPHIL NFR BLD AUTO: 2.9 % — SIGNIFICANT CHANGE UP (ref 0–6)
GIANT PLATELETS BLD QL SMEAR: PRESENT — SIGNIFICANT CHANGE UP
GLUCOSE SERPL-MCNC: 88 MG/DL — SIGNIFICANT CHANGE UP (ref 70–99)
HCT VFR BLD CALC: 31.9 % — LOW (ref 39–50)
HGB BLD-MCNC: 10.8 G/DL — LOW (ref 13–17)
IANC: 2.47 K/UL — SIGNIFICANT CHANGE UP (ref 1.5–8.5)
LYMPHOCYTES # BLD AUTO: 0.94 K/UL — LOW (ref 1–3.3)
LYMPHOCYTES # BLD AUTO: 15.4 % — SIGNIFICANT CHANGE UP (ref 13–44)
MACROCYTES BLD QL: SLIGHT — SIGNIFICANT CHANGE UP
MAGNESIUM SERPL-MCNC: 1.9 MG/DL — SIGNIFICANT CHANGE UP (ref 1.6–2.6)
MANUAL SMEAR VERIFICATION: SIGNIFICANT CHANGE UP
MCHC RBC-ENTMCNC: 31 PG — SIGNIFICANT CHANGE UP (ref 27–34)
MCHC RBC-ENTMCNC: 33.9 GM/DL — SIGNIFICANT CHANGE UP (ref 32–36)
MCV RBC AUTO: 91.7 FL — SIGNIFICANT CHANGE UP (ref 80–100)
MONOCYTES # BLD AUTO: 1.06 K/UL — HIGH (ref 0–0.9)
MONOCYTES NFR BLD AUTO: 17.3 % — HIGH (ref 2–14)
MRSA PCR RESULT.: SIGNIFICANT CHANGE UP
NEUTROPHILS # BLD AUTO: 3.76 K/UL — SIGNIFICANT CHANGE UP (ref 1.8–7.4)
NEUTROPHILS NFR BLD AUTO: 59.6 % — SIGNIFICANT CHANGE UP (ref 43–77)
NEUTS BAND # BLD: 1.9 % — SIGNIFICANT CHANGE UP (ref 0–6)
OVALOCYTES BLD QL SMEAR: SLIGHT — SIGNIFICANT CHANGE UP
PHOSPHATE SERPL-MCNC: 2.6 MG/DL — SIGNIFICANT CHANGE UP (ref 2.5–4.5)
PLAT MORPH BLD: NORMAL — SIGNIFICANT CHANGE UP
PLATELET # BLD AUTO: 767 K/UL — HIGH (ref 150–400)
PLATELET COUNT - ESTIMATE: ABNORMAL
POIKILOCYTOSIS BLD QL AUTO: SLIGHT — SIGNIFICANT CHANGE UP
POLYCHROMASIA BLD QL SMEAR: SLIGHT — SIGNIFICANT CHANGE UP
POTASSIUM SERPL-MCNC: 3.4 MMOL/L — LOW (ref 3.5–5.3)
POTASSIUM SERPL-SCNC: 3.4 MMOL/L — LOW (ref 3.5–5.3)
PROT SERPL-MCNC: 6.9 G/DL — SIGNIFICANT CHANGE UP (ref 6–8.3)
RBC # BLD: 3.48 M/UL — LOW (ref 4.2–5.8)
RBC # FLD: 16.1 % — HIGH (ref 10.3–14.5)
RBC BLD AUTO: ABNORMAL
S AUREUS DNA NOSE QL NAA+PROBE: SIGNIFICANT CHANGE UP
SMUDGE CELLS # BLD: PRESENT — SIGNIFICANT CHANGE UP
SODIUM SERPL-SCNC: 135 MMOL/L — SIGNIFICANT CHANGE UP (ref 135–145)
VARIANT LYMPHS # BLD: 2.9 % — SIGNIFICANT CHANGE UP (ref 0–6)
WBC # BLD: 6.12 K/UL — SIGNIFICANT CHANGE UP (ref 3.8–10.5)
WBC # FLD AUTO: 6.12 K/UL — SIGNIFICANT CHANGE UP (ref 3.8–10.5)

## 2021-12-22 PROCEDURE — 99232 SBSQ HOSP IP/OBS MODERATE 35: CPT

## 2021-12-22 PROCEDURE — 99232 SBSQ HOSP IP/OBS MODERATE 35: CPT | Mod: GC

## 2021-12-22 RX ORDER — POTASSIUM CHLORIDE 20 MEQ
40 PACKET (EA) ORAL ONCE
Refills: 0 | Status: COMPLETED | OUTPATIENT
Start: 2021-12-22 | End: 2021-12-22

## 2021-12-22 RX ADMIN — NAFCILLIN 200 GRAM(S): 10 INJECTION, POWDER, FOR SOLUTION INTRAVENOUS at 21:11

## 2021-12-22 RX ADMIN — Medication 650 MILLIGRAM(S): at 05:18

## 2021-12-22 RX ADMIN — NAFCILLIN 200 GRAM(S): 10 INJECTION, POWDER, FOR SOLUTION INTRAVENOUS at 17:27

## 2021-12-22 RX ADMIN — CHLORHEXIDINE GLUCONATE 1 APPLICATION(S): 213 SOLUTION TOPICAL at 13:07

## 2021-12-22 RX ADMIN — NAFCILLIN 200 GRAM(S): 10 INJECTION, POWDER, FOR SOLUTION INTRAVENOUS at 05:18

## 2021-12-22 RX ADMIN — NAFCILLIN 200 GRAM(S): 10 INJECTION, POWDER, FOR SOLUTION INTRAVENOUS at 09:18

## 2021-12-22 RX ADMIN — Medication 40 MILLIEQUIVALENT(S): at 09:08

## 2021-12-22 RX ADMIN — Medication 650 MILLIGRAM(S): at 22:15

## 2021-12-22 RX ADMIN — Medication 650 MILLIGRAM(S): at 21:11

## 2021-12-22 RX ADMIN — PANTOPRAZOLE SODIUM 40 MILLIGRAM(S): 20 TABLET, DELAYED RELEASE ORAL at 05:18

## 2021-12-22 RX ADMIN — NAFCILLIN 200 GRAM(S): 10 INJECTION, POWDER, FOR SOLUTION INTRAVENOUS at 13:09

## 2021-12-22 RX ADMIN — NAFCILLIN 200 GRAM(S): 10 INJECTION, POWDER, FOR SOLUTION INTRAVENOUS at 01:34

## 2021-12-22 RX ADMIN — Medication 650 MILLIGRAM(S): at 06:15

## 2021-12-22 NOTE — PROGRESS NOTE ADULT - ASSESSMENT
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    LE weakness, MSSA bacteremia, cervical and lumbar osteomyelitis, psoas abscess, s/p C3-C6 laminectomy   COVID+ - s/p monoclonal ab infusion 12/21    Piter Vasquez  Attending Physician   Division of Infectious Disease  Pager #[phone]  Available on Microsoft Teams also  After 5pm/weekend or no response, call #[phone]

## 2021-12-22 NOTE — PROGRESS NOTE ADULT - PROBLEM SELECTOR PLAN 5
-day 9 of 56 of abx  -picc  -potential side effects of abx explained including GI, Cdiff, allergy issues, development of resistance, etc.  -potential side effects of PICC explained including bleeding and infection  -weekly cbc, bun/creatinine, esr, crp

## 2021-12-22 NOTE — PROGRESS NOTE ADULT - PROBLEM SELECTOR PLAN 5
Diet: Consistent Carb  DVT: SCDs - chemical prophylaxis contraindicated   Dispo: Home if patient is able to ambulate

## 2021-12-22 NOTE — DIETITIAN INITIAL EVALUATION ADULT. - OTHER INFO
66 year old male w/o any known PMH presented with worsening back pain w/ associated weakness found to have Staph aureus bacteremia in the setting of a L psoas abscess and cervical and lumbar osteomyelitis w/ developing phelgmon/abscess formation, now s/p C3-C6 laminectomy and PSF, Now COVID + per chart.    Patient reports appetite is improving now, noted consuming about 75% of lunch tray per observation. Reports however prior to assessment today not having good appetite for 3 days r/t admitting diagnosis/surgery, noted with some intakes of 0-50% of meals per RN flow sheet. Patient amenable to consume Glucerna (220 kcal, 10 g pro) while appetite is suboptimal. Reports no GI distress or chewing/swallowing difficulties. Has no food allergies. Reports UBW is 133 lbs., but unsure of last time he was that weight. Denies noticing any significant wt. changes PTA. ABW is 128 lbs. (12/15) indicating a -3.8% wt. loss. No edema or pressure injuries noted per RN flow sheet.    Educated patient on importance of small frequent meals, focusing on protein rich foods and calorie dense snacks to ensure nutritional needs are met.

## 2021-12-22 NOTE — DIETITIAN INITIAL EVALUATION ADULT. - PERTINENT LABORATORY DATA
12-22 Na 135 mmol/L Glu 88 mg/dL K+ 3.4 mmol/L<L> Cr 0.95 mg/dL BUN 12 mg/dL Phos 2.6 mg/dL  A1C with Estimated Average Glucose (12.13.21 @ 11:54), A1C with Estimated Average Glucose Result: 6.3

## 2021-12-22 NOTE — PROGRESS NOTE ADULT - ATTENDING COMMENTS
65 yo M with pmh of DM and Hypertension, presented with back pain and found to have discitis/OM now s/p laminectomy and [facility] and transferred back to [facility] for further management. Sepsis, resolved. 2/2 OM + psoas abscess. BCx 12/10 with MSSA and S. epidermidis. No intervention for psoas abscess. S/p laminectomy C3-C6 on 12/14. C/w nafcillin 2g q4 hours. Echo normal but did not exclude endocarditis. ID following. Will need 8 week course of antibiotics.  now COVID+, stilll febrile but not hypoxic. received mAb 12/21. blood cx 12/20 NGTD. pt will need rehab placement but has no insurance. f[facility]u [facility]. will place PICC once afebrile and close to the end of COVID quarantine period

## 2021-12-22 NOTE — DIETITIAN INITIAL EVALUATION ADULT. - WEIGHT FOR BMI (LBS)
----- Message from Angelo Hogan sent at 10/2/2018  3:59 PM CDT -----  Contact: Patient  Rx Refill/Request     Is this a Refill or New Rx:  Refill    Rx Name and Strength: mycophenolate (CELLCEPT) 500 mg Tab and  tacrolimus (PROGRAF) 1 MG Cap    Preferred Pharmacy with phone number: [facility] - [place], [place] [address]  Ph: [phone]     Communication Preference: [phone]    Additional Information: Pt's insurance coverage changed and he would like for someone to discuss options for coverage   128.1

## 2021-12-22 NOTE — PROGRESS NOTE ADULT - SUBJECTIVE AND OBJECTIVE BOX
Peter Herbert MD  [phone]/[facility] [id number]      PROGRESS NOTE:     Patient is a 66y old  Male who presents with a chief complaint of Cervical and Lumbar Osteomyelitis (21 Dec 2021 13:59)      SUBJECTIVE / OVERNIGHT EVENTS:    No acute events overnight.   Patient examined at bedside        MEDICATIONS  (STANDING):  chlorhexidine 2% Cloths 1 Application(s) Topical daily  nafcillin  IVPB      nafcillin  IVPB 2 Gram(s) IV Intermittent every 4 hours  pantoprazole    Tablet 40 milliGRAM(s) Oral before breakfast  senna 2 Tablet(s) Oral at bedtime    MEDICATIONS  (PRN):  acetaminophen     Tablet .. 650 milliGRAM(s) Oral every 6 hours PRN Temp greater or equal to 38C (100.4F), Mild Pain (1 - 3)  cyclobenzaprine 5 milliGRAM(s) Oral every 8 hours PRN Muscle Spasm  traMADol 50 milliGRAM(s) Oral every 6 hours PRN Moderate Pain (4 - 6)  traMADol 100 milliGRAM(s) Oral every 6 hours PRN Severe Pain (7 - 10)      CAPILLARY BLOOD GLUCOSE        I&O's Summary    20 Dec 2021 07:01  -  21 Dec 2021 07:00  --------------------------------------------------------  IN: 1080 mL / OUT: 1900 mL / NET: -820 mL    21 Dec 2021 07:01  -  22 Dec 2021 06:59  --------------------------------------------------------  IN: 1440 mL / OUT: 1300 mL / NET: 140 mL        PHYSICAL EXAM:  Vital Signs Last 24 Hrs  T(C): 37.3 (22 Dec 2021 06:15), Max: 38.5 (21 Dec 2021 16:28)  T(F): 99.2 (22 Dec 2021 06:15), Max: 101.3 (21 Dec 2021 16:28)  HR: 70 (22 Dec 2021 05:15) (66 - 80)  BP: 120/78 (22 Dec 2021 05:15) (112/70 - 124/80)  BP(mean): --  RR: 16 (22 Dec 2021 05:15) (16 - 17)  SpO2: 99% (22 Dec 2021 05:15) (99% - 100%)    CONSTITUTIONAL: NAD; well-developed  HEENT: PERRL, clear conjunctiva  RESPIRATORY: Normal respiratory effort; lungs are clear to auscultation bilaterally; No Crackles/Rhonchi/Wheezing  CARDIOVASCULAR: Regular rate and rhythm, normal S1 and S2, no murmur/rub/gallop; No lower extremity edema; Peripheral pulses are 2+ bilaterally  ABDOMEN: Nontender to palpation, normoactive bowel sounds, no rebound/guarding; No hepatosplenomegaly  MUSCLOSKELETAL: no clubbing or cyanosis of digits; no joint swelling or tenderness to palpation  EXTREMITY: Lower extremities Non-tender to palpation; non-erythematous B/L  Neuro: A&Ox3; no focal deficits   Psych: normal mood; Affect appropirate    LABS:                        10.8   6.12  )-----------( 767      ( 22 Dec 2021 06:44 )             31.9     12-21    133<L>  |  97<L>  |  8   ----------------------------<  87  3.3<L>   |  25  |  0.91    Ca    8.2<L>      21 Dec 2021 07:52  Phos  2.9     12-21  Mg     1.80     12-21    TPro  6.7  /  Alb  2.6<L>  /  TBili  0.3  /  DBili  x   /  AST  21  /  ALT  21  /  AlkPhos  75  12-21              Culture - Blood (collected 20 Dec 2021 14:54)  Source: .Blood Blood-Peripheral  Preliminary Report (21 Dec 2021 15:02):    No growth to date.    Culture - Blood (collected 20 Dec 2021 12:17)  Source: .Blood Blood-Peripheral  Preliminary Report (21 Dec 2021 13:02):    No growth to date.        RADIOLOGY & ADDITIONAL TESTS:  Results Reviewed:   Imaging Personally Reviewed:  Electrocardiogram Personally Reviewed:    COORDINATION OF CARE:  Care Discussed with Consultants/Other Providers [Y/N]:  Prior or Outpatient Records Reviewed [Y/N]:   Peter Herbert MD  [phone]/[facility] [id number]      PROGRESS NOTE:     Patient is a 66y old  Male who presents with a chief complaint of Cervical and Lumbar Osteomyelitis (21 Dec 2021 13:59)      SUBJECTIVE / OVERNIGHT EVENTS:  OVERNIGHT   - Fever 100.6     Patient examined at bedside. Not complaining of SOB, does have dry cough. Not complaining of pain/discomfort. He has normal appetite and is eating his meals.           MEDICATIONS  (STANDING):  chlorhexidine 2% Cloths 1 Application(s) Topical daily  nafcillin  IVPB      nafcillin  IVPB 2 Gram(s) IV Intermittent every 4 hours  pantoprazole    Tablet 40 milliGRAM(s) Oral before breakfast  senna 2 Tablet(s) Oral at bedtime    MEDICATIONS  (PRN):  acetaminophen     Tablet .. 650 milliGRAM(s) Oral every 6 hours PRN Temp greater or equal to 38C (100.4F), Mild Pain (1 - 3)  cyclobenzaprine 5 milliGRAM(s) Oral every 8 hours PRN Muscle Spasm  traMADol 50 milliGRAM(s) Oral every 6 hours PRN Moderate Pain (4 - 6)  traMADol 100 milliGRAM(s) Oral every 6 hours PRN Severe Pain (7 - 10)      CAPILLARY BLOOD GLUCOSE        I&O's Summary    20 Dec 2021 07:01  -  21 Dec 2021 07:00  --------------------------------------------------------  IN: 1080 mL / OUT: 1900 mL / NET: -820 mL    21 Dec 2021 07:01  -  22 Dec 2021 06:59  --------------------------------------------------------  IN: 1440 mL / OUT: 1300 mL / NET: 140 mL        PHYSICAL EXAM:  Vital Signs Last 24 Hrs  T(C): 37.3 (22 Dec 2021 06:15), Max: 38.5 (21 Dec 2021 16:28)  T(F): 99.2 (22 Dec 2021 06:15), Max: 101.3 (21 Dec 2021 16:28)  HR: 70 (22 Dec 2021 05:15) (66 - 80)  BP: 120/78 (22 Dec 2021 05:15) (112/70 - 124/80)  BP(mean): --  RR: 16 (22 Dec 2021 05:15) (16 - 17)  SpO2: 99% (22 Dec 2021 05:15) (99% - 100%)    CONSTITUTIONAL: NAD; Thin appearing; Wearing C-collar   HEENT: PERRL, clear conjunctiva  RESPIRATORY: Normal respiratory effort; lungs are clear to auscultation bilaterally; + Wheezing R   CARDIOVASCULAR: Regular rate and rhythm, normal S1 and S2, no murmur/rub/gallop; No lower extremity edema; Peripheral pulses are 2+ bilaterally  ABDOMEN: Nontender to palpation, normoactive bowel sounds, no rebound/guarding; No hepatosplenomegaly  MUSCLOSKELETAL: no clubbing or cyanosis of digits; no joint swelling or tenderness to palpation  EXTREMITY: Lower extremities Non-tender to palpation; non-erythematous B/L  Neuro: A&Ox3; no focal deficits   Psych: normal mood; Affect Appropriate     LABS:                        10.8   6.12  )-----------( 767      ( 22 Dec 2021 06:44 )             31.9     12-21    133<L>  |  97<L>  |  8   ----------------------------<  87  3.3<L>   |  25  |  0.91    Ca    8.2<L>      21 Dec 2021 07:52  Phos  2.9     12-21  Mg     1.80     12-21    TPro  6.7  /  Alb  2.6<L>  /  TBili  0.3  /  DBili  x   /  AST  21  /  ALT  21  /  AlkPhos  75  12-21              Culture - Blood (collected 20 Dec 2021 14:54)  Source: .Blood Blood-Peripheral  Preliminary Report (21 Dec 2021 15:02):    No growth to date.    Culture - Blood (collected 20 Dec 2021 12:17)  Source: .Blood Blood-Peripheral  Preliminary Report (21 Dec 2021 13:02):    No growth to date.        RADIOLOGY & ADDITIONAL TESTS:  Results Reviewed:   Imaging Personally Reviewed:  Electrocardiogram Personally Reviewed:    COORDINATION OF CARE:  Care Discussed with Consultants/Other Providers [Y/N]:  Prior or Outpatient Records Reviewed [Y/N]:

## 2021-12-22 NOTE — PROGRESS NOTE ADULT - SUBJECTIVE AND OBJECTIVE BOX
ORQUIDEA MCCARTNEY 66y [id number]    Patient is a 66y old  Male who presents with a chief complaint of Cervical and Lumbar Osteomyelitis (22 Dec 2021 06:59)      Follow Up/CC:  ID following for mssa bacteremia    Interval History/ROS: fever+, s/p monoclonal ab infusion 12/21    Allergies    No Known Allergies    Intolerances        ANTIMICROBIALS:  nafcillin  IVPB    nafcillin  IVPB 2 every 4 hours      MEDICATIONS  (STANDING):  chlorhexidine 2% Cloths 1 Application(s) Topical daily  nafcillin  IVPB      nafcillin  IVPB 2 Gram(s) IV Intermittent every 4 hours  pantoprazole    Tablet 40 milliGRAM(s) Oral before breakfast  senna 2 Tablet(s) Oral at bedtime    MEDICATIONS  (PRN):  acetaminophen     Tablet .. 650 milliGRAM(s) Oral every 6 hours PRN Temp greater or equal to 38C (100.4F), Mild Pain (1 - 3)  cyclobenzaprine 5 milliGRAM(s) Oral every 8 hours PRN Muscle Spasm  guaiFENesin Oral Liquid (Sugar-Free) 100 milliGRAM(s) Oral every 6 hours PRN Cough  traMADol 50 milliGRAM(s) Oral every 6 hours PRN Moderate Pain (4 - 6)  traMADol 100 milliGRAM(s) Oral every 6 hours PRN Severe Pain (7 - 10)        Vital Signs Last 24 Hrs  T(C): 37.3 (22 Dec 2021 06:15), Max: 38.5 (21 Dec 2021 16:28)  T(F): 99.2 (22 Dec 2021 06:15), Max: 101.3 (21 Dec 2021 16:28)  HR: 70 (22 Dec 2021 05:15) (70 - 80)  BP: 120/78 (22 Dec 2021 05:15) (112/70 - 124/80)  BP(mean): --  RR: 16 (22 Dec 2021 05:15) (16 - 17)  SpO2: 99% (22 Dec 2021 05:15) (99% - 100%)    CBC Full  -  ( 22 Dec 2021 06:44 )  WBC Count : 6.12 K/uL  RBC Count : 3.48 M/uL  Hemoglobin : 10.8 g/dL  Hematocrit : 31.9 %  Platelet Count - Automated : 767 K/uL  Mean Cell Volume : 91.7 fL  Mean Cell Hemoglobin : 31.0 pg  Mean Cell Hemoglobin Concentration : 33.9 gm/dL  Auto Neutrophil # : 3.76 K/uL  Auto Lymphocyte # : 0.94 K/uL  Auto Monocyte # : 1.06 K/uL  Auto Eosinophil # : 0.18 K/uL  Auto Basophil # : 0.00 K/uL  Auto Neutrophil % : 59.6 %  Auto Lymphocyte % : 15.4 %  Auto Monocyte % : 17.3 %  Auto Eosinophil % : 2.9 %  Auto Basophil % : 0.0 %    12-22    135  |  96<L>  |  12  ----------------------------<  88  3.4<L>   |  24  |  0.95    Ca    8.2<L>      22 Dec 2021 06:44  Phos  2.6     12-22  Mg     1.90     12-22    TPro  6.9  /  Alb  2.6<L>  /  TBili  0.2  /  DBili  x   /  AST  16  /  ALT  16  /  AlkPhos  74  12-22    LIVER FUNCTIONS - ( 22 Dec 2021 06:44 )  Alb: 2.6 g/dL / Pro: 6.9 g/dL / ALK PHOS: 74 U/L / ALT: 16 U/L / AST: 16 U/L / GGT: x               MICROBIOLOGY:  .Blood Blood-Peripheral  12-20-21   No growth to date.  --  --      .Blood Blood-Peripheral  12-20-21   No growth to date.  --  --      .Blood Blood-Venous  12-12-21   No Growth Final  --  --      .Blood Blood-Venous  12-12-21   No Growth Final  --  --      Clean Catch Clean Catch (Midstream)  12-10-21   >100,000 CFU/ml Escherichia coli ESBL  --  Escherichia coli ESBL      .Blood Blood-Peripheral  12-10-21   Growth in aerobic bottle: Staphylococcus aureus  See previous culture [id number]  Growth in anaerobic bottle: Staphylococcus epidermidis  Coag Negative Staphylococcus  Single set isolate, possible contaminant. Contact  Microbiology if susceptibility testing clinically  indicated.  --    Growth in aerobic and anaerobic bottles: Gram Positive Cocci in Clusters      .Blood Blood-Venous  12-10-21   Growth in aerobic and anaerobic bottles: Staphylococcus aureus  ***Blood Panel PCR results on this specimen are available  approximately 3 hours after the Gram stain result.***  Gram stain, PCR, and/or culture results may not always  correspond dueto difference in methodologies.  ************************************************************  This PCR assay was performed by multiplex PCR. This  Assay tests for 66 bacterial and resistance gene targets.  Please refer to the [facility] test directory  at [email or web address] for details.  --  Blood Culture PCR  Staphylococcus aureus      Rapid RVP Result: Detected (12-20 @ 12:58)          RADIOLOGY    < from: CT Cervical Spine No Cont (12.14.21 @ 03:30) >  IMPRESSION: Disc space narrowing endplates chronic changes and erosions   of the endplates are seen at the C4-5 level which is secondary to   patient's known discitis/osteomyelitis.    Degenerative changes as described above.    < end of copied text >

## 2021-12-22 NOTE — PROGRESS NOTE ADULT - PROBLEM SELECTOR PLAN 2
MRI w/ discitis/ osteomyelitis in C4/C5 with prevertebral/ phlegmon w/ concern for developing abscess extending from C2 to C6 levels and discitis/ osteomyelitis in L3/L4 level and an extensive paravertebral phlegmon across L3-4.  Now s/p C3-C6 laminectomy w/ PSF by ortho spine.   - Continue Nafcillin 2g Q4hrs - patient will need this for 8 weeks per ID from ( 12/14 - )   - Will need PICC day before discharge  - Will need to be afebrile 24Hours before PICC   - F/U Blood culture 12/20 - Prelim results NGTD   - Activity as tolerated.  - Incentive spirometer

## 2021-12-22 NOTE — DIETITIAN INITIAL EVALUATION ADULT. - PERTINENT MEDS FT
MEDICATIONS  (STANDING):  chlorhexidine 2% Cloths 1 Application(s) Topical daily  nafcillin  IVPB      nafcillin  IVPB 2 Gram(s) IV Intermittent every 4 hours  pantoprazole    Tablet 40 milliGRAM(s) Oral before breakfast  senna 2 Tablet(s) Oral at bedtime

## 2021-12-22 NOTE — PROGRESS NOTE ADULT - PROBLEM SELECTOR PLAN 1
COVID +, Patient on RA with dry cough. Not experiecing SOB, or CP.  S/P Casirivimab/imdevimab  - On isolation/droplet precautions   - Monitor symptoms   - Cough medicine COVID +, Patient on RA with dry cough. Not experiecing SOB, or CP.  S/P Casirivimab/imdevimab  - On isolation/droplet precautions   - Monitor symptoms   - Cough medicine PRN

## 2021-12-23 LAB
ALBUMIN SERPL ELPH-MCNC: 2.5 G/DL — LOW (ref 3.3–5)
ALP SERPL-CCNC: 74 U/L — SIGNIFICANT CHANGE UP (ref 40–120)
ALT FLD-CCNC: 15 U/L — SIGNIFICANT CHANGE UP (ref 4–41)
ANION GAP SERPL CALC-SCNC: 11 MMOL/L — SIGNIFICANT CHANGE UP (ref 7–14)
AST SERPL-CCNC: 16 U/L — SIGNIFICANT CHANGE UP (ref 4–40)
BASOPHILS # BLD AUTO: 0.01 K/UL — SIGNIFICANT CHANGE UP (ref 0–0.2)
BASOPHILS NFR BLD AUTO: 0.2 % — SIGNIFICANT CHANGE UP (ref 0–2)
BILIRUB SERPL-MCNC: 0.2 MG/DL — SIGNIFICANT CHANGE UP (ref 0.2–1.2)
BUN SERPL-MCNC: 10 MG/DL — SIGNIFICANT CHANGE UP (ref 7–23)
CALCIUM SERPL-MCNC: 8.1 MG/DL — LOW (ref 8.4–10.5)
CHLORIDE SERPL-SCNC: 101 MMOL/L — SIGNIFICANT CHANGE UP (ref 98–107)
CO2 SERPL-SCNC: 22 MMOL/L — SIGNIFICANT CHANGE UP (ref 22–31)
CREAT SERPL-MCNC: 0.75 MG/DL — SIGNIFICANT CHANGE UP (ref 0.5–1.3)
EOSINOPHIL # BLD AUTO: 0.1 K/UL — SIGNIFICANT CHANGE UP (ref 0–0.5)
EOSINOPHIL NFR BLD AUTO: 2.2 % — SIGNIFICANT CHANGE UP (ref 0–6)
GLUCOSE SERPL-MCNC: 133 MG/DL — HIGH (ref 70–99)
HCT VFR BLD CALC: 32.9 % — LOW (ref 39–50)
HGB BLD-MCNC: 11.3 G/DL — LOW (ref 13–17)
IANC: 2.31 K/UL — SIGNIFICANT CHANGE UP (ref 1.5–8.5)
IMM GRANULOCYTES NFR BLD AUTO: 0.4 % — SIGNIFICANT CHANGE UP (ref 0–1.5)
LYMPHOCYTES # BLD AUTO: 1.57 K/UL — SIGNIFICANT CHANGE UP (ref 1–3.3)
LYMPHOCYTES # BLD AUTO: 35.3 % — SIGNIFICANT CHANGE UP (ref 13–44)
MAGNESIUM SERPL-MCNC: 1.9 MG/DL — SIGNIFICANT CHANGE UP (ref 1.6–2.6)
MCHC RBC-ENTMCNC: 30.5 PG — SIGNIFICANT CHANGE UP (ref 27–34)
MCHC RBC-ENTMCNC: 34.3 GM/DL — SIGNIFICANT CHANGE UP (ref 32–36)
MCV RBC AUTO: 88.7 FL — SIGNIFICANT CHANGE UP (ref 80–100)
MONOCYTES # BLD AUTO: 0.44 K/UL — SIGNIFICANT CHANGE UP (ref 0–0.9)
MONOCYTES NFR BLD AUTO: 9.9 % — SIGNIFICANT CHANGE UP (ref 2–14)
MRSA PCR RESULT.: SIGNIFICANT CHANGE UP
NEUTROPHILS # BLD AUTO: 2.31 K/UL — SIGNIFICANT CHANGE UP (ref 1.8–7.4)
NEUTROPHILS NFR BLD AUTO: 52 % — SIGNIFICANT CHANGE UP (ref 43–77)
NRBC # BLD: 0 /100 WBCS — SIGNIFICANT CHANGE UP
NRBC # FLD: 0 K/UL — SIGNIFICANT CHANGE UP
PHOSPHATE SERPL-MCNC: 2.1 MG/DL — LOW (ref 2.5–4.5)
PLATELET # BLD AUTO: 754 K/UL — HIGH (ref 150–400)
POTASSIUM SERPL-MCNC: 3.7 MMOL/L — SIGNIFICANT CHANGE UP (ref 3.5–5.3)
POTASSIUM SERPL-SCNC: 3.7 MMOL/L — SIGNIFICANT CHANGE UP (ref 3.5–5.3)
PROT SERPL-MCNC: 6.5 G/DL — SIGNIFICANT CHANGE UP (ref 6–8.3)
RBC # BLD: 3.71 M/UL — LOW (ref 4.2–5.8)
RBC # FLD: 16.5 % — HIGH (ref 10.3–14.5)
S AUREUS DNA NOSE QL NAA+PROBE: SIGNIFICANT CHANGE UP
SODIUM SERPL-SCNC: 134 MMOL/L — LOW (ref 135–145)
WBC # BLD: 4.45 K/UL — SIGNIFICANT CHANGE UP (ref 3.8–10.5)
WBC # FLD AUTO: 4.45 K/UL — SIGNIFICANT CHANGE UP (ref 3.8–10.5)

## 2021-12-23 PROCEDURE — 99232 SBSQ HOSP IP/OBS MODERATE 35: CPT | Mod: GC

## 2021-12-23 PROCEDURE — 99232 SBSQ HOSP IP/OBS MODERATE 35: CPT

## 2021-12-23 RX ORDER — POTASSIUM PHOSPHATE, MONOBASIC POTASSIUM PHOSPHATE, DIBASIC 236; 224 MG/ML; MG/ML
15 INJECTION, SOLUTION INTRAVENOUS ONCE
Refills: 0 | Status: COMPLETED | OUTPATIENT
Start: 2021-12-23 | End: 2021-12-23

## 2021-12-23 RX ADMIN — Medication 650 MILLIGRAM(S): at 21:47

## 2021-12-23 RX ADMIN — NAFCILLIN 200 GRAM(S): 10 INJECTION, POWDER, FOR SOLUTION INTRAVENOUS at 13:39

## 2021-12-23 RX ADMIN — NAFCILLIN 200 GRAM(S): 10 INJECTION, POWDER, FOR SOLUTION INTRAVENOUS at 05:19

## 2021-12-23 RX ADMIN — Medication 650 MILLIGRAM(S): at 22:50

## 2021-12-23 RX ADMIN — NAFCILLIN 200 GRAM(S): 10 INJECTION, POWDER, FOR SOLUTION INTRAVENOUS at 21:48

## 2021-12-23 RX ADMIN — NAFCILLIN 200 GRAM(S): 10 INJECTION, POWDER, FOR SOLUTION INTRAVENOUS at 09:44

## 2021-12-23 RX ADMIN — CHLORHEXIDINE GLUCONATE 1 APPLICATION(S): 213 SOLUTION TOPICAL at 14:09

## 2021-12-23 RX ADMIN — POTASSIUM PHOSPHATE, MONOBASIC POTASSIUM PHOSPHATE, DIBASIC 62.5 MILLIMOLE(S): 236; 224 INJECTION, SOLUTION INTRAVENOUS at 14:34

## 2021-12-23 RX ADMIN — NAFCILLIN 200 GRAM(S): 10 INJECTION, POWDER, FOR SOLUTION INTRAVENOUS at 03:00

## 2021-12-23 RX ADMIN — NAFCILLIN 200 GRAM(S): 10 INJECTION, POWDER, FOR SOLUTION INTRAVENOUS at 18:38

## 2021-12-23 RX ADMIN — PANTOPRAZOLE SODIUM 40 MILLIGRAM(S): 20 TABLET, DELAYED RELEASE ORAL at 05:19

## 2021-12-23 NOTE — PROGRESS NOTE ADULT - ASSESSMENT
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    LE weakness, MSSA bacteremia, cervical and lumbar osteomyelitis, psoas abscess, s/p C3-C6 laminectomy   COVID+ - s/p monoclonal ab infusion 12/21    Piter Vasquez  Attending Physician   Division of Infectious Disease  Pager #[phone]  Available on Microsoft Teams also  After 5pm/weekend or no response, call #[phone]

## 2021-12-23 NOTE — PROGRESS NOTE ADULT - PROBLEM SELECTOR PLAN 6
-from COVID -s/p monoclonal ab infusion 12/21  -no phlebitis  -cont current abx -from COVID -s/p monoclonal ab infusion 12/21  -no phlebitis  -cont current abx  -monitor temps

## 2021-12-23 NOTE — PROGRESS NOTE ADULT - CONSTITUTIONAL DETAILS
no distress/cachectic
no distress
well-groomed/no distress
comfortable on RA/no distress
no distress

## 2021-12-23 NOTE — PROGRESS NOTE ADULT - PROBLEM SELECTOR PLAN 5
-day 10 of 56 of abx  -picc  -potential side effects of abx explained including GI, Cdiff, allergy issues, development of resistance, etc.  -potential side effects of PICC explained including bleeding and infection  -weekly cbc, bun/creatinine, esr, crp -day 11 of 56 of abx  -picc  -potential side effects of abx explained including GI, Cdiff, allergy issues, development of resistance, etc.  -potential side effects of PICC explained including bleeding and infection  -weekly cbc, bun/creatinine, esr, crp

## 2021-12-23 NOTE — PROGRESS NOTE ADULT - ATTENDING COMMENTS
65 yo M with pmh of DM and Hypertension, presented with back pain and found to have discitis/OM now s/p laminectomy and [facility] and transferred back to [facility] for further management. Sepsis, resolved. 2/2 OM + psoas abscess. BCx 12/10 with MSSA and S. epidermidis. No intervention for psoas abscess. S/p laminectomy C3-C6 on 12/14. C/w nafcillin 2g q4 hours. Echo normal but did not exclude endocarditis. ID following. Will need 8 week course of antibiotics.  now COVID+, stilll febrile but not hypoxic. received mAb 12/21. blood cx 12/20 NGTD. pt will need rehab placement but has no insurance. [facility]. will place PICC once afebrile and close to the end of COVID quarantine period

## 2021-12-23 NOTE — PROGRESS NOTE ADULT - NEUROLOGICAL DETAILS
alert and oriented x 3/responds to verbal commands
responds to verbal commands
alert and oriented x 3/responds to verbal commands

## 2021-12-23 NOTE — PROGRESS NOTE ADULT - RS GEN PE MLT RESP DETAILS PC
respirations non-labored/clear to auscultation bilaterally/no wheezes
clear to auscultation bilaterally/no wheezes
respirations non-labored/clear to auscultation bilaterally/no wheezes

## 2021-12-23 NOTE — PROGRESS NOTE ADULT - MS EXT PE MLT D E PC
Spoke to patient regarding scans.  US shows progressive disease in liver.  Patient still not interested in hospice.  Ok to be seen by GI and IR of IVC filter.    
no cyanosis/no pedal edema
no cyanosis
no cyanosis/no pedal edema

## 2021-12-23 NOTE — PROGRESS NOTE ADULT - NEGATIVE GASTROINTESTINAL SYMPTOMS
no nausea/no vomiting/no diarrhea/no abdominal pain

## 2021-12-23 NOTE — PROGRESS NOTE ADULT - SUBJECTIVE AND OBJECTIVE BOX
Peter Herbert MD  [phone]/[facility] [id number]      PROGRESS NOTE:     Patient is a 66y old  Male who presents with a chief complaint of Cervical and Lumbar Osteomyelitis (22 Dec 2021 15:49)      SUBJECTIVE / OVERNIGHT EVENTS:    No acute events overnight.   Patient examined at bedside        MEDICATIONS  (STANDING):  chlorhexidine 2% Cloths 1 Application(s) Topical daily  nafcillin  IVPB      nafcillin  IVPB 2 Gram(s) IV Intermittent every 4 hours  pantoprazole    Tablet 40 milliGRAM(s) Oral before breakfast  senna 2 Tablet(s) Oral at bedtime    MEDICATIONS  (PRN):  acetaminophen     Tablet .. 650 milliGRAM(s) Oral every 6 hours PRN Temp greater or equal to 38C (100.4F), Mild Pain (1 - 3)  cyclobenzaprine 5 milliGRAM(s) Oral every 8 hours PRN Muscle Spasm  guaiFENesin Oral Liquid (Sugar-Free) 100 milliGRAM(s) Oral every 6 hours PRN Cough      CAPILLARY BLOOD GLUCOSE        I&O's Summary    21 Dec 2021 07:01  -  22 Dec 2021 07:00  --------------------------------------------------------  IN: 1440 mL / OUT: 1300 mL / NET: 140 mL    22 Dec 2021 07:01  -  23 Dec 2021 06:47  --------------------------------------------------------  IN: 100 mL / OUT: 1050 mL / NET: -950 mL        PHYSICAL EXAM:  Vital Signs Last 24 Hrs  T(C): 37.4 (23 Dec 2021 05:38), Max: 38.1 (22 Dec 2021 21:10)  T(F): 99.4 (23 Dec 2021 05:38), Max: 100.6 (22 Dec 2021 21:10)  HR: 68 (23 Dec 2021 05:38) (68 - 80)  BP: 131/81 (23 Dec 2021 05:38) (110/69 - 142/80)  BP(mean): --  RR: 16 (23 Dec 2021 05:38) (16 - 17)  SpO2: 100% (23 Dec 2021 05:38) (100% - 100%)    CONSTITUTIONAL: NAD; well-developed  HEENT: PERRL, clear conjunctiva  RESPIRATORY: Normal respiratory effort; lungs are clear to auscultation bilaterally; No Crackles/Rhonchi/Wheezing  CARDIOVASCULAR: Regular rate and rhythm, normal S1 and S2, no murmur/rub/gallop; No lower extremity edema; Peripheral pulses are 2+ bilaterally  ABDOMEN: Nontender to palpation, normoactive bowel sounds, no rebound/guarding; No hepatosplenomegaly  MUSCLOSKELETAL: no clubbing or cyanosis of digits; no joint swelling or tenderness to palpation  EXTREMITY: Lower extremities Non-tender to palpation; non-erythematous B/L  Neuro: A&Ox3; no focal deficits   Psych: normal mood; Affect appropirate    LABS:                        10.8   6.12  )-----------( 767      ( 22 Dec 2021 06:44 )             31.9     12-22    135  |  96<L>  |  12  ----------------------------<  88  3.4<L>   |  24  |  0.95    Ca    8.2<L>      22 Dec 2021 06:44  Phos  2.6     12-22  Mg     1.90     12-22    TPro  6.9  /  Alb  2.6<L>  /  TBili  0.2  /  DBili  x   /  AST  16  /  ALT  16  /  AlkPhos  74  12-22              Culture - Blood (collected 20 Dec 2021 14:54)  Source: .Blood Blood-Peripheral  Preliminary Report (21 Dec 2021 15:02):    No growth to date.    Culture - Blood (collected 20 Dec 2021 12:17)  Source: .Blood Blood-Peripheral  Preliminary Report (21 Dec 2021 13:02):    No growth to date.        RADIOLOGY & ADDITIONAL TESTS:  Results Reviewed:   Imaging Personally Reviewed:  Electrocardiogram Personally Reviewed:    COORDINATION OF CARE:  Care Discussed with Consultants/Other Providers [Y/N]:  Prior or Outpatient Records Reviewed [Y/N]:   Peter Herbert MD  [phone]/[facility] [id number]      PROGRESS NOTE:     Patient is a 66y old  Male who presents with a chief complaint of Cervical and Lumbar Osteomyelitis (22 Dec 2021 15:49)      SUBJECTIVE / OVERNIGHT EVENTS:  OVERNIGHT:  - Fever 100.6     Patient examined at bedside with same dry cough and minor ABD discomfort. Otherwise, not SOB, no CP.        MEDICATIONS  (STANDING):  chlorhexidine 2% Cloths 1 Application(s) Topical daily  nafcillin  IVPB      nafcillin  IVPB 2 Gram(s) IV Intermittent every 4 hours  pantoprazole    Tablet 40 milliGRAM(s) Oral before breakfast  senna 2 Tablet(s) Oral at bedtime    MEDICATIONS  (PRN):  acetaminophen     Tablet .. 650 milliGRAM(s) Oral every 6 hours PRN Temp greater or equal to 38C (100.4F), Mild Pain (1 - 3)  cyclobenzaprine 5 milliGRAM(s) Oral every 8 hours PRN Muscle Spasm  guaiFENesin Oral Liquid (Sugar-Free) 100 milliGRAM(s) Oral every 6 hours PRN Cough      CAPILLARY BLOOD GLUCOSE        I&O's Summary    21 Dec 2021 07:01  -  22 Dec 2021 07:00  --------------------------------------------------------  IN: 1440 mL / OUT: 1300 mL / NET: 140 mL    22 Dec 2021 07:01  -  23 Dec 2021 06:47  --------------------------------------------------------  IN: 100 mL / OUT: 1050 mL / NET: -950 mL        PHYSICAL EXAM:  Vital Signs Last 24 Hrs  T(C): 37.4 (23 Dec 2021 05:38), Max: 38.1 (22 Dec 2021 21:10)  T(F): 99.4 (23 Dec 2021 05:38), Max: 100.6 (22 Dec 2021 21:10)  HR: 68 (23 Dec 2021 05:38) (68 - 80)  BP: 131/81 (23 Dec 2021 05:38) (110/69 - 142/80)  BP(mean): --  RR: 16 (23 Dec 2021 05:38) (16 - 17)  SpO2: 100% (23 Dec 2021 05:38) (100% - 100%)    CONSTITUTIONAL: NAD; thin-appearing; C-collar   HEENT: PERRL, clear conjunctiva  RESPIRATORY: Normal respiratory effort; lungs are clear to auscultation bilaterally; No Crackles/Rhonchi/Wheezing  CARDIOVASCULAR: Regular rate and rhythm, normal S1 and S2, no murmur/rub/gallop; No lower extremity edema; Peripheral pulses are 2+ bilaterally  ABDOMEN: Nontender to palpation, normoactive bowel sounds, no rebound/guarding; No hepatosplenomegaly  MUSCLOSKELETAL: no clubbing or cyanosis of digits; no joint swelling or tenderness to palpation  EXTREMITY: Lower extremities Non-tender to palpation; non-erythematous B/L  Neuro: A&Ox3; no focal deficits   Psych: normal mood; Affect appropirate    LABS:                        10.8   6.12  )-----------( 767      ( 22 Dec 2021 06:44 )             31.9     12-22    135  |  96<L>  |  12  ----------------------------<  88  3.4<L>   |  24  |  0.95    Ca    8.2<L>      22 Dec 2021 06:44  Phos  2.6     12-22  Mg     1.90     12-22    TPro  6.9  /  Alb  2.6<L>  /  TBili  0.2  /  DBili  x   /  AST  16  /  ALT  16  /  AlkPhos  74  12-22              Culture - Blood (collected 20 Dec 2021 14:54)  Source: .Blood Blood-Peripheral  Preliminary Report (21 Dec 2021 15:02):    No growth to date.    Culture - Blood (collected 20 Dec 2021 12:17)  Source: .Blood Blood-Peripheral  Preliminary Report (21 Dec 2021 13:02):    No growth to date.        RADIOLOGY & ADDITIONAL TESTS:  Results Reviewed:   Imaging Personally Reviewed:  Electrocardiogram Personally Reviewed:    COORDINATION OF CARE:  Care Discussed with Consultants/Other Providers [Y/N]:  Prior or Outpatient Records Reviewed [Y/N]:

## 2021-12-23 NOTE — PROGRESS NOTE ADULT - GASTROINTESTINAL DETAILS
soft/nontender/no distention/no rebound tenderness/no guarding
soft/nontender/no distention/no guarding/no rigidity

## 2021-12-23 NOTE — PROGRESS NOTE ADULT - NSPROGADDITIONALINFOA_GEN_ALL_CORE
ID coverage available over [place] 3-day weekend (Dec 24-26) if needed. Call #[phone] for questions/concerns.     I am at [facility] from 12/27, ID to cover #[phone]. ID coverage available over [place] 3-day weekend (Dec 24-26) if needed. Call #[phone] for questions/concerns.     I am at [facility] from 12/27, ID to cover #[phone].

## 2021-12-23 NOTE — PROGRESS NOTE ADULT - NEGATIVE NEUROLOGICAL SYMPTOMS
no headache/no confusion

## 2021-12-23 NOTE — PROGRESS NOTE ADULT - PROBLEM SELECTOR PLAN 5
Diet: Consistent Carb  DVT: SCDs - chemical prophylaxis contraindicated   Dispo: Home if patient is able to ambulate  - Patient has no insurance, will f[facility] necessary

## 2021-12-23 NOTE — PROGRESS NOTE ADULT - NEGATIVE ALLERGIC REACTIONS
no respiratory distress

## 2021-12-23 NOTE — PROGRESS NOTE ADULT - PROBLEM SELECTOR PLAN 7
Diet: Consistent Carb  DVT: SCDs - chemical prophylaxis contraindicated   Dispo: in patient.  - PT recs rehab  - PMR - recs Acute in patient rehab  - Patient will need [facility] 2/2 lack of insurance.
-pt with cough and fever  -hospital acquired COVID - not hypoxic, s/p monoclonal ab infusion   -no indication for remdesvir/dexamethasone - not hypoxic
-pt with cough and fever  -hospital acquired COVID - not hypoxic, s/p monoclonal ab infusion   -no indication for remdesvir/dexamethasone
-pt with cough and fever  -hospital acquired COVID - not hypoxic, suggest monoclonal ab infusion   -consent obtained for monoclonal ab infusion  -risks and benefits explained to pt   -no indication for remdesvir/dexamethasone

## 2021-12-23 NOTE — PROGRESS NOTE ADULT - PROBLEM SELECTOR PLAN 1
COVID +, Patient on RA with dry cough. Not experiecing SOB, or CP.  S/P Casirivimab/imdevimab  - On isolation/droplet precautions   - Monitor symptoms   - Cough medicine PRN  -Tylenol prn

## 2021-12-24 LAB
ALBUMIN SERPL ELPH-MCNC: 2.6 G/DL — LOW (ref 3.3–5)
ALP SERPL-CCNC: 78 U/L — SIGNIFICANT CHANGE UP (ref 40–120)
ALT FLD-CCNC: 12 U/L — SIGNIFICANT CHANGE UP (ref 4–41)
ANION GAP SERPL CALC-SCNC: 13 MMOL/L — SIGNIFICANT CHANGE UP (ref 7–14)
AST SERPL-CCNC: 14 U/L — SIGNIFICANT CHANGE UP (ref 4–40)
BASOPHILS # BLD AUTO: 0.01 K/UL — SIGNIFICANT CHANGE UP (ref 0–0.2)
BASOPHILS NFR BLD AUTO: 0.2 % — SIGNIFICANT CHANGE UP (ref 0–2)
BILIRUB SERPL-MCNC: <0.2 MG/DL — SIGNIFICANT CHANGE UP (ref 0.2–1.2)
BUN SERPL-MCNC: 9 MG/DL — SIGNIFICANT CHANGE UP (ref 7–23)
CALCIUM SERPL-MCNC: 8.1 MG/DL — LOW (ref 8.4–10.5)
CHLORIDE SERPL-SCNC: 100 MMOL/L — SIGNIFICANT CHANGE UP (ref 98–107)
CO2 SERPL-SCNC: 24 MMOL/L — SIGNIFICANT CHANGE UP (ref 22–31)
CREAT SERPL-MCNC: 0.74 MG/DL — SIGNIFICANT CHANGE UP (ref 0.5–1.3)
EOSINOPHIL # BLD AUTO: 0.2 K/UL — SIGNIFICANT CHANGE UP (ref 0–0.5)
EOSINOPHIL NFR BLD AUTO: 4.1 % — SIGNIFICANT CHANGE UP (ref 0–6)
GLUCOSE SERPL-MCNC: 83 MG/DL — SIGNIFICANT CHANGE UP (ref 70–99)
HCT VFR BLD CALC: 31.1 % — LOW (ref 39–50)
HGB BLD-MCNC: 10.4 G/DL — LOW (ref 13–17)
IANC: 2.15 K/UL — SIGNIFICANT CHANGE UP (ref 1.5–8.5)
IMM GRANULOCYTES NFR BLD AUTO: 0.6 % — SIGNIFICANT CHANGE UP (ref 0–1.5)
LYMPHOCYTES # BLD AUTO: 2.04 K/UL — SIGNIFICANT CHANGE UP (ref 1–3.3)
LYMPHOCYTES # BLD AUTO: 42.1 % — SIGNIFICANT CHANGE UP (ref 13–44)
MAGNESIUM SERPL-MCNC: 1.9 MG/DL — SIGNIFICANT CHANGE UP (ref 1.6–2.6)
MCHC RBC-ENTMCNC: 31 PG — SIGNIFICANT CHANGE UP (ref 27–34)
MCHC RBC-ENTMCNC: 33.4 GM/DL — SIGNIFICANT CHANGE UP (ref 32–36)
MCV RBC AUTO: 92.8 FL — SIGNIFICANT CHANGE UP (ref 80–100)
MONOCYTES # BLD AUTO: 0.41 K/UL — SIGNIFICANT CHANGE UP (ref 0–0.9)
MONOCYTES NFR BLD AUTO: 8.5 % — SIGNIFICANT CHANGE UP (ref 2–14)
NEUTROPHILS # BLD AUTO: 2.15 K/UL — SIGNIFICANT CHANGE UP (ref 1.8–7.4)
NEUTROPHILS NFR BLD AUTO: 44.5 % — SIGNIFICANT CHANGE UP (ref 43–77)
NRBC # BLD: 0 /100 WBCS — SIGNIFICANT CHANGE UP
NRBC # FLD: 0 K/UL — SIGNIFICANT CHANGE UP
PHOSPHATE SERPL-MCNC: 2.6 MG/DL — SIGNIFICANT CHANGE UP (ref 2.5–4.5)
PLATELET # BLD AUTO: 810 K/UL — HIGH (ref 150–400)
POTASSIUM SERPL-MCNC: 3.7 MMOL/L — SIGNIFICANT CHANGE UP (ref 3.5–5.3)
POTASSIUM SERPL-SCNC: 3.7 MMOL/L — SIGNIFICANT CHANGE UP (ref 3.5–5.3)
PROT SERPL-MCNC: 6.7 G/DL — SIGNIFICANT CHANGE UP (ref 6–8.3)
RBC # BLD: 3.35 M/UL — LOW (ref 4.2–5.8)
RBC # FLD: 16.5 % — HIGH (ref 10.3–14.5)
SODIUM SERPL-SCNC: 137 MMOL/L — SIGNIFICANT CHANGE UP (ref 135–145)
WBC # BLD: 4.84 K/UL — SIGNIFICANT CHANGE UP (ref 3.8–10.5)
WBC # FLD AUTO: 4.84 K/UL — SIGNIFICANT CHANGE UP (ref 3.8–10.5)

## 2021-12-24 PROCEDURE — 99233 SBSQ HOSP IP/OBS HIGH 50: CPT

## 2021-12-24 RX ADMIN — NAFCILLIN 200 GRAM(S): 10 INJECTION, POWDER, FOR SOLUTION INTRAVENOUS at 21:21

## 2021-12-24 RX ADMIN — Medication 650 MILLIGRAM(S): at 19:39

## 2021-12-24 RX ADMIN — NAFCILLIN 200 GRAM(S): 10 INJECTION, POWDER, FOR SOLUTION INTRAVENOUS at 14:03

## 2021-12-24 RX ADMIN — Medication 100 MILLIGRAM(S): at 05:47

## 2021-12-24 RX ADMIN — PANTOPRAZOLE SODIUM 40 MILLIGRAM(S): 20 TABLET, DELAYED RELEASE ORAL at 05:49

## 2021-12-24 RX ADMIN — Medication 100 MILLIGRAM(S): at 14:00

## 2021-12-24 RX ADMIN — CHLORHEXIDINE GLUCONATE 1 APPLICATION(S): 213 SOLUTION TOPICAL at 14:57

## 2021-12-24 RX ADMIN — NAFCILLIN 200 GRAM(S): 10 INJECTION, POWDER, FOR SOLUTION INTRAVENOUS at 09:21

## 2021-12-24 RX ADMIN — Medication 650 MILLIGRAM(S): at 20:35

## 2021-12-24 RX ADMIN — NAFCILLIN 200 GRAM(S): 10 INJECTION, POWDER, FOR SOLUTION INTRAVENOUS at 17:20

## 2021-12-24 RX ADMIN — NAFCILLIN 200 GRAM(S): 10 INJECTION, POWDER, FOR SOLUTION INTRAVENOUS at 05:50

## 2021-12-24 RX ADMIN — Medication 100 MILLIGRAM(S): at 21:20

## 2021-12-24 RX ADMIN — NAFCILLIN 200 GRAM(S): 10 INJECTION, POWDER, FOR SOLUTION INTRAVENOUS at 01:15

## 2021-12-24 NOTE — PROGRESS NOTE ADULT - SUBJECTIVE AND OBJECTIVE BOX
Peter Herbert MD  [phone]/[facility] [id number]      PROGRESS NOTE:     Patient is a 66y old  Male who presents with a chief complaint of Cervical and Lumbar Osteomyelitis (23 Dec 2021 12:09)      SUBJECTIVE / OVERNIGHT EVENTS:    OVERNIGHT:       Patient examined at bedside        MEDICATIONS  (STANDING):  chlorhexidine 2% Cloths 1 Application(s) Topical daily  nafcillin  IVPB      nafcillin  IVPB 2 Gram(s) IV Intermittent every 4 hours  pantoprazole    Tablet 40 milliGRAM(s) Oral before breakfast  senna 2 Tablet(s) Oral at bedtime    MEDICATIONS  (PRN):  acetaminophen     Tablet .. 650 milliGRAM(s) Oral every 6 hours PRN Temp greater or equal to 38C (100.4F), Mild Pain (1 - 3)  cyclobenzaprine 5 milliGRAM(s) Oral every 8 hours PRN Muscle Spasm  guaiFENesin Oral Liquid (Sugar-Free) 100 milliGRAM(s) Oral every 6 hours PRN Cough      CAPILLARY BLOOD GLUCOSE        I&O's Summary    23 Dec 2021 07:01  -  24 Dec 2021 07:00  --------------------------------------------------------  IN: 2420 mL / OUT: 1050 mL / NET: 1370 mL    24 Dec 2021 07:01  -  24 Dec 2021 07:17  --------------------------------------------------------  IN: 0 mL / OUT: 200 mL / NET: -200 mL        PHYSICAL EXAM:  Vital Signs Last 24 Hrs  T(C): 37.1 (24 Dec 2021 05:45), Max: 38 (23 Dec 2021 21:45)  T(F): 98.8 (24 Dec 2021 05:45), Max: 100.4 (23 Dec 2021 21:45)  HR: 68 (24 Dec 2021 05:45) (67 - 77)  BP: 133/79 (24 Dec 2021 05:45) (130/84 - 137/78)  BP(mean): --  RR: 18 (24 Dec 2021 05:45) (16 - 18)  SpO2: 100% (24 Dec 2021 05:45) (100% - 100%)    CONSTITUTIONAL: NAD; well-developed  HEENT: PERRL, clear conjunctiva  RESPIRATORY: Normal respiratory effort; lungs are clear to auscultation bilaterally; No Crackles/Rhonchi/Wheezing  CARDIOVASCULAR: Regular rate and rhythm, normal S1 and S2, no murmur/rub/gallop; No lower extremity edema; Peripheral pulses are 2+ bilaterally  ABDOMEN: Nontender to palpation, normoactive bowel sounds, no rebound/guarding; No hepatosplenomegaly  MUSCLOSKELETAL: no clubbing or cyanosis of digits; no joint swelling or tenderness to palpation  EXTREMITY: Lower extremities Non-tender to palpation; non-erythematous B/L  Neuro: A&Ox3; no focal deficits   Psych: normal mood; Affect appropirate    LABS:                        11.3   4.45  )-----------( 754      ( 23 Dec 2021 11:52 )             32.9     12-23    134<L>  |  101  |  10  ----------------------------<  133<H>  3.7   |  22  |  0.75    Ca    8.1<L>      23 Dec 2021 11:52  Phos  2.1     12-23  Mg     1.90     12-23    TPro  6.5  /  Alb  2.5<L>  /  TBili  0.2  /  DBili  x   /  AST  16  /  ALT  15  /  AlkPhos  74  12-23                RADIOLOGY & ADDITIONAL TESTS:  Results Reviewed:   Imaging Personally Reviewed:  Electrocardiogram Personally Reviewed:    COORDINATION OF CARE:  Care Discussed with Consultants/Other Providers [Y/N]:  Prior or Outpatient Records Reviewed [Y/N]:   Peter Herbert MD  [phone]/[facility] [id number]      PROGRESS NOTE:     Patient is a 66y old  Male who presents with a chief complaint of Cervical and Lumbar Osteomyelitis (23 Dec 2021 12:09)      SUBJECTIVE / OVERNIGHT EVENTS:    OVERNIGHT:   - No issues     Patient was seen and examined at bedside this morning. Denies any nausea/vomiting/diarrhea, headache, shortness of breath, abdominal pain or chest pain/palpitations. Patient responding appropriately to questions and able to make needs known. Vital signs/imaging reviewed.          MEDICATIONS  (STANDING):  chlorhexidine 2% Cloths 1 Application(s) Topical daily  nafcillin  IVPB      nafcillin  IVPB 2 Gram(s) IV Intermittent every 4 hours  pantoprazole    Tablet 40 milliGRAM(s) Oral before breakfast  senna 2 Tablet(s) Oral at bedtime    MEDICATIONS  (PRN):  acetaminophen     Tablet .. 650 milliGRAM(s) Oral every 6 hours PRN Temp greater or equal to 38C (100.4F), Mild Pain (1 - 3)  cyclobenzaprine 5 milliGRAM(s) Oral every 8 hours PRN Muscle Spasm  guaiFENesin Oral Liquid (Sugar-Free) 100 milliGRAM(s) Oral every 6 hours PRN Cough      CAPILLARY BLOOD GLUCOSE        I&O's Summary    23 Dec 2021 07:01  -  24 Dec 2021 07:00  --------------------------------------------------------  IN: 2420 mL / OUT: 1050 mL / NET: 1370 mL    24 Dec 2021 07:01  -  24 Dec 2021 07:17  --------------------------------------------------------  IN: 0 mL / OUT: 200 mL / NET: -200 mL        PHYSICAL EXAM:  Vital Signs Last 24 Hrs  T(C): 37.1 (24 Dec 2021 05:45), Max: 38 (23 Dec 2021 21:45)  T(F): 98.8 (24 Dec 2021 05:45), Max: 100.4 (23 Dec 2021 21:45)  HR: 68 (24 Dec 2021 05:45) (67 - 77)  BP: 133/79 (24 Dec 2021 05:45) (130/84 - 137/78)  BP(mean): --  RR: 18 (24 Dec 2021 05:45) (16 - 18)  SpO2: 100% (24 Dec 2021 05:45) (100% - 100%)    CONSTITUTIONAL: NAD; Thin-appearing; Wearing C-Collar   HEENT: PERRL, clear conjunctiva  RESPIRATORY: Normal respiratory effort; lungs are clear to auscultation bilaterally; No Crackles/Rhonchi/Wheezing; + Dry cough   CARDIOVASCULAR: Regular rate and rhythm, normal S1 and S2, no murmur/rub/gallop; No lower extremity edema; Peripheral pulses are 2+ bilaterally  ABDOMEN: Nontender to palpation, normoactive bowel sounds, no rebound/guarding; No hepatosplenomegaly  MUSCLOSKELETAL: no clubbing or cyanosis of digits; no joint swelling or tenderness to palpation  EXTREMITY: Lower extremities Non-tender to palpation; non-erythematous B/L  Neuro: A&Ox3; no focal deficits   Psych: normal mood; Affect appropirate    LABS:                        11.3   4.45  )-----------( 754      ( 23 Dec 2021 11:52 )             32.9     12-23    134<L>  |  101  |  10  ----------------------------<  133<H>  3.7   |  22  |  0.75    Ca    8.1<L>      23 Dec 2021 11:52  Phos  2.1     12-23  Mg     1.90     12-23    TPro  6.5  /  Alb  2.5<L>  /  TBili  0.2  /  DBili  x   /  AST  16  /  ALT  15  /  AlkPhos  74  12-23                RADIOLOGY & ADDITIONAL TESTS:  Results Reviewed:   Imaging Personally Reviewed:  Electrocardiogram Personally Reviewed:    COORDINATION OF CARE:  Care Discussed with Consultants/Other Providers [Y/N]:  Prior or Outpatient Records Reviewed [Y/N]:

## 2021-12-24 NOTE — PROGRESS NOTE ADULT - PROBLEM SELECTOR PLAN 1
COVID +, Patient on RA with dry cough. Not experiecing SOB, or CP.  S/P Casirivimab/imdevimab  - On isolation/droplet precautions   - Monitor symptoms   - Cough medicine PRN  - Tylenol prn

## 2021-12-24 NOTE — PROGRESS NOTE ADULT - ATTENDING COMMENTS
65 yo M with pmh of DM and Hypertension, presented with back pain and found to have discitis/OM now s/p laminectomy and [facility] and transferred back to [facility] for further management. Sepsis, resolved. 2/2 OM + psoas abscess (not drained, IR consulted, but believed area was too small to drain). BCx 12/10 with MSSA and S. epidermidis. No intervention for psoas abscess. S/p laminectomy C3-C6 on 12/14. C/w nafcillin 2g q4 hours. Echo normal but did not exclude endocarditis. ID following. Will need 8 week course of antibiotics.  now COVID+, stilll febrile but not hypoxic. received mAb 12/21. blood cx 12/20 NGTD. pt will need rehab placement but has no insurance. f[facility]u SW for [facility]. will place PICC once afebrile and close to the end of COVID quarantine period    12/24-- Continuing current mgmt. Continued to fever overnight. This could be due to COVID infxn or inadequate source control of MSSA. Notably, patient is still complaining of L hip pain. 65 yo M with pmh of DM and Hypertension, presented with back pain and found to have discitis/OM now s/p laminectomy and [facility] and transferred back to [facility] for further management. Sepsis, resolved. 2/2 OM + psoas abscess (not drained, IR consulted, but believed area was too small to drain). BCx 12/10 with MSSA and S. epidermidis. No intervention for psoas abscess. S/p laminectomy C3-C6 on 12/14. C/w nafcillin 2g q4 hours. Echo normal but did not exclude endocarditis. ID following. Will need 8 week course of antibiotics.  now COVID+, stilll febrile but not hypoxic. received mAb 12/21. blood cx 12/20 NGTD. pt will need rehab placement but has no insurance. f[facility]u [facility] for [facility]. will place PICC once afebrile and close to the end of COVID quarantine period    12/24-- Continuing current mgmt. Continued to fever overnight. This could be due to COVID infxn or inadequate source control of MSSA. Notably, patient is still complaining of L hip pain. Needs frequent reassessment of psoas abscess site as this was not drained, w hopes that it will decr in size with antibiotics. Cont supportive mgmt of covid, currently pt is on room air.

## 2021-12-25 LAB
ALBUMIN SERPL ELPH-MCNC: 2.7 G/DL — LOW (ref 3.3–5)
ALP SERPL-CCNC: 69 U/L — SIGNIFICANT CHANGE UP (ref 40–120)
ALT FLD-CCNC: 11 U/L — SIGNIFICANT CHANGE UP (ref 4–41)
ANION GAP SERPL CALC-SCNC: 11 MMOL/L — SIGNIFICANT CHANGE UP (ref 7–14)
AST SERPL-CCNC: 15 U/L — SIGNIFICANT CHANGE UP (ref 4–40)
BASOPHILS # BLD AUTO: 0.01 K/UL — SIGNIFICANT CHANGE UP (ref 0–0.2)
BASOPHILS NFR BLD AUTO: 0.2 % — SIGNIFICANT CHANGE UP (ref 0–2)
BILIRUB SERPL-MCNC: 0.3 MG/DL — SIGNIFICANT CHANGE UP (ref 0.2–1.2)
BUN SERPL-MCNC: 10 MG/DL — SIGNIFICANT CHANGE UP (ref 7–23)
CALCIUM SERPL-MCNC: 8.3 MG/DL — LOW (ref 8.4–10.5)
CHLORIDE SERPL-SCNC: 100 MMOL/L — SIGNIFICANT CHANGE UP (ref 98–107)
CO2 SERPL-SCNC: 25 MMOL/L — SIGNIFICANT CHANGE UP (ref 22–31)
CREAT SERPL-MCNC: 0.75 MG/DL — SIGNIFICANT CHANGE UP (ref 0.5–1.3)
CULTURE RESULTS: SIGNIFICANT CHANGE UP
CULTURE RESULTS: SIGNIFICANT CHANGE UP
EOSINOPHIL # BLD AUTO: 0.3 K/UL — SIGNIFICANT CHANGE UP (ref 0–0.5)
EOSINOPHIL NFR BLD AUTO: 6.3 % — HIGH (ref 0–6)
GLUCOSE SERPL-MCNC: 111 MG/DL — HIGH (ref 70–99)
HCT VFR BLD CALC: 31.3 % — LOW (ref 39–50)
HGB BLD-MCNC: 10.8 G/DL — LOW (ref 13–17)
IANC: 2.04 K/UL — SIGNIFICANT CHANGE UP (ref 1.5–8.5)
IMM GRANULOCYTES NFR BLD AUTO: 1.3 % — SIGNIFICANT CHANGE UP (ref 0–1.5)
LYMPHOCYTES # BLD AUTO: 1.94 K/UL — SIGNIFICANT CHANGE UP (ref 1–3.3)
LYMPHOCYTES # BLD AUTO: 41 % — SIGNIFICANT CHANGE UP (ref 13–44)
MAGNESIUM SERPL-MCNC: 1.9 MG/DL — SIGNIFICANT CHANGE UP (ref 1.6–2.6)
MCHC RBC-ENTMCNC: 31.2 PG — SIGNIFICANT CHANGE UP (ref 27–34)
MCHC RBC-ENTMCNC: 34.5 GM/DL — SIGNIFICANT CHANGE UP (ref 32–36)
MCV RBC AUTO: 90.5 FL — SIGNIFICANT CHANGE UP (ref 80–100)
MONOCYTES # BLD AUTO: 0.38 K/UL — SIGNIFICANT CHANGE UP (ref 0–0.9)
MONOCYTES NFR BLD AUTO: 8 % — SIGNIFICANT CHANGE UP (ref 2–14)
NEUTROPHILS # BLD AUTO: 2.04 K/UL — SIGNIFICANT CHANGE UP (ref 1.8–7.4)
NEUTROPHILS NFR BLD AUTO: 43.2 % — SIGNIFICANT CHANGE UP (ref 43–77)
NRBC # BLD: 0 /100 WBCS — SIGNIFICANT CHANGE UP
NRBC # FLD: 0 K/UL — SIGNIFICANT CHANGE UP
PHOSPHATE SERPL-MCNC: 2.3 MG/DL — LOW (ref 2.5–4.5)
PLATELET # BLD AUTO: 794 K/UL — HIGH (ref 150–400)
POTASSIUM SERPL-MCNC: 3.6 MMOL/L — SIGNIFICANT CHANGE UP (ref 3.5–5.3)
POTASSIUM SERPL-SCNC: 3.6 MMOL/L — SIGNIFICANT CHANGE UP (ref 3.5–5.3)
PROT SERPL-MCNC: 6.7 G/DL — SIGNIFICANT CHANGE UP (ref 6–8.3)
RBC # BLD: 3.46 M/UL — LOW (ref 4.2–5.8)
RBC # FLD: 16.9 % — HIGH (ref 10.3–14.5)
SODIUM SERPL-SCNC: 136 MMOL/L — SIGNIFICANT CHANGE UP (ref 135–145)
SPECIMEN SOURCE: SIGNIFICANT CHANGE UP
SPECIMEN SOURCE: SIGNIFICANT CHANGE UP
WBC # BLD: 4.73 K/UL — SIGNIFICANT CHANGE UP (ref 3.8–10.5)
WBC # FLD AUTO: 4.73 K/UL — SIGNIFICANT CHANGE UP (ref 3.8–10.5)

## 2021-12-25 PROCEDURE — 99233 SBSQ HOSP IP/OBS HIGH 50: CPT

## 2021-12-25 RX ORDER — POTASSIUM PHOSPHATE, MONOBASIC POTASSIUM PHOSPHATE, DIBASIC 236; 224 MG/ML; MG/ML
15 INJECTION, SOLUTION INTRAVENOUS ONCE
Refills: 0 | Status: COMPLETED | OUTPATIENT
Start: 2021-12-25 | End: 2021-12-25

## 2021-12-25 RX ADMIN — NAFCILLIN 200 GRAM(S): 10 INJECTION, POWDER, FOR SOLUTION INTRAVENOUS at 01:06

## 2021-12-25 RX ADMIN — POTASSIUM PHOSPHATE, MONOBASIC POTASSIUM PHOSPHATE, DIBASIC 62.5 MILLIMOLE(S): 236; 224 INJECTION, SOLUTION INTRAVENOUS at 10:45

## 2021-12-25 RX ADMIN — NAFCILLIN 200 GRAM(S): 10 INJECTION, POWDER, FOR SOLUTION INTRAVENOUS at 17:15

## 2021-12-25 RX ADMIN — CHLORHEXIDINE GLUCONATE 1 APPLICATION(S): 213 SOLUTION TOPICAL at 09:11

## 2021-12-25 RX ADMIN — NAFCILLIN 200 GRAM(S): 10 INJECTION, POWDER, FOR SOLUTION INTRAVENOUS at 05:35

## 2021-12-25 RX ADMIN — PANTOPRAZOLE SODIUM 40 MILLIGRAM(S): 20 TABLET, DELAYED RELEASE ORAL at 05:34

## 2021-12-25 RX ADMIN — Medication 100 MILLIGRAM(S): at 05:33

## 2021-12-25 RX ADMIN — NAFCILLIN 200 GRAM(S): 10 INJECTION, POWDER, FOR SOLUTION INTRAVENOUS at 14:12

## 2021-12-25 RX ADMIN — NAFCILLIN 200 GRAM(S): 10 INJECTION, POWDER, FOR SOLUTION INTRAVENOUS at 21:05

## 2021-12-25 RX ADMIN — NAFCILLIN 200 GRAM(S): 10 INJECTION, POWDER, FOR SOLUTION INTRAVENOUS at 09:10

## 2021-12-25 NOTE — PROGRESS NOTE ADULT - PROBLEM SELECTOR PLAN 3
CT abd pelvis w/ L psoas abscess.  MRI showed psoas abscess bilaterally.  Bacteremia with Staph aureus    -IR consulted for possible psoas abscess drainage - evaluated and found abscess to be too small to drain.   -Abx as above  -F/u BCx as above.  - If pain does not improve on ABX or gets worse, will re-scan area.

## 2021-12-25 NOTE — PROGRESS NOTE ADULT - ATTENDING COMMENTS
65 yo M with pmh of DM and Hypertension, presented with back pain and found to have discitis/OM now s/p laminectomy and [facility] and transferred back to [facility] for further management. Sepsis, resolved. 2/2 OM + psoas abscess (not drained, IR consulted, but believed area was too small to drain). BCx 12/10 with MSSA and S. epidermidis. No intervention for psoas abscess. S/p laminectomy C3-C6 on 12/14. C/w nafcillin 2g q4 hours. Echo normal but did not exclude endocarditis. ID following. Will need 8 week course of antibiotics.  now COVID+, stilll febrile but not hypoxic. received mAb 12/21. blood cx 12/20 NGTD. pt will need rehab placement but has no insurance. f[facility] for [facility]. will place PICC once afebrile and close to the end of COVID quarantine period    12/24-- Continuing current mgmt. Again fevered overnight. This could be due to COVID infxn or inadequate source control of MSSA. Notably, patient is still complaining of L hip pain. Needs frequent reassessment of psoas abscess site (as this was not drained), w hopes that it will decr in size with antibiotics--> may need to be re-imaged if he continues to fever. Cont supportive mgmt of covid, currently pt is on room air.

## 2021-12-25 NOTE — PROGRESS NOTE ADULT - SUBJECTIVE AND OBJECTIVE BOX
Peter Herbert MD  [phone]/[facility] [id number]      PROGRESS NOTE:     Patient is a 66y old  Male who presents with a chief complaint of Cervical and Lumbar Osteomyelitis (24 Dec 2021 07:17)      SUBJECTIVE / OVERNIGHT EVENTS:    OVERNIGHT:       Patient examined at bedside        MEDICATIONS  (STANDING):  chlorhexidine 2% Cloths 1 Application(s) Topical daily  nafcillin  IVPB      nafcillin  IVPB 2 Gram(s) IV Intermittent every 4 hours  pantoprazole    Tablet 40 milliGRAM(s) Oral before breakfast  senna 2 Tablet(s) Oral at bedtime    MEDICATIONS  (PRN):  acetaminophen     Tablet .. 650 milliGRAM(s) Oral every 6 hours PRN Temp greater or equal to 38C (100.4F), Mild Pain (1 - 3)  cyclobenzaprine 5 milliGRAM(s) Oral every 8 hours PRN Muscle Spasm  guaiFENesin Oral Liquid (Sugar-Free) 100 milliGRAM(s) Oral every 6 hours PRN Cough      CAPILLARY BLOOD GLUCOSE        I&O's Summary    23 Dec 2021 07:01  -  24 Dec 2021 07:00  --------------------------------------------------------  IN: 2420 mL / OUT: 1050 mL / NET: 1370 mL    24 Dec 2021 07:01  -  25 Dec 2021 06:55  --------------------------------------------------------  IN: 0 mL / OUT: 850 mL / NET: -850 mL        PHYSICAL EXAM:  Vital Signs Last 24 Hrs  T(C): 37.1 (25 Dec 2021 05:30), Max: 38 (24 Dec 2021 19:35)  T(F): 98.8 (25 Dec 2021 05:30), Max: 100.4 (24 Dec 2021 19:35)  HR: 78 (25 Dec 2021 05:30) (77 - 78)  BP: 113/70 (25 Dec 2021 05:30) (113/70 - 134/72)  BP(mean): --  RR: 18 (25 Dec 2021 05:30) (18 - 18)  SpO2: 100% (25 Dec 2021 05:30) (99% - 100%)    CONSTITUTIONAL: NAD; well-developed  HEENT: PERRL, clear conjunctiva  RESPIRATORY: Normal respiratory effort; lungs are clear to auscultation bilaterally; No Crackles/Rhonchi/Wheezing  CARDIOVASCULAR: Regular rate and rhythm, normal S1 and S2, no murmur/rub/gallop; No lower extremity edema; Peripheral pulses are 2+ bilaterally  ABDOMEN: Nontender to palpation, normoactive bowel sounds, no rebound/guarding; No hepatosplenomegaly  MUSCLOSKELETAL: no clubbing or cyanosis of digits; no joint swelling or tenderness to palpation  EXTREMITY: Lower extremities Non-tender to palpation; non-erythematous B/L  Neuro: A&Ox3; no focal deficits   Psych: normal mood; Affect appropirate    LABS:                        10.4   4.84  )-----------( 810      ( 24 Dec 2021 07:42 )             31.1     12-24    137  |  100  |  9   ----------------------------<  83  3.7   |  24  |  0.74    Ca    8.1<L>      24 Dec 2021 07:42  Phos  2.6     12-24  Mg     1.90     12-24    TPro  6.7  /  Alb  2.6<L>  /  TBili  <0.2  /  DBili  x   /  AST  14  /  ALT  12  /  AlkPhos  78  12-24                RADIOLOGY & ADDITIONAL TESTS:  Results Reviewed:   Imaging Personally Reviewed:  Electrocardiogram Personally Reviewed:    COORDINATION OF CARE:  Care Discussed with Consultants/Other Providers [Y/N]:  Prior or Outpatient Records Reviewed [Y/N]:   Peter Herbert MD  [phone]/[facility] [id number]      PROGRESS NOTE:     Patient is a 66y old  Male who presents with a chief complaint of Cervical and Lumbar Osteomyelitis (24 Dec 2021 07:17)      SUBJECTIVE / OVERNIGHT EVENTS:    OVERNIGHT:   - No overnight events     Patient was seen and examined at bedside this morning. Denies any nausea/vomiting/diarrhea, headache, shortness of breath, chest pain/palpitations. Patient responding appropriately to questions and able to make needs known. Vital signs/imaging reviewed. Patients cough significantly improved on cough medication. Tylenol helps the LLQ pain.           MEDICATIONS  (STANDING):  chlorhexidine 2% Cloths 1 Application(s) Topical daily  nafcillin  IVPB      nafcillin  IVPB 2 Gram(s) IV Intermittent every 4 hours  pantoprazole    Tablet 40 milliGRAM(s) Oral before breakfast  senna 2 Tablet(s) Oral at bedtime    MEDICATIONS  (PRN):  acetaminophen     Tablet .. 650 milliGRAM(s) Oral every 6 hours PRN Temp greater or equal to 38C (100.4F), Mild Pain (1 - 3)  cyclobenzaprine 5 milliGRAM(s) Oral every 8 hours PRN Muscle Spasm  guaiFENesin Oral Liquid (Sugar-Free) 100 milliGRAM(s) Oral every 6 hours PRN Cough      CAPILLARY BLOOD GLUCOSE        I&O's Summary    23 Dec 2021 07:01  -  24 Dec 2021 07:00  --------------------------------------------------------  IN: 2420 mL / OUT: 1050 mL / NET: 1370 mL    24 Dec 2021 07:01  -  25 Dec 2021 06:55  --------------------------------------------------------  IN: 0 mL / OUT: 850 mL / NET: -850 mL        PHYSICAL EXAM:  Vital Signs Last 24 Hrs  T(C): 37.1 (25 Dec 2021 05:30), Max: 38 (24 Dec 2021 19:35)  T(F): 98.8 (25 Dec 2021 05:30), Max: 100.4 (24 Dec 2021 19:35)  HR: 78 (25 Dec 2021 05:30) (77 - 78)  BP: 113/70 (25 Dec 2021 05:30) (113/70 - 134/72)  BP(mean): --  RR: 18 (25 Dec 2021 05:30) (18 - 18)  SpO2: 100% (25 Dec 2021 05:30) (99% - 100%)    CONSTITUTIONAL: NAD; thin-appearing; Wearing C-collar   HEENT: PERRL, clear conjunctiva  RESPIRATORY: Normal respiratory effort; lungs are clear to auscultation bilaterally; No Crackles/Rhonchi/Wheezing  CARDIOVASCULAR: Regular rate and rhythm, normal S1 and S2, no murmur/rub/gallop; No lower extremity edema; Peripheral pulses are 2+ bilaterally  ABDOMEN: Tender to palpation LLQ; , normoactive bowel sounds, no rebound/guarding; No hepatosplenomegaly  MUSCLOSKELETAL: Able to ambulate   EXTREMITY: Lower extremities Non-tender to palpation; non-erythematous B/L  Neuro: A&Ox3; no focal deficits   Psych: normal mood; Affect appropirate    LABS:                        10.4   4.84  )-----------( 810      ( 24 Dec 2021 07:42 )             31.1     12-24    137  |  100  |  9   ----------------------------<  83  3.7   |  24  |  0.74    Ca    8.1<L>      24 Dec 2021 07:42  Phos  2.6     12-24  Mg     1.90     12-24    TPro  6.7  /  Alb  2.6<L>  /  TBili  <0.2  /  DBili  x   /  AST  14  /  ALT  12  /  AlkPhos  78  12-24                RADIOLOGY & ADDITIONAL TESTS:  Results Reviewed:   Imaging Personally Reviewed:  Electrocardiogram Personally Reviewed:    COORDINATION OF CARE:  Care Discussed with Consultants/Other Providers [Y/N]:  Prior or Outpatient Records Reviewed [Y/N]:

## 2021-12-26 PROCEDURE — 99233 SBSQ HOSP IP/OBS HIGH 50: CPT

## 2021-12-26 RX ADMIN — Medication 650 MILLIGRAM(S): at 01:05

## 2021-12-26 RX ADMIN — NAFCILLIN 200 GRAM(S): 10 INJECTION, POWDER, FOR SOLUTION INTRAVENOUS at 17:32

## 2021-12-26 RX ADMIN — CHLORHEXIDINE GLUCONATE 1 APPLICATION(S): 213 SOLUTION TOPICAL at 09:15

## 2021-12-26 RX ADMIN — NAFCILLIN 200 GRAM(S): 10 INJECTION, POWDER, FOR SOLUTION INTRAVENOUS at 13:27

## 2021-12-26 RX ADMIN — PANTOPRAZOLE SODIUM 40 MILLIGRAM(S): 20 TABLET, DELAYED RELEASE ORAL at 05:36

## 2021-12-26 RX ADMIN — NAFCILLIN 200 GRAM(S): 10 INJECTION, POWDER, FOR SOLUTION INTRAVENOUS at 01:01

## 2021-12-26 RX ADMIN — NAFCILLIN 200 GRAM(S): 10 INJECTION, POWDER, FOR SOLUTION INTRAVENOUS at 21:11

## 2021-12-26 RX ADMIN — NAFCILLIN 200 GRAM(S): 10 INJECTION, POWDER, FOR SOLUTION INTRAVENOUS at 09:07

## 2021-12-26 RX ADMIN — Medication 650 MILLIGRAM(S): at 02:05

## 2021-12-26 RX ADMIN — NAFCILLIN 200 GRAM(S): 10 INJECTION, POWDER, FOR SOLUTION INTRAVENOUS at 05:37

## 2021-12-26 NOTE — PROGRESS NOTE ADULT - ATTENDING COMMENTS
65 yo M with pmh of DM and Hypertension, presented with back pain and found to have discitis/OM now s/p laminectomy and [facility] and transferred back to [facility] for further management. Sepsis, resolved. 2/2 OM + psoas abscess (not drained, IR consulted, but believed area was too small to drain). BCx 12/10 with MSSA and S. epidermidis. No intervention for psoas abscess. S/p laminectomy C3-C6 on 12/14. C/w nafcillin 2g q4 hours. Echo normal but did not exclude endocarditis. ID following. Will need 8 week course of antibiotics.  now COVID+, stilll febrile but not hypoxic. received mAb 12/21. blood cx 12/20 NGTD. pt will need rehab placement but has no insurance. [facility]. will place PICC once afebrile and close to the end of COVID quarantine period--> ideally 12/27 12/26-- Continuing current mgmt. Afebrile overnight. (Last fevered evening of 12/24). Notably, patient is still complaining of L hip pain. Needs frequent reassessment of psoas abscess site (as this was not drained), w hopes that it will decr in size with antibiotics--> may need to be re-imaged if he continues to fever. Cont supportive mgmt of covid, currently pt is on room air. D/w ortho regarding rec's for duration of C-collar.

## 2021-12-26 NOTE — PROGRESS NOTE ADULT - PROBLEM SELECTOR PLAN 1
COVID +, Patient on RA with dry cough. Not experiecing SOB, or CP.  S/P Casirivimab/imdevimab  - On isolation/droplet precautions   - Monitor symptoms   - Cough medicine PRN  - Tylenol prn COVID +, Patient on RA with dry cough. Not experiencing SOB, or CP.  S/P Casirivimab/imdevimab  - On isolation/droplet precautions   - Monitor symptoms   - Cough medicine PRN  - Tylenol prn

## 2021-12-26 NOTE — PROGRESS NOTE ADULT - PROBLEM SELECTOR PLAN 2
MRI w/ discitis/ osteomyelitis in C4/C5 with prevertebral/ phlegmon w/ concern for developing abscess extending from C2 to C6 levels and discitis/ osteomyelitis in L3/L4 level and an extensive paravertebral phlegmon across L3-4.  Now s/p C3-C6 laminectomy w/ PSF by ortho spine.   - Continue Nafcillin 2g Q4hrs - patient will need this for 8 weeks per ID from ( 12/14 - )   - Will need PICC day before discharge  - Will need to be afebrile 24Hours before PICC   - F/U Blood culture 12/20 - Prelim results NGTD   - Activity as tolerated.  - Incentive spirometer MRI w/ discitis/ osteomyelitis in C4/C5 with prevertebral/ phlegmon w/ concern for developing abscess extending from C2 to C6 levels and discitis/ osteomyelitis in L3/L4 level and an extensive paravertebral phlegmon across L3-4.  Now s/p C3-C6 laminectomy w/ PSF by ortho spine.   - Continue Nafcillin 2g Q4hrs - patient will need this for 8 weeks per ID from ( 12/14 - )   - Will need PICC day before discharge  - Will need to be afebrile 24Hours before PICC   - F/U Blood culture 12/20 - Prelim results NGTD   - Activity as tolerated.  - Incentive spirometer  - Discussed with ortho when to remove C-collar, will keep for now and follow up with note

## 2021-12-26 NOTE — PROGRESS NOTE ADULT - SUBJECTIVE AND OBJECTIVE BOX
***INCOMPLETE NOTE***  Otf Xiong MD PGY-3  Internal Medicine  Pager [phone] / [phone]  After 7pm please page Night Float [phone] or [phone]    Patient is a 66y old  Male who presents with a chief complaint of Cervical and Lumbar Osteomyelitis (25 Dec 2021 06:54)      SUBJECTIVE / OVERNIGHT EVENTS:    MEDICATIONS  (STANDING):  chlorhexidine 2% Cloths 1 Application(s) Topical daily  nafcillin  IVPB      nafcillin  IVPB 2 Gram(s) IV Intermittent every 4 hours  pantoprazole    Tablet 40 milliGRAM(s) Oral before breakfast  senna 2 Tablet(s) Oral at bedtime    MEDICATIONS  (PRN):  acetaminophen     Tablet .. 650 milliGRAM(s) Oral every 6 hours PRN Temp greater or equal to 38C (100.4F), Mild Pain (1 - 3)  cyclobenzaprine 5 milliGRAM(s) Oral every 8 hours PRN Muscle Spasm  guaiFENesin Oral Liquid (Sugar-Free) 100 milliGRAM(s) Oral every 6 hours PRN Cough      CAPILLARY BLOOD GLUCOSE        I&O's Summary    25 Dec 2021 07:01  -  26 Dec 2021 07:00  --------------------------------------------------------  IN: 250 mL / OUT: 1150 mL / NET: -900 mL        PHYSICAL EXAM:  Vital Signs Last 24 Hrs  T(C): 37.2 (12-26-21 @ 05:30)  T(F): 98.9 (12-26-21 @ 05:30), Max: 99.8 (12-25-21 @ 21:00)  HR: 83 (12-26-21 @ 05:30) (82 - 83)  BP: 121/64 (12-26-21 @ 05:30)  BP(mean): --  RR: 18 (12-26-21 @ 05:30) (18 - 18)  SpO2: 100% (12-26-21 @ 05:30) (100% - 100%)  Wt(kg): --    12-25 @ 07:01  -  12-26 @ 07:00  --------------------------------------------------------  IN: 250 mL / OUT: 1150 mL / NET: -900 mL      Constitutional: NAD, awake and alert  EYES: EOMI  ENT:  Normal Hearing, no tonsillar exudates   Neck: Soft and supple , no thyromegaly   Respiratory: Breath sounds are clear bilaterally, No wheezing, rales or rhonchi  Cardiovascular: S1 and S2, regular rate and rhythm, no Murmurs, gallops or rubs, no JVD,    Gastrointestinal: Bowel Sounds present, soft, nontender, nondistended, no guarding, no rebound  Extremities: No cyanosis or clubbing; warm to touch  Vascular: 2+ peripheral pulses lower ex  Neurological: No focal deficits, CN II-XII intact bilaterally, sensation to light touch intact in all extremities, gait intact. Pupils are equally reactive to light and symmetrical in size.   Musculoskeletal: 5/5 strength b/l upper and lower extremities; no joint swelling.  Skin: No rashes  Psych: no depression or anhedonia, AAOx3  HEME: no bruises, no nose bleeds      LABS:                        10.8   4.73  )-----------( 794      ( 25 Dec 2021 08:14 )             31.3     12-25    136  |  100  |  10  ----------------------------<  111<H>  3.6   |  25  |  0.75    Ca    8.3<L>      25 Dec 2021 08:14  Phos  2.3     12-25  Mg     1.90     12-25    TPro  6.7  /  Alb  2.7<L>  /  TBili  0.3  /  DBili  x   /  AST  15  /  ALT  11  /  AlkPhos  69  12-25              RADIOLOGY & ADDITIONAL TESTS:    Imaging Personally Reviewed:    Consultant(s) Notes Reviewed:      Care Discussed with Consultants/Other Providers:   Otf Xiong MD PGY-3  Internal Medicine  Pager [phone] / [phone]  After 7pm please page Night Float [phone] or [phone]    Patient is a 66y old  Male who presents with a chief complaint of Cervical and Lumbar Osteomyelitis (25 Dec 2021 06:54)    SUBJECTIVE / OVERNIGHT EVENTS:  No acute events overnight. Continues to complain of L hip pain. Reports he overall feels he is improving.   Denies nausea, vomiting, constipation, diarrhea, fevers, chills, chest pain, SOB.    MEDICATIONS  (STANDING):  chlorhexidine 2% Cloths 1 Application(s) Topical daily  nafcillin  IVPB      nafcillin  IVPB 2 Gram(s) IV Intermittent every 4 hours  pantoprazole    Tablet 40 milliGRAM(s) Oral before breakfast  senna 2 Tablet(s) Oral at bedtime    MEDICATIONS  (PRN):  acetaminophen     Tablet .. 650 milliGRAM(s) Oral every 6 hours PRN Temp greater or equal to 38C (100.4F), Mild Pain (1 - 3)  cyclobenzaprine 5 milliGRAM(s) Oral every 8 hours PRN Muscle Spasm  guaiFENesin Oral Liquid (Sugar-Free) 100 milliGRAM(s) Oral every 6 hours PRN Cough      CAPILLARY BLOOD GLUCOSE        I&O's Summary    25 Dec 2021 07:01  -  26 Dec 2021 07:00  --------------------------------------------------------  IN: 250 mL / OUT: 1150 mL / NET: -900 mL        PHYSICAL EXAM:  Vital Signs Last 24 Hrs  T(C): 37.2 (12-26-21 @ 05:30)  T(F): 98.9 (12-26-21 @ 05:30), Max: 99.8 (12-25-21 @ 21:00)  HR: 83 (12-26-21 @ 05:30) (82 - 83)  BP: 121/64 (12-26-21 @ 05:30)  BP(mean): --  RR: 18 (12-26-21 @ 05:30) (18 - 18)  SpO2: 100% (12-26-21 @ 05:30) (100% - 100%)  Wt(kg): --    12-25 @ 07:01  -  12-26 @ 07:00  --------------------------------------------------------  IN: 250 mL / OUT: 1150 mL / NET: -900 mL      CONSTITUTIONAL: NAD; thin-appearing; Wearing C-collar   HEENT: PERRL, clear conjunctiva  RESPIRATORY: Normal respiratory effort; lungs are clear to auscultation bilaterally; No Crackles/Rhonchi/Wheezing  CARDIOVASCULAR: Regular rate and rhythm, normal S1 and S2, no murmur/rub/gallop; No lower extremity edema; Peripheral pulses are 2+ bilaterally  ABDOMEN: Nontender to palpation; normoactive bowel sounds, no rebound/guarding; No hepatosplenomegaly  MUSCLOSKELETAL: 5/5 strength in all 4 extremities  EXTREMITY: Lower extremities Non-tender to palpation; non-erythematous B/L  Neuro: A&Ox3; no focal deficits   Psych: normal mood; Affect appropirate    LABS:                        10.8   4.73  )-----------( 794      ( 25 Dec 2021 08:14 )             31.3     12-25    136  |  100  |  10  ----------------------------<  111<H>  3.6   |  25  |  0.75    Ca    8.3<L>      25 Dec 2021 08:14  Phos  2.3     12-25  Mg     1.90     12-25    TPro  6.7  /  Alb  2.7<L>  /  TBili  0.3  /  DBili  x   /  AST  15  /  ALT  11  /  AlkPhos  69  12-25      RADIOLOGY & ADDITIONAL TESTS:    Imaging Personally Reviewed:    Consultant(s) Notes Reviewed:      Care Discussed with Consultants/Other Providers:

## 2021-12-27 LAB
ALBUMIN SERPL ELPH-MCNC: 2.7 G/DL — LOW (ref 3.3–5)
ALP SERPL-CCNC: 69 U/L — SIGNIFICANT CHANGE UP (ref 40–120)
ALT FLD-CCNC: 10 U/L — SIGNIFICANT CHANGE UP (ref 4–41)
ANION GAP SERPL CALC-SCNC: 11 MMOL/L — SIGNIFICANT CHANGE UP (ref 7–14)
AST SERPL-CCNC: 14 U/L — SIGNIFICANT CHANGE UP (ref 4–40)
BASOPHILS # BLD AUTO: 0.02 K/UL — SIGNIFICANT CHANGE UP (ref 0–0.2)
BASOPHILS NFR BLD AUTO: 0.2 % — SIGNIFICANT CHANGE UP (ref 0–2)
BILIRUB SERPL-MCNC: 0.2 MG/DL — SIGNIFICANT CHANGE UP (ref 0.2–1.2)
BUN SERPL-MCNC: 12 MG/DL — SIGNIFICANT CHANGE UP (ref 7–23)
CALCIUM SERPL-MCNC: 8.5 MG/DL — SIGNIFICANT CHANGE UP (ref 8.4–10.5)
CHLORIDE SERPL-SCNC: 99 MMOL/L — SIGNIFICANT CHANGE UP (ref 98–107)
CO2 SERPL-SCNC: 25 MMOL/L — SIGNIFICANT CHANGE UP (ref 22–31)
CREAT SERPL-MCNC: 0.72 MG/DL — SIGNIFICANT CHANGE UP (ref 0.5–1.3)
EOSINOPHIL # BLD AUTO: 0.43 K/UL — SIGNIFICANT CHANGE UP (ref 0–0.5)
EOSINOPHIL NFR BLD AUTO: 5.1 % — SIGNIFICANT CHANGE UP (ref 0–6)
GLUCOSE SERPL-MCNC: 95 MG/DL — SIGNIFICANT CHANGE UP (ref 70–99)
HCT VFR BLD CALC: 28.7 % — LOW (ref 39–50)
HGB BLD-MCNC: 10 G/DL — LOW (ref 13–17)
IANC: 4.61 K/UL — SIGNIFICANT CHANGE UP (ref 1.5–8.5)
IMM GRANULOCYTES NFR BLD AUTO: 0.9 % — SIGNIFICANT CHANGE UP (ref 0–1.5)
LYMPHOCYTES # BLD AUTO: 2.6 K/UL — SIGNIFICANT CHANGE UP (ref 1–3.3)
LYMPHOCYTES # BLD AUTO: 30.7 % — SIGNIFICANT CHANGE UP (ref 13–44)
MAGNESIUM SERPL-MCNC: 1.8 MG/DL — SIGNIFICANT CHANGE UP (ref 1.6–2.6)
MCHC RBC-ENTMCNC: 31.2 PG — SIGNIFICANT CHANGE UP (ref 27–34)
MCHC RBC-ENTMCNC: 34.8 GM/DL — SIGNIFICANT CHANGE UP (ref 32–36)
MCV RBC AUTO: 89.4 FL — SIGNIFICANT CHANGE UP (ref 80–100)
MONOCYTES # BLD AUTO: 0.74 K/UL — SIGNIFICANT CHANGE UP (ref 0–0.9)
MONOCYTES NFR BLD AUTO: 8.7 % — SIGNIFICANT CHANGE UP (ref 2–14)
NEUTROPHILS # BLD AUTO: 4.61 K/UL — SIGNIFICANT CHANGE UP (ref 1.8–7.4)
NEUTROPHILS NFR BLD AUTO: 54.4 % — SIGNIFICANT CHANGE UP (ref 43–77)
NRBC # BLD: 0 /100 WBCS — SIGNIFICANT CHANGE UP
NRBC # FLD: 0 K/UL — SIGNIFICANT CHANGE UP
PHOSPHATE SERPL-MCNC: 2.4 MG/DL — LOW (ref 2.5–4.5)
PLATELET # BLD AUTO: 770 K/UL — HIGH (ref 150–400)
POTASSIUM SERPL-MCNC: 3.6 MMOL/L — SIGNIFICANT CHANGE UP (ref 3.5–5.3)
POTASSIUM SERPL-SCNC: 3.6 MMOL/L — SIGNIFICANT CHANGE UP (ref 3.5–5.3)
PROT SERPL-MCNC: 6.7 G/DL — SIGNIFICANT CHANGE UP (ref 6–8.3)
RBC # BLD: 3.21 M/UL — LOW (ref 4.2–5.8)
RBC # FLD: 17 % — HIGH (ref 10.3–14.5)
SODIUM SERPL-SCNC: 135 MMOL/L — SIGNIFICANT CHANGE UP (ref 135–145)
WBC # BLD: 8.48 K/UL — SIGNIFICANT CHANGE UP (ref 3.8–10.5)
WBC # FLD AUTO: 8.48 K/UL — SIGNIFICANT CHANGE UP (ref 3.8–10.5)

## 2021-12-27 PROCEDURE — 99232 SBSQ HOSP IP/OBS MODERATE 35: CPT

## 2021-12-27 PROCEDURE — 99232 SBSQ HOSP IP/OBS MODERATE 35: CPT | Mod: GC

## 2021-12-27 RX ORDER — INFLUENZA VIRUS VACCINE 15; 15; 15; 15 UG/.5ML; UG/.5ML; UG/.5ML; UG/.5ML
0.7 SUSPENSION INTRAMUSCULAR ONCE
Refills: 0 | Status: COMPLETED | OUTPATIENT
Start: 2021-12-27 | End: 2022-01-13

## 2021-12-27 RX ORDER — POTASSIUM PHOSPHATE, MONOBASIC POTASSIUM PHOSPHATE, DIBASIC 236; 224 MG/ML; MG/ML
15 INJECTION, SOLUTION INTRAVENOUS ONCE
Refills: 0 | Status: COMPLETED | OUTPATIENT
Start: 2021-12-27 | End: 2021-12-27

## 2021-12-27 RX ADMIN — NAFCILLIN 200 GRAM(S): 10 INJECTION, POWDER, FOR SOLUTION INTRAVENOUS at 12:26

## 2021-12-27 RX ADMIN — NAFCILLIN 200 GRAM(S): 10 INJECTION, POWDER, FOR SOLUTION INTRAVENOUS at 03:22

## 2021-12-27 RX ADMIN — CHLORHEXIDINE GLUCONATE 1 APPLICATION(S): 213 SOLUTION TOPICAL at 12:46

## 2021-12-27 RX ADMIN — Medication 650 MILLIGRAM(S): at 09:19

## 2021-12-27 RX ADMIN — Medication 650 MILLIGRAM(S): at 08:43

## 2021-12-27 RX ADMIN — NAFCILLIN 200 GRAM(S): 10 INJECTION, POWDER, FOR SOLUTION INTRAVENOUS at 17:10

## 2021-12-27 RX ADMIN — NAFCILLIN 200 GRAM(S): 10 INJECTION, POWDER, FOR SOLUTION INTRAVENOUS at 22:29

## 2021-12-27 RX ADMIN — PANTOPRAZOLE SODIUM 40 MILLIGRAM(S): 20 TABLET, DELAYED RELEASE ORAL at 06:33

## 2021-12-27 RX ADMIN — POTASSIUM PHOSPHATE, MONOBASIC POTASSIUM PHOSPHATE, DIBASIC 62.5 MILLIMOLE(S): 236; 224 INJECTION, SOLUTION INTRAVENOUS at 08:33

## 2021-12-27 RX ADMIN — NAFCILLIN 200 GRAM(S): 10 INJECTION, POWDER, FOR SOLUTION INTRAVENOUS at 06:33

## 2021-12-27 NOTE — CHART NOTE - NSCHARTNOTEFT_GEN_A_CORE
Spoke with attending Dr. Lara. He wants the patient to remain in the cervical collar and figure of 8 brace until 1/11/22, post-op day 14.     Ortho  p [phone] Spoke with attending Dr. Lara. He wants the patient to remain in the cervical collar and figure of 8 brace until 1/11/22, 4 weeks post-op    Ortho  p [phone]

## 2021-12-27 NOTE — PROGRESS NOTE ADULT - PROBLEM SELECTOR PLAN 1
COVID +, Patient on RA with dry cough. Not experiencing SOB, or CP.  S/P Casirivimab/imdevimab  - On isolation/droplet precautions   - Monitor symptoms   - Cough medicine PRN  - Tylenol prn

## 2021-12-27 NOTE — PROGRESS NOTE ADULT - SUBJECTIVE AND OBJECTIVE BOX
Follow Up: MSSA spine    Interval History/ROS: Afebrile. Pain is still there. No diarrhea.     Allergies  No Known Allergies        ANTIMICROBIALS:  nafcillin  IVPB    nafcillin  IVPB 2 every 4 hours      OTHER MEDS:  acetaminophen     Tablet .. 650 milliGRAM(s) Oral every 6 hours PRN  chlorhexidine 2% Cloths 1 Application(s) Topical daily  cyclobenzaprine 5 milliGRAM(s) Oral every 8 hours PRN  guaiFENesin Oral Liquid (Sugar-Free) 100 milliGRAM(s) Oral every 6 hours PRN  pantoprazole    Tablet 40 milliGRAM(s) Oral before breakfast  senna 2 Tablet(s) Oral at bedtime      Vital Signs Last 24 Hrs  T(C): 36.4 (27 Dec 2021 12:00), Max: 37.4 (26 Dec 2021 21:11)  T(F): 97.6 (27 Dec 2021 12:00), Max: 99.4 (26 Dec 2021 21:11)  HR: 95 (27 Dec 2021 12:00) (75 - 95)  BP: 111/79 (27 Dec 2021 12:00) (111/79 - 128/71)  BP(mean): --  RR: 18 (27 Dec 2021 12:00) (18 - 18)  SpO2: 100% (27 Dec 2021 12:00) (98% - 100%)    Physical Exam:  General: awake, alert, non toxic, underweight   Head: atraumatic, normocephalic  Cardio: regular rate   Respiratory: nonlabored on room air  abd: soft, no tenderness  Musculoskeletal: neck collar   psych: normal affect                          10.0   8.48  )-----------( 770      ( 27 Dec 2021 07:27 )             28.7       12-27    135  |  99  |  12  ----------------------------<  95  3.6   |  25  |  0.72    Ca    8.5      27 Dec 2021 07:27  Phos  2.4     12-27  Mg     1.80     12-27    TPro  6.7  /  Alb  2.7<L>  /  TBili  0.2  /  DBili  x   /  AST  14  /  ALT  10  /  AlkPhos  69  12-27          MICROBIOLOGY:        RADIOLOGY:  Images below reviewed personally  MR Lumbar Spine w/ IV Cont (12.12.21 @ 21:40)   1.  Discitis/osteomyelitis at C4-C5, with adjacent thin (4 mm) right   epidural fluid collection resulting in mild to moderate spinal canal   stenosis, as detailed above.  2.  Large prevertebral phlegmon and/or early developing abscess extending   from C2 to C6.  3.  Cervical spondylosis, as detailed above.  THORACIC SPINE:  1.  No suggestion of discitis/osteomyelitis, epidural fluid collection,   within spinal canal stenosis or disc herniation within the thoracic spine.  2.  Small bilateral pleural effusions, right greater than left.  LUMBAR SPINE:  1.  Discitis/osteomyelitis at L3-L4, with adjacent trace epidural   phlegmon without discrete appreciable epidural fluid collection.  2.  Bilateral psoas muscle abscesses and extensive surrounding   paravertebral phlegmon adjacent to the setting of osteomyelitis at L3-L4.  3.  Lumbar spondylosis, most significant for severe spinal canal stenosis   at L4-L5 and multilevel moderate to severe neuroforaminal narrowing, as   detailed above.

## 2021-12-27 NOTE — PROGRESS NOTE ADULT - PROBLEM SELECTOR PLAN 2
MRI w/ discitis/ osteomyelitis in C4/C5 with prevertebral/ phlegmon w/ concern for developing abscess extending from C2 to C6 levels and discitis/ osteomyelitis in L3/L4 level and an extensive paravertebral phlegmon across L3-4.  Now s/p C3-C6 laminectomy w/ PSF by ortho spine.   - Continue Nafcillin 2g Q4hrs - patient will need this for 8 weeks per ID from ( 12/14 - )   - Will need PICC day before discharge  - Will need to be afebrile 24Hours before PICC   - F/U Blood culture 12/20 - Prelim results NGTD   - Activity as tolerated.  - Incentive spirometer  - Discussed with ortho when to remove C-collar, will keep for now and follow up with note MRI w/ discitis/ osteomyelitis in C4/C5 with prevertebral/ phlegmon w/ concern for developing abscess extending from C2 to C6 levels and discitis/ osteomyelitis in L3/L4 level and an extensive paravertebral phlegmon across L3-4.  Now s/p C3-C6 laminectomy w/ PSF by ortho spine.   - Continue Nafcillin 2g Q4hrs - patient will need this for 8 weeks per ID from ( 12/14 - )   - Will need PICC day before discharge  - Will need to be afebrile 24Hours before PICC   - Activity as tolerated.  - Incentive spirometer  - Discussed with ortho when to remove C-collar, will keep for now and follow up with note

## 2021-12-27 NOTE — PATIENT PROFILE ADULT - FALL HARM RISK - HARM RISK INTERVENTIONS

## 2021-12-27 NOTE — PROGRESS NOTE ADULT - SUBJECTIVE AND OBJECTIVE BOX
Shabbir Denson MD, MPH, PGY2  Pager [phone]  *Please page Night Float after 7 PM*    PROGRESS NOTE:     Patient is a 66y old  Male who presents with a chief complaint of Cervical and Lumbar Osteomyelitis (26 Dec 2021 07:30)      SUBJECTIVE / OVERNIGHT EVENTS:    REVIEW OF SYSTEMS:    CONSTITUTIONAL: No weakness, fevers or chills  EYES/ENT: No visual changes;  No vertigo or throat pain   NECK: No pain or stiffness  RESPIRATORY: No cough, wheezing, hemoptysis; No shortness of breath  CARDIOVASCULAR: No chest pain or palpitations  GASTROINTESTINAL: No abdominal or epigastric pain. No nausea, vomiting, or hematemesis; No diarrhea or constipation. No melena or hematochezia.  GENITOURINARY: No dysuria, frequency or hematuria  NEUROLOGICAL: No numbness or weakness  SKIN: No itching, rashes      MEDICATIONS  (STANDING):  chlorhexidine 2% Cloths 1 Application(s) Topical daily  nafcillin  IVPB      nafcillin  IVPB 2 Gram(s) IV Intermittent every 4 hours  pantoprazole    Tablet 40 milliGRAM(s) Oral before breakfast  senna 2 Tablet(s) Oral at bedtime    MEDICATIONS  (PRN):  acetaminophen     Tablet .. 650 milliGRAM(s) Oral every 6 hours PRN Temp greater or equal to 38C (100.4F), Mild Pain (1 - 3)  cyclobenzaprine 5 milliGRAM(s) Oral every 8 hours PRN Muscle Spasm  guaiFENesin Oral Liquid (Sugar-Free) 100 milliGRAM(s) Oral every 6 hours PRN Cough      CAPILLARY BLOOD GLUCOSE        I&O's Summary    26 Dec 2021 07:01  -  27 Dec 2021 07:00  --------------------------------------------------------  IN: 670 mL / OUT: 1500 mL / NET: -830 mL        PHYSICAL EXAM:  Vital Signs Last 24 Hrs  T(C): 37.3 (27 Dec 2021 06:46), Max: 37.4 (26 Dec 2021 21:11)  T(F): 99.1 (27 Dec 2021 06:46), Max: 99.4 (26 Dec 2021 21:11)  HR: 75 (27 Dec 2021 06:46) (75 - 83)  BP: 123/74 (27 Dec 2021 06:46) (122/78 - 128/71)  BP(mean): --  RR: 18 (27 Dec 2021 06:46) (17 - 18)  SpO2: 98% (27 Dec 2021 06:46) (98% - 100%)    CONSTITUTIONAL: NAD, well-developed  RESPIRATORY: Normal respiratory effort; lungs are clear to auscultation bilaterally  CARDIOVASCULAR: Regular rate and rhythm, normal S1 and S2, no murmur/rub/gallop; No lower extremity edema; Peripheral pulses are 2+ bilaterally  ABDOMEN: Nontender to palpation, normoactive bowel sounds, no rebound/guarding; No hepatosplenomegaly  MUSCLOSKELETAL: no clubbing or cyanosis of digits; no joint swelling or tenderness to palpation  PSYCH: A+O to person, place, and time; affect appropriate    LABS:                        10.0   8.48  )-----------( 770      ( 27 Dec 2021 07:27 )             28.7     12-25    136  |  100  |  10  ----------------------------<  111<H>  3.6   |  25  |  0.75    Ca    8.3<L>      25 Dec 2021 08:14  Phos  2.3     12-25  Mg     1.90     12-25    TPro  6.7  /  Alb  2.7<L>  /  TBili  0.3  /  DBili  x   /  AST  15  /  ALT  11  /  AlkPhos  69  12-25                RADIOLOGY & ADDITIONAL TESTS:  Results Reviewed:   Imaging Personally Reviewed:  Electrocardiogram Personally Reviewed:    COORDINATION OF CARE:  Care Discussed with Consultants/Other Providers [Y/N]:  Prior or Outpatient Records Reviewed [Y/N]:   Shabbir Denson MD, MPH, PGY2  Pager [phone]  *Please page Night Float after 7 PM*    PROGRESS NOTE:     Patient is a 66y old  Male who presents with a chief complaint of Cervical and Lumbar Osteomyelitis (26 Dec 2021 07:30)      SUBJECTIVE / OVERNIGHT EVENTS: No acute events overnight, patient afebrile and hemodynamically stable. Continues to have left hip pain. Denies CP, n/v/c/d, fevers, chills, abdominal pain.    MEDICATIONS  (STANDING):  chlorhexidine 2% Cloths 1 Application(s) Topical daily  nafcillin  IVPB      nafcillin  IVPB 2 Gram(s) IV Intermittent every 4 hours  pantoprazole    Tablet 40 milliGRAM(s) Oral before breakfast  senna 2 Tablet(s) Oral at bedtime    MEDICATIONS  (PRN):  acetaminophen     Tablet .. 650 milliGRAM(s) Oral every 6 hours PRN Temp greater or equal to 38C (100.4F), Mild Pain (1 - 3)  cyclobenzaprine 5 milliGRAM(s) Oral every 8 hours PRN Muscle Spasm  guaiFENesin Oral Liquid (Sugar-Free) 100 milliGRAM(s) Oral every 6 hours PRN Cough      CAPILLARY BLOOD GLUCOSE        I&O's Summary    26 Dec 2021 07:01  -  27 Dec 2021 07:00  --------------------------------------------------------  IN: 670 mL / OUT: 1500 mL / NET: -830 mL        PHYSICAL EXAM:  Vital Signs Last 24 Hrs  T(C): 37.3 (27 Dec 2021 06:46), Max: 37.4 (26 Dec 2021 21:11)  T(F): 99.1 (27 Dec 2021 06:46), Max: 99.4 (26 Dec 2021 21:11)  HR: 75 (27 Dec 2021 06:46) (75 - 83)  BP: 123/74 (27 Dec 2021 06:46) (122/78 - 128/71)  BP(mean): --  RR: 18 (27 Dec 2021 06:46) (17 - 18)  SpO2: 98% (27 Dec 2021 06:46) (98% - 100%)    CONSTITUTIONAL: NAD; thin-appearing; Wearing C-collar   HEENT: PERRL, clear conjunctiva  RESPIRATORY: Normal respiratory effort; lungs are clear to auscultation bilaterally; No Crackles/Rhonchi/Wheezing  CARDIOVASCULAR: Regular rate and rhythm, normal S1 and S2, no murmur/rub/gallop; No lower extremity edema; Peripheral pulses are 2+ bilaterally  ABDOMEN: L hip TTP. Abdomen nontender to palpation; normoactive bowel sounds, no rebound/guarding; No hepatosplenomegaly  MUSCLOSKELETAL: 5/5 strength in all 4 extremities  EXTREMITY: Lower extremities Non-tender to palpation; non-erythematous B/L  Neuro: A&Ox3; no focal deficits   Psych: normal mood; Affect appropirate    LABS:                        10.0   8.48  )-----------( 770      ( 27 Dec 2021 07:27 )             28.7     12-25    136  |  100  |  10  ----------------------------<  111<H>  3.6   |  25  |  0.75    Ca    8.3<L>      25 Dec 2021 08:14  Phos  2.3     12-25  Mg     1.90     12-25    TPro  6.7  /  Alb  2.7<L>  /  TBili  0.3  /  DBili  x   /  AST  15  /  ALT  11  /  AlkPhos  69  12-25                RADIOLOGY & ADDITIONAL TESTS:  Results Reviewed:   Imaging Personally Reviewed:  Electrocardiogram Personally Reviewed:    COORDINATION OF CARE:  Care Discussed with Consultants/Other Providers [Y/N]:  Prior or Outpatient Records Reviewed [Y/N]:

## 2021-12-27 NOTE — PROGRESS NOTE ADULT - ASSESSMENT
Neck and back pain with paresthesias since 11/15.   MSSA bacteremia 12/10 with vertebral osteomyelitis, cervical and lumbar, phlegmon, psoas abscesses.   Blood cultures cleared 12/12 without evidence of IE on TTE 12/14.   Cervical decompression laminectomy 12/14.   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   Clinically well.     Suggest  -although his lack of insurance may be an issue, he needs IV antibiotics, 8 weeks of Nafcillin as planned, can use continuous infusion outpatient  -no indication for further COVID therapy at this time      Discussed with medicine   Dr Cameron will assume care starting tomorrow     Markell Aguayo MD   Infectious Disease   Pager [phone]   After 5PM and on weekends please page fellow on call or call [phone]

## 2021-12-27 NOTE — PROGRESS NOTE ADULT - ATTENDING COMMENTS
67 yo M with pmh of DM and Hypertension, presented with back pain and found to have discitis/OM now s/p laminectomy and [facility] and transferred back to [facility] for further management. Sepsis, resolved. 2/2 OM + psoas abscess. BCx 12/10 with MSSA and S. epidermidis. No intervention for psoas abscess. S/p laminectomy C3-C6 on 12/14. C/w nafcillin 2g q4 hours. Echo normal but did not exclude endocarditis. ID following. Will need 8 week course of antibiotics.  now COVID+, received mAb 12/21. blood cx 12/20 NGTD. Repeat CT a/p ordered to re-evaluate psaos abscess as pain increased. PT now rec home with outpt PT- will discuss with ID home abx regimen and CM for possible sher home abx infusion given lack of insurance.

## 2021-12-28 LAB
ALBUMIN SERPL ELPH-MCNC: 2.8 G/DL — LOW (ref 3.3–5)
ALP SERPL-CCNC: 70 U/L — SIGNIFICANT CHANGE UP (ref 40–120)
ALT FLD-CCNC: 12 U/L — SIGNIFICANT CHANGE UP (ref 4–41)
ANION GAP SERPL CALC-SCNC: 9 MMOL/L — SIGNIFICANT CHANGE UP (ref 7–14)
AST SERPL-CCNC: 17 U/L — SIGNIFICANT CHANGE UP (ref 4–40)
BASOPHILS # BLD AUTO: 0.02 K/UL — SIGNIFICANT CHANGE UP (ref 0–0.2)
BASOPHILS NFR BLD AUTO: 0.3 % — SIGNIFICANT CHANGE UP (ref 0–2)
BILIRUB SERPL-MCNC: 0.2 MG/DL — SIGNIFICANT CHANGE UP (ref 0.2–1.2)
BUN SERPL-MCNC: 10 MG/DL — SIGNIFICANT CHANGE UP (ref 7–23)
CALCIUM SERPL-MCNC: 8.6 MG/DL — SIGNIFICANT CHANGE UP (ref 8.4–10.5)
CHLORIDE SERPL-SCNC: 98 MMOL/L — SIGNIFICANT CHANGE UP (ref 98–107)
CO2 SERPL-SCNC: 27 MMOL/L — SIGNIFICANT CHANGE UP (ref 22–31)
CREAT SERPL-MCNC: 0.63 MG/DL — SIGNIFICANT CHANGE UP (ref 0.5–1.3)
EOSINOPHIL # BLD AUTO: 0.34 K/UL — SIGNIFICANT CHANGE UP (ref 0–0.5)
EOSINOPHIL NFR BLD AUTO: 4.8 % — SIGNIFICANT CHANGE UP (ref 0–6)
GLUCOSE SERPL-MCNC: 106 MG/DL — HIGH (ref 70–99)
HCT VFR BLD CALC: 30.4 % — LOW (ref 39–50)
HGB BLD-MCNC: 10 G/DL — LOW (ref 13–17)
IANC: 3.78 K/UL — SIGNIFICANT CHANGE UP (ref 1.5–8.5)
IMM GRANULOCYTES NFR BLD AUTO: 1.1 % — SIGNIFICANT CHANGE UP (ref 0–1.5)
LYMPHOCYTES # BLD AUTO: 2.11 K/UL — SIGNIFICANT CHANGE UP (ref 1–3.3)
LYMPHOCYTES # BLD AUTO: 30.1 % — SIGNIFICANT CHANGE UP (ref 13–44)
MAGNESIUM SERPL-MCNC: 1.8 MG/DL — SIGNIFICANT CHANGE UP (ref 1.6–2.6)
MCHC RBC-ENTMCNC: 29.5 PG — SIGNIFICANT CHANGE UP (ref 27–34)
MCHC RBC-ENTMCNC: 32.9 GM/DL — SIGNIFICANT CHANGE UP (ref 32–36)
MCV RBC AUTO: 89.7 FL — SIGNIFICANT CHANGE UP (ref 80–100)
MONOCYTES # BLD AUTO: 0.69 K/UL — SIGNIFICANT CHANGE UP (ref 0–0.9)
MONOCYTES NFR BLD AUTO: 9.8 % — SIGNIFICANT CHANGE UP (ref 2–14)
NEUTROPHILS # BLD AUTO: 3.78 K/UL — SIGNIFICANT CHANGE UP (ref 1.8–7.4)
NEUTROPHILS NFR BLD AUTO: 53.9 % — SIGNIFICANT CHANGE UP (ref 43–77)
NRBC # BLD: 0 /100 WBCS — SIGNIFICANT CHANGE UP
NRBC # FLD: 0 K/UL — SIGNIFICANT CHANGE UP
PHOSPHATE SERPL-MCNC: 2.7 MG/DL — SIGNIFICANT CHANGE UP (ref 2.5–4.5)
PLATELET # BLD AUTO: 702 K/UL — HIGH (ref 150–400)
POTASSIUM SERPL-MCNC: 3.5 MMOL/L — SIGNIFICANT CHANGE UP (ref 3.5–5.3)
POTASSIUM SERPL-SCNC: 3.5 MMOL/L — SIGNIFICANT CHANGE UP (ref 3.5–5.3)
PROT SERPL-MCNC: 6.9 G/DL — SIGNIFICANT CHANGE UP (ref 6–8.3)
RBC # BLD: 3.39 M/UL — LOW (ref 4.2–5.8)
RBC # FLD: 17.4 % — HIGH (ref 10.3–14.5)
SODIUM SERPL-SCNC: 134 MMOL/L — LOW (ref 135–145)
WBC # BLD: 7.02 K/UL — SIGNIFICANT CHANGE UP (ref 3.8–10.5)
WBC # FLD AUTO: 7.02 K/UL — SIGNIFICANT CHANGE UP (ref 3.8–10.5)

## 2021-12-28 PROCEDURE — 99232 SBSQ HOSP IP/OBS MODERATE 35: CPT

## 2021-12-28 PROCEDURE — 99232 SBSQ HOSP IP/OBS MODERATE 35: CPT | Mod: GC

## 2021-12-28 PROCEDURE — 74177 CT ABD & PELVIS W/CONTRAST: CPT | Mod: 26

## 2021-12-28 RX ORDER — POTASSIUM CHLORIDE 20 MEQ
40 PACKET (EA) ORAL ONCE
Refills: 0 | Status: COMPLETED | OUTPATIENT
Start: 2021-12-28 | End: 2021-12-28

## 2021-12-28 RX ADMIN — PANTOPRAZOLE SODIUM 40 MILLIGRAM(S): 20 TABLET, DELAYED RELEASE ORAL at 06:13

## 2021-12-28 RX ADMIN — Medication 40 MILLIEQUIVALENT(S): at 08:50

## 2021-12-28 RX ADMIN — NAFCILLIN 200 GRAM(S): 10 INJECTION, POWDER, FOR SOLUTION INTRAVENOUS at 10:13

## 2021-12-28 RX ADMIN — NAFCILLIN 200 GRAM(S): 10 INJECTION, POWDER, FOR SOLUTION INTRAVENOUS at 14:05

## 2021-12-28 RX ADMIN — CHLORHEXIDINE GLUCONATE 1 APPLICATION(S): 213 SOLUTION TOPICAL at 11:29

## 2021-12-28 RX ADMIN — NAFCILLIN 200 GRAM(S): 10 INJECTION, POWDER, FOR SOLUTION INTRAVENOUS at 18:21

## 2021-12-28 RX ADMIN — NAFCILLIN 200 GRAM(S): 10 INJECTION, POWDER, FOR SOLUTION INTRAVENOUS at 21:03

## 2021-12-28 RX ADMIN — NAFCILLIN 200 GRAM(S): 10 INJECTION, POWDER, FOR SOLUTION INTRAVENOUS at 03:40

## 2021-12-28 RX ADMIN — NAFCILLIN 200 GRAM(S): 10 INJECTION, POWDER, FOR SOLUTION INTRAVENOUS at 06:14

## 2021-12-28 NOTE — PROGRESS NOTE ADULT - SUBJECTIVE AND OBJECTIVE BOX
CC: F/U COVID    Saw/spoke to patient. Generally well. No new focal complaints. RA.    Allergies  No Known Allergies    ANTIMICROBIALS:  nafcillin  IVPB    nafcillin  IVPB 2 every 4 hours    PE:    Vital Signs Last 24 Hrs  T(C): 36.7 (28 Dec 2021 10:17), Max: 37.3 (27 Dec 2021 22:34)  T(F): 98 (28 Dec 2021 10:17), Max: 99.2 (27 Dec 2021 22:34)  HR: 86 (28 Dec 2021 10:17) (72 - 86)  BP: 156/82 (28 Dec 2021 10:17) (121/61 - 156/82)  RR: 16 (28 Dec 2021 10:17) (16 - 18)  SpO2: 100% (28 Dec 2021 10:17) (96% - 100%)    Gen: AOx3, NAD, non-toxic, neck brace  CV: S1+S2 normal, nontachycardic  Resp: Clear bilat, no resp distress, no crackles/wheezes, RA  Abd: Soft, nontender, +BS  Ext: No LE edema, no wounds    LABS:                        10.0   7.02  )-----------( 702      ( 28 Dec 2021 03:57 )             30.4     12-28    134<L>  |  98  |  10  ----------------------------<  106<H>  3.5   |  27  |  0.63    Ca    8.6      28 Dec 2021 03:57  Phos  2.7     12-28  Mg     1.80     12-28    TPro  6.9  /  Alb  2.8<L>  /  TBili  0.2  /  DBili  x   /  AST  17  /  ALT  12  /  AlkPhos  70  12-28    MICROBIOLOGY:    .Blood Blood-Peripheral  12-20-21   No Growth Final     .Blood Blood-Peripheral  12-20-21   No Growth Final     Clean Catch Clean Catch (Midstream)  12-10-21   >100,000 CFU/ml Escherichia coli ESBL  --  Escherichia coli ESBL+    .Blood Blood-Peripheral  12-10-21   Growth in aerobic bottle: Staphylococcus aureus  See previous culture [id number]  Growth in anaerobic bottle: Staphylococcus epidermidis  Coag Negative Staphylococcus  Single set isolate, possible contaminant. Contact  Microbiology if susceptibility testing clinically  indicated.  --    Growth in aerobic and anaerobic bottles: Gram Positive Cocci in Clusters    .Blood Blood-Venous  12-10-21   Growth in aerobic and anaerobic bottles: Staphylococcus aureus  ***Blood Panel PCR results on this specimen are available  approximately 3 hours after the Gram stain result.***  Gram stain, PCR, and/or culture results may not always  correspond dueto difference in methodologies.  ************************************************************  This PCR assay was performed by multiplex PCR. This  Assay tests for 66 bacterial and resistance gene targets.  Please refer to the [facility] test directory  at [email or web address] for details.  --  Blood Culture PCR  Staphylococcus aureus    RADIOLOGY:    12/12 MR:    IMPRESSION:    CERVICAL SPINE:  1.  Discitis/osteomyelitis at C4-C5, with adjacent thin (4 mm) right   epidural fluid collection resulting in mild to moderate spinal canal   stenosis, as detailed above.  2.  Large prevertebral phlegmon and/or early developing abscess extending   from C2 to C6.  3.  Cervical spondylosis, as detailed above.    THORACIC SPINE:  1.  No suggestion of discitis/osteomyelitis, epidural fluid collection,   within spinal canal stenosis or disc herniation within the thoracic spine.  2.  Small bilateral pleural effusions, right greater than left.    LUMBAR SPINE:  1.  Discitis/osteomyelitis at L3-L4, with adjacent trace epidural   phlegmon without discrete appreciable epidural fluid collection.  2.  Bilateral psoas muscle abscesses and extensive surrounding   paravertebral phlegmon adjacent to the setting of osteomyelitis at L3-L4.  3.  Lumbar spondylosis, most significant for severe spinal canal stenosis   at L4-L5 and multilevel moderate to severe neuroforaminal narrowing, as   detailed above.    12/14 CT:    IMPRESSION: Disc space narrowing endplates chronic changes and erosions   of the endplates are seen at the C4-5 level which is secondary to   patient's known discitis/osteomyelitis.    Degenerative changes as described above.

## 2021-12-28 NOTE — PROGRESS NOTE ADULT - PROBLEM SELECTOR PLAN 3
CT abd pelvis w/ L psoas abscess.  MRI showed psoas abscess bilaterally.  Bacteremia with Staph aureus    IR consulted for possible psoas abscess drainage - evaluated and found abscess to be too small to drain.   - Abx as above  - f/U CT A/P

## 2021-12-28 NOTE — PROGRESS NOTE ADULT - ATTENDING COMMENTS
65 yo M with pmh of DM and Hypertension, presented with back pain and found to have discitis/OM now s/p laminectomy and [facility] and transferred back to [facility] for further management. Sepsis, resolved. 2/2 OM + psoas abscess. BCx 12/10 with MSSA and S. epidermidis. No intervention for psoas abscess. S/p laminectomy C3-C6 on 12/14. C/w nafcillin 2g q4 hours. Echo normal but did not exclude endocarditis. ID following. Will need 8 week course of antibiotics.  now COVID+, received mAb 12/21. blood cx 12/20 NGTD. Repeat CT a/p ordered to re-evaluate psaos abscess as pain increased. PT now rec home with outpt PT- will f/u CM for possible sher home abx infusion given lack of insurance.

## 2021-12-28 NOTE — PROGRESS NOTE ADULT - PROBLEM SELECTOR PLAN 2
MRI w/ discitis/ osteomyelitis in C4/C5 with prevertebral/ phlegmon w/ concern for developing abscess extending from C2 to C6 levels and discitis/ osteomyelitis in L3/L4 level and an extensive paravertebral phlegmon across L3-4.  Now s/p C3-C6 laminectomy w/ PSF by ortho spine.   - Continue Nafcillin 2g Q4hrs - patient will need this for 8 weeks per ID from ( 12/14 - )   - Will need PICC day before discharge  - Will need to be afebrile 24Hours before PICC   - F/U with SW/CM for dispo  - Activity as tolerated.  - Incentive spirometer  - As per ortho,  keep C-collar until 1/11/22

## 2021-12-28 NOTE — PROGRESS NOTE ADULT - SUBJECTIVE AND OBJECTIVE BOX
Peter Herbert MD  [phone]/[id number]      PROGRESS NOTE:     Patient is a 66y old  Male who presents with a chief complaint of Cervical and Lumbar Osteomyelitis (27 Dec 2021 15:56)      SUBJECTIVE / OVERNIGHT EVENTS:    OVERNIGHT:       Patient examined at bedside        MEDICATIONS  (STANDING):  chlorhexidine 2% Cloths 1 Application(s) Topical daily  influenza  Vaccine (HIGH DOSE) 0.7 milliLiter(s) IntraMuscular once  nafcillin  IVPB      nafcillin  IVPB 2 Gram(s) IV Intermittent every 4 hours  pantoprazole    Tablet 40 milliGRAM(s) Oral before breakfast  senna 2 Tablet(s) Oral at bedtime    MEDICATIONS  (PRN):  acetaminophen     Tablet .. 650 milliGRAM(s) Oral every 6 hours PRN Temp greater or equal to 38C (100.4F), Mild Pain (1 - 3)  cyclobenzaprine 5 milliGRAM(s) Oral every 8 hours PRN Muscle Spasm  guaiFENesin Oral Liquid (Sugar-Free) 100 milliGRAM(s) Oral every 6 hours PRN Cough      CAPILLARY BLOOD GLUCOSE        I&O's Summary    27 Dec 2021 07:01  -  28 Dec 2021 07:00  --------------------------------------------------------  IN: 200 mL / OUT: 1050 mL / NET: -850 mL        PHYSICAL EXAM:  Vital Signs Last 24 Hrs  T(C): 37.2 (28 Dec 2021 01:35), Max: 37.3 (27 Dec 2021 22:34)  T(F): 99 (28 Dec 2021 01:35), Max: 99.2 (27 Dec 2021 22:34)  HR: 74 (28 Dec 2021 01:35) (72 - 95)  BP: 125/80 (28 Dec 2021 01:35) (111/79 - 132/74)  BP(mean): --  RR: 17 (28 Dec 2021 01:35) (16 - 18)  SpO2: 100% (28 Dec 2021 01:35) (100% - 100%)    CONSTITUTIONAL: NAD; well-developed  HEENT: PERRL, clear conjunctiva  RESPIRATORY: Normal respiratory effort; lungs are clear to auscultation bilaterally; No Crackles/Rhonchi/Wheezing  CARDIOVASCULAR: Regular rate and rhythm, normal S1 and S2, no murmur/rub/gallop; No lower extremity edema; Peripheral pulses are 2+ bilaterally  ABDOMEN: Nontender to palpation, normoactive bowel sounds, no rebound/guarding; No hepatosplenomegaly  MUSCLOSKELETAL: no clubbing or cyanosis of digits; no joint swelling or tenderness to palpation  EXTREMITY: Lower extremities Non-tender to palpation; non-erythematous B/L  Neuro: A&Ox3; no focal deficits   Psych: normal mood; Affect appropirate    LABS:                        10.0   7.02  )-----------( 702      ( 28 Dec 2021 03:57 )             30.4     12-28    134<L>  |  98  |  10  ----------------------------<  106<H>  3.5   |  27  |  0.63    Ca    8.6      28 Dec 2021 03:57  Phos  2.7     12-28  Mg     1.80     12-28    TPro  6.9  /  Alb  2.8<L>  /  TBili  0.2  /  DBili  x   /  AST  17  /  ALT  12  /  AlkPhos  70  12-28                RADIOLOGY & ADDITIONAL TESTS:  Results Reviewed:   Imaging Personally Reviewed:  Electrocardiogram Personally Reviewed:    COORDINATION OF CARE:  Care Discussed with Consultants/Other Providers [Y/N]:  Prior or Outpatient Records Reviewed [Y/N]:   Peter Herbert MD  [phone]/[facility] [id number]      PROGRESS NOTE:     Patient is a 66y old  Male who presents with a chief complaint of Cervical and Lumbar Osteomyelitis (27 Dec 2021 15:56)      SUBJECTIVE / OVERNIGHT EVENTS:    OVERNIGHT:   - No events     Patient was seen and examined at bedside this morning. Denies any nausea/vomiting/diarrhea, headache, shortness of breath, abdominal pain or chest pain/palpitations. Patient responding appropriately to questions and able to make needs known. Vital signs/imaging reviewed. Patient still has L hip pain.           MEDICATIONS  (STANDING):  chlorhexidine 2% Cloths 1 Application(s) Topical daily  influenza  Vaccine (HIGH DOSE) 0.7 milliLiter(s) IntraMuscular once  nafcillin  IVPB      nafcillin  IVPB 2 Gram(s) IV Intermittent every 4 hours  pantoprazole    Tablet 40 milliGRAM(s) Oral before breakfast  senna 2 Tablet(s) Oral at bedtime    MEDICATIONS  (PRN):  acetaminophen     Tablet .. 650 milliGRAM(s) Oral every 6 hours PRN Temp greater or equal to 38C (100.4F), Mild Pain (1 - 3)  cyclobenzaprine 5 milliGRAM(s) Oral every 8 hours PRN Muscle Spasm  guaiFENesin Oral Liquid (Sugar-Free) 100 milliGRAM(s) Oral every 6 hours PRN Cough      CAPILLARY BLOOD GLUCOSE        I&O's Summary    27 Dec 2021 07:01  -  28 Dec 2021 07:00  --------------------------------------------------------  IN: 200 mL / OUT: 1050 mL / NET: -850 mL        PHYSICAL EXAM:  Vital Signs Last 24 Hrs  T(C): 37.2 (28 Dec 2021 01:35), Max: 37.3 (27 Dec 2021 22:34)  T(F): 99 (28 Dec 2021 01:35), Max: 99.2 (27 Dec 2021 22:34)  HR: 74 (28 Dec 2021 01:35) (72 - 95)  BP: 125/80 (28 Dec 2021 01:35) (111/79 - 132/74)  BP(mean): --  RR: 17 (28 Dec 2021 01:35) (16 - 18)  SpO2: 100% (28 Dec 2021 01:35) (100% - 100%)    CONSTITUTIONAL: NAD; thin-appearing   HEENT: PERRL, clear conjunctiva  RESPIRATORY: Normal respiratory effort; lungs are clear to auscultation bilaterally; No Crackles/Rhonchi/Wheezing  CARDIOVASCULAR: Regular rate and rhythm, normal S1 and S2, no murmur/rub/gallop; No lower extremity edema; Peripheral pulses are 2+ bilaterally  ABDOMEN: L hip tender to palpation, normoactive bowel sounds, no rebound/guarding; No hepatosplenomegaly  MUSCLOSKELETAL: no clubbing or cyanosis of digits; no joint swelling or tenderness to palpation  EXTREMITY: Lower extremities Non-tender to palpation; non-erythematous B/L  Neuro: A&Ox3; no focal deficits   Psych: normal mood; Affect appropirate    LABS:                        10.0   7.02  )-----------( 702      ( 28 Dec 2021 03:57 )             30.4     12-28    134<L>  |  98  |  10  ----------------------------<  106<H>  3.5   |  27  |  0.63    Ca    8.6      28 Dec 2021 03:57  Phos  2.7     12-28  Mg     1.80     12-28    TPro  6.9  /  Alb  2.8<L>  /  TBili  0.2  /  DBili  x   /  AST  17  /  ALT  12  /  AlkPhos  70  12-28                RADIOLOGY & ADDITIONAL TESTS:  Results Reviewed:   Imaging Personally Reviewed:  Electrocardiogram Personally Reviewed:    COORDINATION OF CARE:  Care Discussed with Consultants/Other Providers [Y/N]:  Prior or Outpatient Records Reviewed [Y/N]:

## 2021-12-28 NOTE — PROGRESS NOTE ADULT - ASSESSMENT
66 M with T2DM and HTN with ED visit 2 days prior to presentation, found to have ESBL UTI and MSSA bacteremia and started on cefpodoxime, presented for worsening back pain a/w weakness, found to have b/l psoas abscesses and cervical and lumbar osteomyelitis  LE weakness, MSSA bacteremia, cervical and lumbar osteomyelitis, psoas abscess, s/p C3-C6 laminectomy 12/14  COVID+ - s/p monoclonal ab infusion 12/21  TTE generally reassuring  Overall,  1) MSSA Bacteremia  - Concern for OM/psoas abscess; MSSA on BCXs, repeats clear  - Nafcillin 2g q 4 through 2/7/21  - TTE reassuring, hold on IGGI as long as stable clinical status (will need prolonged course abx regardless)  2) OM/Discitis  - S/p laminectomy  - Wound care per surgery team  3) COVID  - RA, asymptomatic but nosocomial?  - S/p Monoclonal  - Hold on RemD/Dexa unless progressive O2 requirements  - Check CXR    Marc Cameron MD  Pager [phone]  From 5pm-9am, and on weekends call [phone]

## 2021-12-28 NOTE — PROGRESS NOTE ADULT - PROBLEM SELECTOR PLAN 5
Diet: Consistent Carb  DVT: SCDs - chemical prophylaxis contraindicated   Dispo: Home   - Patient has no insurance, will f/u w SW/CM about home dispo with PICC

## 2021-12-28 NOTE — PROGRESS NOTE ADULT - ASSESSMENT
66M w/o any known PMH presented with worsening back pain w/ associated weakness found to have Staph aureus bacteremia in the setting of a L psoas abscess and cervical and lumbar osteomyelitis w/ developing phelgmon/abscess formation, now s/p C3-C6 laminectomy and PSF. Patient to continue 8 weeks nafcillin for full tx of osteomyelitis. No insurance. COVID +. Patient will need to be afebrile for 24 hours before obtaining PICC line. Preferably will need PICC day prior to being discharged. Dispo home w PICC line will need home health aid.

## 2021-12-29 LAB
ALBUMIN SERPL ELPH-MCNC: 3 G/DL — LOW (ref 3.3–5)
ALP SERPL-CCNC: 77 U/L — SIGNIFICANT CHANGE UP (ref 40–120)
ALT FLD-CCNC: 11 U/L — SIGNIFICANT CHANGE UP (ref 4–41)
ANION GAP SERPL CALC-SCNC: 12 MMOL/L — SIGNIFICANT CHANGE UP (ref 7–14)
AST SERPL-CCNC: 16 U/L — SIGNIFICANT CHANGE UP (ref 4–40)
BASOPHILS # BLD AUTO: 0.05 K/UL — SIGNIFICANT CHANGE UP (ref 0–0.2)
BASOPHILS NFR BLD AUTO: 0.6 % — SIGNIFICANT CHANGE UP (ref 0–2)
BILIRUB SERPL-MCNC: 0.2 MG/DL — SIGNIFICANT CHANGE UP (ref 0.2–1.2)
BUN SERPL-MCNC: 12 MG/DL — SIGNIFICANT CHANGE UP (ref 7–23)
CALCIUM SERPL-MCNC: 9 MG/DL — SIGNIFICANT CHANGE UP (ref 8.4–10.5)
CHLORIDE SERPL-SCNC: 101 MMOL/L — SIGNIFICANT CHANGE UP (ref 98–107)
CO2 SERPL-SCNC: 25 MMOL/L — SIGNIFICANT CHANGE UP (ref 22–31)
CREAT SERPL-MCNC: 0.72 MG/DL — SIGNIFICANT CHANGE UP (ref 0.5–1.3)
EOSINOPHIL # BLD AUTO: 0.4 K/UL — SIGNIFICANT CHANGE UP (ref 0–0.5)
EOSINOPHIL NFR BLD AUTO: 4.8 % — SIGNIFICANT CHANGE UP (ref 0–6)
GLUCOSE SERPL-MCNC: 88 MG/DL — SIGNIFICANT CHANGE UP (ref 70–99)
HCT VFR BLD CALC: 32.9 % — LOW (ref 39–50)
HGB BLD-MCNC: 11.4 G/DL — LOW (ref 13–17)
IANC: 4.32 K/UL — SIGNIFICANT CHANGE UP (ref 1.5–8.5)
IMM GRANULOCYTES NFR BLD AUTO: 1.2 % — SIGNIFICANT CHANGE UP (ref 0–1.5)
LYMPHOCYTES # BLD AUTO: 2.93 K/UL — SIGNIFICANT CHANGE UP (ref 1–3.3)
LYMPHOCYTES # BLD AUTO: 34.9 % — SIGNIFICANT CHANGE UP (ref 13–44)
MAGNESIUM SERPL-MCNC: 2 MG/DL — SIGNIFICANT CHANGE UP (ref 1.6–2.6)
MCHC RBC-ENTMCNC: 31.9 PG — SIGNIFICANT CHANGE UP (ref 27–34)
MCHC RBC-ENTMCNC: 34.7 GM/DL — SIGNIFICANT CHANGE UP (ref 32–36)
MCV RBC AUTO: 92.2 FL — SIGNIFICANT CHANGE UP (ref 80–100)
MONOCYTES # BLD AUTO: 0.59 K/UL — SIGNIFICANT CHANGE UP (ref 0–0.9)
MONOCYTES NFR BLD AUTO: 7 % — SIGNIFICANT CHANGE UP (ref 2–14)
NEUTROPHILS # BLD AUTO: 4.32 K/UL — SIGNIFICANT CHANGE UP (ref 1.8–7.4)
NEUTROPHILS NFR BLD AUTO: 51.5 % — SIGNIFICANT CHANGE UP (ref 43–77)
NRBC # BLD: 0 /100 WBCS — SIGNIFICANT CHANGE UP
NRBC # FLD: 0 K/UL — SIGNIFICANT CHANGE UP
PHOSPHATE SERPL-MCNC: 2.6 MG/DL — SIGNIFICANT CHANGE UP (ref 2.5–4.5)
PLATELET # BLD AUTO: 748 K/UL — HIGH (ref 150–400)
POTASSIUM SERPL-MCNC: 4.4 MMOL/L — SIGNIFICANT CHANGE UP (ref 3.5–5.3)
POTASSIUM SERPL-SCNC: 4.4 MMOL/L — SIGNIFICANT CHANGE UP (ref 3.5–5.3)
PROT SERPL-MCNC: 7.5 G/DL — SIGNIFICANT CHANGE UP (ref 6–8.3)
RBC # BLD: 3.57 M/UL — LOW (ref 4.2–5.8)
RBC # FLD: 17.5 % — HIGH (ref 10.3–14.5)
SODIUM SERPL-SCNC: 138 MMOL/L — SIGNIFICANT CHANGE UP (ref 135–145)
WBC # BLD: 8.39 K/UL — SIGNIFICANT CHANGE UP (ref 3.8–10.5)
WBC # FLD AUTO: 8.39 K/UL — SIGNIFICANT CHANGE UP (ref 3.8–10.5)

## 2021-12-29 PROCEDURE — 99232 SBSQ HOSP IP/OBS MODERATE 35: CPT

## 2021-12-29 PROCEDURE — 99232 SBSQ HOSP IP/OBS MODERATE 35: CPT | Mod: GC

## 2021-12-29 RX ADMIN — PANTOPRAZOLE SODIUM 40 MILLIGRAM(S): 20 TABLET, DELAYED RELEASE ORAL at 06:19

## 2021-12-29 RX ADMIN — NAFCILLIN 200 GRAM(S): 10 INJECTION, POWDER, FOR SOLUTION INTRAVENOUS at 01:05

## 2021-12-29 RX ADMIN — NAFCILLIN 200 GRAM(S): 10 INJECTION, POWDER, FOR SOLUTION INTRAVENOUS at 19:14

## 2021-12-29 RX ADMIN — NAFCILLIN 200 GRAM(S): 10 INJECTION, POWDER, FOR SOLUTION INTRAVENOUS at 15:21

## 2021-12-29 RX ADMIN — NAFCILLIN 200 GRAM(S): 10 INJECTION, POWDER, FOR SOLUTION INTRAVENOUS at 21:20

## 2021-12-29 RX ADMIN — NAFCILLIN 200 GRAM(S): 10 INJECTION, POWDER, FOR SOLUTION INTRAVENOUS at 11:14

## 2021-12-29 RX ADMIN — NAFCILLIN 200 GRAM(S): 10 INJECTION, POWDER, FOR SOLUTION INTRAVENOUS at 06:19

## 2021-12-29 NOTE — PROGRESS NOTE ADULT - ATTENDING COMMENTS
65 yo M with pmh of DM and Hypertension, presented with back pain and found to have discitis/OM now s/p laminectomy and [facility] and transferred back to [facility] for further management. Sepsis, resolved. 2/2 OM + psoas abscess. BCx 12/10 with MSSA and S. epidermidis. No intervention for psoas abscess. S/p laminectomy C3-C6 on 12/14. C/w nafcillin 2g q4 hours. Echo normal but did not exclude endocarditis. ID following. Will need total 8 week course of antibiotics.  now COVID+, received mAb 12/21. blood cx 12/20 NGTD. Repeat CT a/p showed improved psaos abscess. PT now rec home with outpt PT- will f/u CM for possible sher home abx infusion given lack of insurance.

## 2021-12-29 NOTE — PROGRESS NOTE ADULT - SUBJECTIVE AND OBJECTIVE BOX
Peter Herbert MD  [phone]/[facility] [id number]      PROGRESS NOTE:     Patient is a 66y old  Male who presents with a chief complaint of Cervical and Lumbar Osteomyelitis (28 Dec 2021 07:28)      SUBJECTIVE / OVERNIGHT EVENTS:    OVERNIGHT:       Patient examined at bedside        MEDICATIONS  (STANDING):  chlorhexidine 2% Cloths 1 Application(s) Topical daily  influenza  Vaccine (HIGH DOSE) 0.7 milliLiter(s) IntraMuscular once  nafcillin  IVPB      nafcillin  IVPB 2 Gram(s) IV Intermittent every 4 hours  pantoprazole    Tablet 40 milliGRAM(s) Oral before breakfast  senna 2 Tablet(s) Oral at bedtime    MEDICATIONS  (PRN):  acetaminophen     Tablet .. 650 milliGRAM(s) Oral every 6 hours PRN Temp greater or equal to 38C (100.4F), Mild Pain (1 - 3)  cyclobenzaprine 5 milliGRAM(s) Oral every 8 hours PRN Muscle Spasm  guaiFENesin Oral Liquid (Sugar-Free) 100 milliGRAM(s) Oral every 6 hours PRN Cough      CAPILLARY BLOOD GLUCOSE        I&O's Summary    28 Dec 2021 07:01  -  29 Dec 2021 07:00  --------------------------------------------------------  IN: 0 mL / OUT: 650 mL / NET: -650 mL        PHYSICAL EXAM:  Vital Signs Last 24 Hrs  T(C): 37.1 (29 Dec 2021 01:28), Max: 37.7 (28 Dec 2021 18:18)  T(F): 98.7 (29 Dec 2021 01:28), Max: 99.9 (28 Dec 2021 18:18)  HR: 73 (29 Dec 2021 01:28) (73 - 86)  BP: 115/75 (29 Dec 2021 01:28) (115/75 - 156/82)  BP(mean): --  RR: 18 (29 Dec 2021 01:28) (16 - 18)  SpO2: 100% (29 Dec 2021 01:28) (100% - 100%)    CONSTITUTIONAL: NAD; well-developed  HEENT: PERRL, clear conjunctiva  RESPIRATORY: Normal respiratory effort; lungs are clear to auscultation bilaterally; No Crackles/Rhonchi/Wheezing  CARDIOVASCULAR: Regular rate and rhythm, normal S1 and S2, no murmur/rub/gallop; No lower extremity edema; Peripheral pulses are 2+ bilaterally  ABDOMEN: Nontender to palpation, normoactive bowel sounds, no rebound/guarding; No hepatosplenomegaly  MUSCLOSKELETAL: no clubbing or cyanosis of digits; no joint swelling or tenderness to palpation  EXTREMITY: Lower extremities Non-tender to palpation; non-erythematous B/L  Neuro: A&Ox3; no focal deficits   Psych: normal mood; Affect appropirate    LABS:                        11.4   8.39  )-----------( 748      ( 29 Dec 2021 07:07 )             32.9     12-28    134<L>  |  98  |  10  ----------------------------<  106<H>  3.5   |  27  |  0.63    Ca    8.6      28 Dec 2021 03:57  Phos  2.7     12-28  Mg     1.80     12-28    TPro  6.9  /  Alb  2.8<L>  /  TBili  0.2  /  DBili  x   /  AST  17  /  ALT  12  /  AlkPhos  70  12-28                RADIOLOGY & ADDITIONAL TESTS:  Results Reviewed:   Imaging Personally Reviewed:  Electrocardiogram Personally Reviewed:    COORDINATION OF CARE:  Care Discussed with Consultants/Other Providers [Y/N]:  Prior or Outpatient Records Reviewed [Y/N]:   Peter Herbert MD  [phone]/[facility] [id number]      PROGRESS NOTE:     Patient is a 66y old  Male who presents with a chief complaint of Cervical and Lumbar Osteomyelitis (28 Dec 2021 07:28)      SUBJECTIVE / OVERNIGHT EVENTS:    OVERNIGHT:   -No overnight events     Patient was seen and examined at bedside this morning. Denies any nausea/vomiting/diarrhea, headache, shortness of breath, abdominal pain or chest pain/palpitations. Patient responding appropriately to questions and able to make needs known. Vital signs/imaging reviewed. Patient still comlpaining of cough.           MEDICATIONS  (STANDING):  chlorhexidine 2% Cloths 1 Application(s) Topical daily  influenza  Vaccine (HIGH DOSE) 0.7 milliLiter(s) IntraMuscular once  nafcillin  IVPB      nafcillin  IVPB 2 Gram(s) IV Intermittent every 4 hours  pantoprazole    Tablet 40 milliGRAM(s) Oral before breakfast  senna 2 Tablet(s) Oral at bedtime    MEDICATIONS  (PRN):  acetaminophen     Tablet .. 650 milliGRAM(s) Oral every 6 hours PRN Temp greater or equal to 38C (100.4F), Mild Pain (1 - 3)  cyclobenzaprine 5 milliGRAM(s) Oral every 8 hours PRN Muscle Spasm  guaiFENesin Oral Liquid (Sugar-Free) 100 milliGRAM(s) Oral every 6 hours PRN Cough      CAPILLARY BLOOD GLUCOSE        I&O's Summary    28 Dec 2021 07:01  -  29 Dec 2021 07:00  --------------------------------------------------------  IN: 0 mL / OUT: 650 mL / NET: -650 mL        PHYSICAL EXAM:  Vital Signs Last 24 Hrs  T(C): 37.1 (29 Dec 2021 01:28), Max: 37.7 (28 Dec 2021 18:18)  T(F): 98.7 (29 Dec 2021 01:28), Max: 99.9 (28 Dec 2021 18:18)  HR: 73 (29 Dec 2021 01:28) (73 - 86)  BP: 115/75 (29 Dec 2021 01:28) (115/75 - 156/82)  BP(mean): --  RR: 18 (29 Dec 2021 01:28) (16 - 18)  SpO2: 100% (29 Dec 2021 01:28) (100% - 100%)    CONSTITUTIONAL: NAD; well-developed; C collar   HEENT: PERRL, clear conjunctiva  RESPIRATORY: Normal respiratory effort; lungs are clear to auscultation bilaterally; No Crackles/Rhonchi/Wheezing  CARDIOVASCULAR: Regular rate and rhythm, normal S1 and S2, no murmur/rub/gallop; No lower extremity edema; Peripheral pulses are 2+ bilaterally  ABDOMEN: Nontender to palpation, normoactive bowel sounds, no rebound/guarding; No hepatosplenomegaly  MUSCLOSKELETAL: no clubbing or cyanosis of digits; no joint swelling or tenderness to palpation  EXTREMITY: Lower extremities Non-tender to palpation; non-erythematous B/L  Neuro: A&Ox3; no focal deficits   Psych: normal mood; Affect appropirate    LABS:                        11.4   8.39  )-----------( 748      ( 29 Dec 2021 07:07 )             32.9     12-28    134<L>  |  98  |  10  ----------------------------<  106<H>  3.5   |  27  |  0.63    Ca    8.6      28 Dec 2021 03:57  Phos  2.7     12-28  Mg     1.80     12-28    TPro  6.9  /  Alb  2.8<L>  /  TBili  0.2  /  DBili  x   /  AST  17  /  ALT  12  /  AlkPhos  70  12-28                RADIOLOGY & ADDITIONAL TESTS:  Results Reviewed:   Imaging Personally Reviewed:  Electrocardiogram Personally Reviewed:    COORDINATION OF CARE:  Care Discussed with Consultants/Other Providers [Y/N]:  Prior or Outpatient Records Reviewed [Y/N]:

## 2021-12-29 NOTE — PROGRESS NOTE ADULT - ASSESSMENT
66 M with T2DM and HTN with ED visit 2 days prior to presentation, found to have ESBL UTI and MSSA bacteremia and started on cefpodoxime, presented for worsening back pain a/w weakness, found to have b/l psoas abscesses and cervical and lumbar osteomyelitis  LE weakness, MSSA bacteremia, cervical and lumbar osteomyelitis, psoas abscess, s/p C3-C6 laminectomy 12/14  COVID+ - s/p monoclonal ab infusion 12/21  TTE generally reassuring  Overall,  1) MSSA Bacteremia  - Concern for OM/psoas abscess; MSSA on BCXs, repeats clear  - Nafcillin 2g q 4 through 2/21/21  - TTE reassuring, hold on GIGI as long as stable clinical status (will need prolonged course abx regardless)  2) OM/Discitis  - S/p laminectomy  - Wound care per surgery team  3) COVID  - RA, asymptomatic but nosocomial?  - S/p Monoclonal  - Hold on RemD/Dexa unless progressive O2 requirements  - Check CXR    Marc Cameron MD  Pager [phone]  From 5pm-9am, and on weekends call [phone]

## 2021-12-29 NOTE — PROGRESS NOTE ADULT - PROBLEM SELECTOR PLAN 3
CT abd pelvis w/ L psoas abscess.  MRI showed psoas abscess bilaterally.  Bacteremia with Staph aureus    IR consulted for possible psoas abscess drainage - evaluated and found abscess to be too small to drain.   CT A/P 12/28 shows decreased size of psoas abscess   - Abx as above

## 2021-12-29 NOTE — PROGRESS NOTE ADULT - ASSESSMENT
66M w/o any known PMH presented s/p cervical laminectomy at [facility] for Osteo now on 8 weeks of Nafcillin , and COVID +. Patient will need PICC before discharge home.

## 2021-12-29 NOTE — PROGRESS NOTE ADULT - SUBJECTIVE AND OBJECTIVE BOX
Patient is a 66y old  Male who presents with a chief complaint of Cervical and Lumbar Osteomyelitis (29 Dec 2021 07:47)      HPI:  Patient is a 65 y/o M w/ PMH of hypertension and pre-diabetes who presented for worsening back pain, found to have cervical and lumbar osteomyelitis.    On Nov 15 (almost 1 month prior to admission), upon waking up in the morning, he felt the back pain and leg weakness that he couldn't walk. He denied any trauma or injury to the area. He felt the severe pain all over his spine from his neck down to his lumbar area and shooting pain toward his legs. He never had such pain episodes like this before. He denied bladder/ bowel incontinence. He endorsed increased urinary frequency. No N/V/D, fever, chills, chest pain, abdominal pain, recent surgery or sick contacts.       BCx were positive for MSSA Bacteremia. MRI significant for cervical and lumbar osteomyelitis w/ developing phelgmon/abscess formation. ID consulted, initially patient was on Vanc/Zosyn, however patient was de-escalated to Nafcillin given MSSA bacteremia. Orthopedics was consulted. Patient underwent C3-6 laminectomy w/ [place] w/ Dr. Lara. No reported complications. Patient returned to [facility] for further management.    (16 Dec 2021 08:00)    Now on isolation for covid, tested positive on 12/20.  Patient reports cough but states his weakness has been improving. His roommate has been agitated and he states he would like a new room.  cervical collar in place.    REVIEW OF SYSTEMS  Constitutional - No fever, No weight loss, No fatigue  HEENT - No eye pain, No visual disturbances, No difficulty hearing, No tinnitus, No vertigo, No neck pain  Respiratory - + cough, No wheezing, No shortness of breath  Cardiovascular - No chest pain, No palpitations  Gastrointestinal - No abdominal pain, No nausea, No vomiting, No diarrhea, No constipation  Genitourinary - No dysuria, No frequency, No hematuria, No incontinence  Neurological - No headaches, No memory loss, + loss of strength, No numbness, No tremors  Skin - No itching, No rashes, No lesions   Endocrine - No temperature intolerance  Musculoskeletal - No joint pain, No joint swelling, No muscle pain  Psychiatric - No depression, No anxiety    PAST MEDICAL & SURGICAL HISTORY  No pertinent past medical history    Diabetes mellitus    Essential hypertension    Prediabetes    History of hemorrhoidectomy         CURRENT FUNCTIONAL STATUS  12/28  Bed Mobility  Bed Mobility Training Rehab Potential: good, to achieve stated therapy goals  Bed Mobility Training Symptoms Noted During/After Treatment: none  Bed Mobility Training Rolling/Turning: independent;  supervision;  bed rails  Bed Mobility Training Scooting: independent;  supervision;  bed rails  Bed Mobility Training Bridging: independent;  supervision;  bed rails  Bed Mobility Training Sit-to-Supine: independent;  supervision;  bed rails  Bed Mobility Training Supine-to-Sit: independent;  supervision;  bed rails    Sit-Stand Transfer Training  Sit-to-Stand Transfer Training Rehab Potential: good, to achieve stated therapy goals  Sit-to-Stand Transfer Training Symptoms Noted During/After Treatment: none  Transfer Training Sit-to-Stand Transfer: independent;  supervision  Transfer Training Stand-to-Sit Transfer: independent;  supervision    Gait Training  Gait Training Rehab Potential: good, to achieve stated therapy goals  Gait Training Symptoms Noted During/After Treatment: none  Gait Training: independent;  supervision;  200 feet  Gait Analysis: 2-point gait   200 feet    Therapeutic Exercise  Therapeutic Exercise Detail: Pt reviewed and performed bilateral upper and lower extremity therex 1 set 15 reps each         FAMILY HISTORY   No pertinent family history in first degree relatives        RECENT LABS/IMAGING  CBC Full  -  ( 29 Dec 2021 07:07 )  WBC Count : 8.39 K/uL  RBC Count : 3.57 M/uL  Hemoglobin : 11.4 g/dL  Hematocrit : 32.9 %  Platelet Count - Automated : 748 K/uL  Mean Cell Volume : 92.2 fL  Mean Cell Hemoglobin : 31.9 pg  Mean Cell Hemoglobin Concentration : 34.7 gm/dL  Auto Neutrophil # : 4.32 K/uL  Auto Lymphocyte # : 2.93 K/uL  Auto Monocyte # : 0.59 K/uL  Auto Eosinophil # : 0.40 K/uL  Auto Basophil # : 0.05 K/uL  Auto Neutrophil % : 51.5 %  Auto Lymphocyte % : 34.9 %  Auto Monocyte % : 7.0 %  Auto Eosinophil % : 4.8 %  Auto Basophil % : 0.6 %    12-29    138  |  101  |  12  ----------------------------<  88  4.4   |  25  |  0.72    Ca    9.0      29 Dec 2021 07:07  Phos  2.6     12-29  Mg     2.00     12-29    TPro  7.5  /  Alb  3.0<L>  /  TBili  0.2  /  DBili  x   /  AST  16  /  ALT  11  /  AlkPhos  77  12-29        VITALS  T(C): 37.1 (12-29-21 @ 01:28), Max: 37.7 (12-28-21 @ 18:18)  HR: 73 (12-29-21 @ 01:28) (73 - 81)  BP: 115/75 (12-29-21 @ 01:28) (115/75 - 148/79)  RR: 18 (12-29-21 @ 01:28) (16 - 18)  SpO2: 100% (12-29-21 @ 01:28) (100% - 100%)  Wt(kg): --    ALLERGIES  No Known Allergies      MEDICATIONS   acetaminophen     Tablet .. 650 milliGRAM(s) Oral every 6 hours PRN  chlorhexidine 2% Cloths 1 Application(s) Topical daily  cyclobenzaprine 5 milliGRAM(s) Oral every 8 hours PRN  guaiFENesin Oral Liquid (Sugar-Free) 100 milliGRAM(s) Oral every 6 hours PRN  influenza  Vaccine (HIGH DOSE) 0.7 milliLiter(s) IntraMuscular once  nafcillin  IVPB      nafcillin  IVPB 2 Gram(s) IV Intermittent every 4 hours  pantoprazole    Tablet 40 milliGRAM(s) Oral before breakfast  senna 2 Tablet(s) Oral at bedtime      ----------------------------------------------------------------------------------------   PHYSICAL EXAM  Constitutional - NAD, Comfortable  HEENT -+ collar  Chest - no respiratory distress  Cardiovascular - RRR, S1S2  Abdomen -     NTND  Extremities - No C/C/E, No calf tenderness   Neurologic Exam -                    Cognitive - Awake, Alert, AAO to self, place, date, year, situation     Communication - Fluent, No dysarthria     Cranial Nerves - CN 2-12 intact     Motor -                      LEFT    UE - 5-/5                    RIGHT UE - 5-/5                    LEFT    LE - 5-/5                    RIGHT LE - 5-/5        Sensory - intact to LT b/l     Balance - WNL Static  Psychiatric - Mood stable, Affect WNL  ----------------------------------------------------------------------------------------  ASSESSMENT/PLAN 65 yo m with discitis/ osteomyelitis in C4/C5 with prevertebral/ phlegmon w/ concern for developing abscess extending from C2 to C6 levels and discitis/ osteomyelitis in L3/L4 level and an extensive paravertebral phlegmon across L3-4.  Now s/p C3-C6 laminectomy w/ PSF by ortho spine.   psoas abscess b/l, with staph aureus  TTE w/o vegetation  plan for picc to continue Nafcillin 2mg q 4, to continue for 6 weeks from 12/14  Pain -tylenol  DVT PPX - scd  continue bedside PT and OT  turn and position q 2 to prevent skin breakdown  Rehab - patient ambulated 200' with supervision with bedside therapy yesterday. Patient does not need inpatient rehab, and can be discharge home when medically cleared.

## 2021-12-29 NOTE — PROGRESS NOTE ADULT - SUBJECTIVE AND OBJECTIVE BOX
CC: COVID    Saw/spoke to patient. Unchanged.    Allergies  No Known Allergies    ANTIMICROBIALS:  nafcillin  IVPB    nafcillin  IVPB 2 every 4 hours    PE:    Vital Signs Last 24 Hrs  T(C): 37.3 (29 Dec 2021 10:43), Max: 37.7 (28 Dec 2021 18:18)  T(F): 99.2 (29 Dec 2021 10:43), Max: 99.9 (28 Dec 2021 18:18)  HR: 89 (29 Dec 2021 10:43) (73 - 89)  BP: 125/66 (29 Dec 2021 10:43) (115/75 - 148/79)  RR: 16 (29 Dec 2021 10:43) (16 - 18)  SpO2: 100% (29 Dec 2021 10:43) (100% - 100%)    Gen: AOx3, NAD, non-toxic, pleasant  CV: S1+S2 normal, nontachycardic  Resp: Clear bilat, no resp distress, no crackles/wheezes  Abd: Soft, nontender, +BS  Ext: No LE edema, no wounds    LABS:                        11.4   8.39  )-----------( 748      ( 29 Dec 2021 07:07 )             32.9     12-29    138  |  101  |  12  ----------------------------<  88  4.4   |  25  |  0.72    Ca    9.0      29 Dec 2021 07:07  Phos  2.6     12-29  Mg     2.00     12-29    TPro  7.5  /  Alb  3.0<L>  /  TBili  0.2  /  DBili  x   /  AST  16  /  ALT  11  /  AlkPhos  77  12-29    MICROBIOLOGY:    .Blood Blood-Peripheral  12-20-21   No Growth Final    .Blood Blood-Peripheral  12-20-21   No Growth Final      .Blood Blood-Venous  12-12-21   No Growth Final      Clean Catch Clean Catch (Midstream)  12-10-21   >100,000 CFU/ml Escherichia coli ESBL  --  Escherichia coli ESBL    .Blood Blood-Peripheral  12-10-21   Growth in aerobic bottle: Staphylococcus aureus  See previous culture [id number]  Growth in anaerobic bottle: Staphylococcus epidermidis  Coag Negative Staphylococcus  Single set isolate, possible contaminant. Contact  Microbiology if susceptibility testing clinically  indicated.  --    Growth in aerobic and anaerobic bottles: Gram Positive Cocci in Clusters    .Blood Blood-Venous  12-10-21   Growth in aerobic and anaerobic bottles: Staphylococcus aureus  ***Blood Panel PCR results on this specimen are available  approximately 3 hours after the Gram stain result.***  Gram stain, PCR, and/or culture results may not always  correspond dueto difference in methodologies.  ************************************************************  This PCR assay was performed by multiplex PCR. This  Assay tests for 66 bacterial and resistance gene targets.  Please refer to the [facility] Labs test directory  at [email or web address] for details.  --  Blood Culture PCR  Staphylococcus aureus    (otherwise reviewed)    RADIOLOGY:    12/28 CT:    IMPRESSION:  Left psoas abscess decreased in size.

## 2021-12-30 LAB
ALBUMIN SERPL ELPH-MCNC: 2.8 G/DL — LOW (ref 3.3–5)
ALP SERPL-CCNC: 74 U/L — SIGNIFICANT CHANGE UP (ref 40–120)
ALT FLD-CCNC: 13 U/L — SIGNIFICANT CHANGE UP (ref 4–41)
ANION GAP SERPL CALC-SCNC: 12 MMOL/L — SIGNIFICANT CHANGE UP (ref 7–14)
AST SERPL-CCNC: 16 U/L — SIGNIFICANT CHANGE UP (ref 4–40)
BASOPHILS # BLD AUTO: 0.05 K/UL — SIGNIFICANT CHANGE UP (ref 0–0.2)
BASOPHILS NFR BLD AUTO: 0.8 % — SIGNIFICANT CHANGE UP (ref 0–2)
BILIRUB SERPL-MCNC: 0.3 MG/DL — SIGNIFICANT CHANGE UP (ref 0.2–1.2)
BUN SERPL-MCNC: 10 MG/DL — SIGNIFICANT CHANGE UP (ref 7–23)
CALCIUM SERPL-MCNC: 8.8 MG/DL — SIGNIFICANT CHANGE UP (ref 8.4–10.5)
CHLORIDE SERPL-SCNC: 99 MMOL/L — SIGNIFICANT CHANGE UP (ref 98–107)
CO2 SERPL-SCNC: 25 MMOL/L — SIGNIFICANT CHANGE UP (ref 22–31)
CREAT SERPL-MCNC: 0.73 MG/DL — SIGNIFICANT CHANGE UP (ref 0.5–1.3)
EOSINOPHIL # BLD AUTO: 0.25 K/UL — SIGNIFICANT CHANGE UP (ref 0–0.5)
EOSINOPHIL NFR BLD AUTO: 4 % — SIGNIFICANT CHANGE UP (ref 0–6)
GLUCOSE SERPL-MCNC: 110 MG/DL — HIGH (ref 70–99)
HCT VFR BLD CALC: 34.4 % — LOW (ref 39–50)
HGB BLD-MCNC: 11.3 G/DL — LOW (ref 13–17)
IANC: 3.5 K/UL — SIGNIFICANT CHANGE UP (ref 1.5–8.5)
IMM GRANULOCYTES NFR BLD AUTO: 1.1 % — SIGNIFICANT CHANGE UP (ref 0–1.5)
LYMPHOCYTES # BLD AUTO: 1.87 K/UL — SIGNIFICANT CHANGE UP (ref 1–3.3)
LYMPHOCYTES # BLD AUTO: 30.1 % — SIGNIFICANT CHANGE UP (ref 13–44)
MAGNESIUM SERPL-MCNC: 2 MG/DL — SIGNIFICANT CHANGE UP (ref 1.6–2.6)
MCHC RBC-ENTMCNC: 30.5 PG — SIGNIFICANT CHANGE UP (ref 27–34)
MCHC RBC-ENTMCNC: 32.8 GM/DL — SIGNIFICANT CHANGE UP (ref 32–36)
MCV RBC AUTO: 92.7 FL — SIGNIFICANT CHANGE UP (ref 80–100)
MONOCYTES # BLD AUTO: 0.48 K/UL — SIGNIFICANT CHANGE UP (ref 0–0.9)
MONOCYTES NFR BLD AUTO: 7.7 % — SIGNIFICANT CHANGE UP (ref 2–14)
NEUTROPHILS # BLD AUTO: 3.5 K/UL — SIGNIFICANT CHANGE UP (ref 1.8–7.4)
NEUTROPHILS NFR BLD AUTO: 56.3 % — SIGNIFICANT CHANGE UP (ref 43–77)
NRBC # BLD: 0 /100 WBCS — SIGNIFICANT CHANGE UP
NRBC # FLD: 0 K/UL — SIGNIFICANT CHANGE UP
PHOSPHATE SERPL-MCNC: 2.6 MG/DL — SIGNIFICANT CHANGE UP (ref 2.5–4.5)
PLATELET # BLD AUTO: 706 K/UL — HIGH (ref 150–400)
POTASSIUM SERPL-MCNC: 3.8 MMOL/L — SIGNIFICANT CHANGE UP (ref 3.5–5.3)
POTASSIUM SERPL-SCNC: 3.8 MMOL/L — SIGNIFICANT CHANGE UP (ref 3.5–5.3)
PROT SERPL-MCNC: 7.3 G/DL — SIGNIFICANT CHANGE UP (ref 6–8.3)
RBC # BLD: 3.71 M/UL — LOW (ref 4.2–5.8)
RBC # FLD: 17.8 % — HIGH (ref 10.3–14.5)
SODIUM SERPL-SCNC: 136 MMOL/L — SIGNIFICANT CHANGE UP (ref 135–145)
WBC # BLD: 6.22 K/UL — SIGNIFICANT CHANGE UP (ref 3.8–10.5)
WBC # FLD AUTO: 6.22 K/UL — SIGNIFICANT CHANGE UP (ref 3.8–10.5)

## 2021-12-30 PROCEDURE — 99232 SBSQ HOSP IP/OBS MODERATE 35: CPT | Mod: GC

## 2021-12-30 PROCEDURE — 99232 SBSQ HOSP IP/OBS MODERATE 35: CPT

## 2021-12-30 RX ADMIN — NAFCILLIN 200 GRAM(S): 10 INJECTION, POWDER, FOR SOLUTION INTRAVENOUS at 05:37

## 2021-12-30 RX ADMIN — PANTOPRAZOLE SODIUM 40 MILLIGRAM(S): 20 TABLET, DELAYED RELEASE ORAL at 05:37

## 2021-12-30 RX ADMIN — Medication 100 MILLIGRAM(S): at 10:46

## 2021-12-30 RX ADMIN — SENNA PLUS 2 TABLET(S): 8.6 TABLET ORAL at 21:05

## 2021-12-30 RX ADMIN — NAFCILLIN 200 GRAM(S): 10 INJECTION, POWDER, FOR SOLUTION INTRAVENOUS at 14:12

## 2021-12-30 RX ADMIN — NAFCILLIN 200 GRAM(S): 10 INJECTION, POWDER, FOR SOLUTION INTRAVENOUS at 10:46

## 2021-12-30 RX ADMIN — NAFCILLIN 200 GRAM(S): 10 INJECTION, POWDER, FOR SOLUTION INTRAVENOUS at 02:18

## 2021-12-30 RX ADMIN — NAFCILLIN 200 GRAM(S): 10 INJECTION, POWDER, FOR SOLUTION INTRAVENOUS at 21:05

## 2021-12-30 RX ADMIN — NAFCILLIN 200 GRAM(S): 10 INJECTION, POWDER, FOR SOLUTION INTRAVENOUS at 18:24

## 2021-12-30 RX ADMIN — Medication 100 MILLIGRAM(S): at 18:24

## 2021-12-30 RX ADMIN — CHLORHEXIDINE GLUCONATE 1 APPLICATION(S): 213 SOLUTION TOPICAL at 14:12

## 2021-12-30 NOTE — PROGRESS NOTE ADULT - SUBJECTIVE AND OBJECTIVE BOX
Peter Herbert MD  [phone]/[facility] [id number]      PROGRESS NOTE:     Patient is a 66y old  Male who presents with a chief complaint of Cervical and Lumbar Osteomyelitis (29 Dec 2021 10:36)      SUBJECTIVE / OVERNIGHT EVENTS:    OVERNIGHT:       Patient examined at bedside        MEDICATIONS  (STANDING):  chlorhexidine 2% Cloths 1 Application(s) Topical daily  influenza  Vaccine (HIGH DOSE) 0.7 milliLiter(s) IntraMuscular once  nafcillin  IVPB      nafcillin  IVPB 2 Gram(s) IV Intermittent every 4 hours  pantoprazole    Tablet 40 milliGRAM(s) Oral before breakfast  senna 2 Tablet(s) Oral at bedtime    MEDICATIONS  (PRN):  acetaminophen     Tablet .. 650 milliGRAM(s) Oral every 6 hours PRN Temp greater or equal to 38C (100.4F), Mild Pain (1 - 3)  benzonatate 100 milliGRAM(s) Oral every 8 hours PRN Cough  cyclobenzaprine 5 milliGRAM(s) Oral every 8 hours PRN Muscle Spasm  guaiFENesin Oral Liquid (Sugar-Free) 100 milliGRAM(s) Oral every 6 hours PRN Cough      CAPILLARY BLOOD GLUCOSE        I&O's Summary    29 Dec 2021 07:01  -  30 Dec 2021 07:00  --------------------------------------------------------  IN: 0 mL / OUT: 1200 mL / NET: -1200 mL        PHYSICAL EXAM:  Vital Signs Last 24 Hrs  T(C): 37.3 (29 Dec 2021 22:04), Max: 37.3 (29 Dec 2021 10:43)  T(F): 99.1 (29 Dec 2021 22:04), Max: 99.2 (29 Dec 2021 10:43)  HR: 91 (29 Dec 2021 22:04) (80 - 91)  BP: 117/69 (29 Dec 2021 22:04) (111/60 - 125/66)  BP(mean): --  RR: 18 (29 Dec 2021 22:04) (16 - 18)  SpO2: 100% (29 Dec 2021 22:04) (100% - 100%)    CONSTITUTIONAL: NAD; well-developed  HEENT: PERRL, clear conjunctiva  RESPIRATORY: Normal respiratory effort; lungs are clear to auscultation bilaterally; No Crackles/Rhonchi/Wheezing  CARDIOVASCULAR: Regular rate and rhythm, normal S1 and S2, no murmur/rub/gallop; No lower extremity edema; Peripheral pulses are 2+ bilaterally  ABDOMEN: Nontender to palpation, normoactive bowel sounds, no rebound/guarding; No hepatosplenomegaly  MUSCLOSKELETAL: no clubbing or cyanosis of digits; no joint swelling or tenderness to palpation  EXTREMITY: Lower extremities Non-tender to palpation; non-erythematous B/L  Neuro: A&Ox3; no focal deficits   Psych: normal mood; Affect appropirate    LABS:                        11.4   8.39  )-----------( 748      ( 29 Dec 2021 07:07 )             32.9     12-29    138  |  101  |  12  ----------------------------<  88  4.4   |  25  |  0.72    Ca    9.0      29 Dec 2021 07:07  Phos  2.6     12-29  Mg     2.00     12-29    TPro  7.5  /  Alb  3.0<L>  /  TBili  0.2  /  DBili  x   /  AST  16  /  ALT  11  /  AlkPhos  77  12-29                RADIOLOGY & ADDITIONAL TESTS:  Results Reviewed:   Imaging Personally Reviewed:  Electrocardiogram Personally Reviewed:    COORDINATION OF CARE:  Care Discussed with Consultants/Other Providers [Y/N]:  Prior or Outpatient Records Reviewed [Y/N]:   Peter Herbert MD  [phone]/[facility] [id number]      PROGRESS NOTE:     Patient is a 66y old  Male who presents with a chief complaint of Cervical and Lumbar Osteomyelitis (29 Dec 2021 10:36)      SUBJECTIVE / OVERNIGHT EVENTS:    OVERNIGHT:   - Overnight events     Patient was seen and examined at bedside this morning. Denies any nausea/vomiting/diarrhea, headache, shortness of breath, abdominal pain or chest pain/palpitations. Patient responding appropriately to questions and able to make needs known. Vital signs/imaging reviewed.        MEDICATIONS  (STANDING):  chlorhexidine 2% Cloths 1 Application(s) Topical daily  influenza  Vaccine (HIGH DOSE) 0.7 milliLiter(s) IntraMuscular once  nafcillin  IVPB      nafcillin  IVPB 2 Gram(s) IV Intermittent every 4 hours  pantoprazole    Tablet 40 milliGRAM(s) Oral before breakfast  senna 2 Tablet(s) Oral at bedtime    MEDICATIONS  (PRN):  acetaminophen     Tablet .. 650 milliGRAM(s) Oral every 6 hours PRN Temp greater or equal to 38C (100.4F), Mild Pain (1 - 3)  benzonatate 100 milliGRAM(s) Oral every 8 hours PRN Cough  cyclobenzaprine 5 milliGRAM(s) Oral every 8 hours PRN Muscle Spasm  guaiFENesin Oral Liquid (Sugar-Free) 100 milliGRAM(s) Oral every 6 hours PRN Cough      CAPILLARY BLOOD GLUCOSE        I&O's Summary    29 Dec 2021 07:01  -  30 Dec 2021 07:00  --------------------------------------------------------  IN: 0 mL / OUT: 1200 mL / NET: -1200 mL        PHYSICAL EXAM:  Vital Signs Last 24 Hrs  T(C): 37.3 (29 Dec 2021 22:04), Max: 37.3 (29 Dec 2021 10:43)  T(F): 99.1 (29 Dec 2021 22:04), Max: 99.2 (29 Dec 2021 10:43)  HR: 91 (29 Dec 2021 22:04) (80 - 91)  BP: 117/69 (29 Dec 2021 22:04) (111/60 - 125/66)  BP(mean): --  RR: 18 (29 Dec 2021 22:04) (16 - 18)  SpO2: 100% (29 Dec 2021 22:04) (100% - 100%)    CONSTITUTIONAL: NAD; well-developed; C collar   HEENT: PERRL, clear conjunctiva  RESPIRATORY: Normal respiratory effort; lungs are clear to auscultation bilaterally; No Crackles/Rhonchi/Wheezing  CARDIOVASCULAR: Regular rate and rhythm, normal S1 and S2, no murmur/rub/gallop; No lower extremity edema; Peripheral pulses are 2+ bilaterally  ABDOMEN: Nontender to palpation, normoactive bowel sounds, no rebound/guarding; No hepatosplenomegaly  MUSCLOSKELETAL: no clubbing or cyanosis of digits; no joint swelling or tenderness to palpation  EXTREMITY: Lower extremities Non-tender to palpation; non-erythematous B/L  Neuro: A&Ox3; no focal deficits   Psych: normal mood; Affect appropirate    LABS:                        11.4   8.39  )-----------( 748      ( 29 Dec 2021 07:07 )             32.9     12-29    138  |  101  |  12  ----------------------------<  88  4.4   |  25  |  0.72    Ca    9.0      29 Dec 2021 07:07  Phos  2.6     12-29  Mg     2.00     12-29    TPro  7.5  /  Alb  3.0<L>  /  TBili  0.2  /  DBili  x   /  AST  16  /  ALT  11  /  AlkPhos  77  12-29                RADIOLOGY & ADDITIONAL TESTS:  Results Reviewed:   Imaging Personally Reviewed:  Electrocardiogram Personally Reviewed:    COORDINATION OF CARE:  Care Discussed with Consultants/Other Providers [Y/N]:  Prior or Outpatient Records Reviewed [Y/N]:

## 2021-12-30 NOTE — PROGRESS NOTE ADULT - SUBJECTIVE AND OBJECTIVE BOX
CC: COVID    Saw/spoke to patient. No fevers, no chills. No new complaints. Unchanged.    Allergies  No Known Allergies    ANTIMICROBIALS:  nafcillin  IVPB    nafcillin  IVPB 2 every 4 hours    PE:    Vital Signs Last 24 Hrs  T(C): 36.9 (30 Dec 2021 10:29), Max: 37.3 (29 Dec 2021 22:04)  T(F): 98.4 (30 Dec 2021 10:29), Max: 99.1 (29 Dec 2021 22:04)  HR: 82 (30 Dec 2021 10:29) (78 - 91)  BP: 127/87 (30 Dec 2021 10:29) (111/60 - 127/87)  RR: 18 (30 Dec 2021 10:29) (16 - 18)  SpO2: 100% (30 Dec 2021 10:29) (100% - 100%)    Gen: AOx3, NAD, non-toxic, pleasant  CV: S1+S2 normal, nontachycardic  Resp: Clear bilat, no resp distress, no crackles/wheezes, RA  Abd: Soft, nontender, +BS  Ext: No LE edema, no wounds    LABS:                        11.3   6.22  )-----------( 706      ( 30 Dec 2021 07:28 )             34.4     12-30    136  |  99  |  10  ----------------------------<  110<H>  3.8   |  25  |  0.73    Ca    8.8      30 Dec 2021 07:28  Phos  2.6     12-30  Mg     2.00     12-30    TPro  7.3  /  Alb  2.8<L>  /  TBili  0.3  /  DBili  x   /  AST  16  /  ALT  13  /  AlkPhos  74  12-30    MICROBIOLOGY:    .Blood Blood-Peripheral  12-20-21   No Growth Final     .Blood Blood-Peripheral  12-20-21   No Growth Final      .Blood Blood-Venous  12-12-21   No Growth Final     .Blood Blood-Venous  12-12-21   No Growth Final     Clean Catch Clean Catch (Midstream)  12-10-21   >100,000 CFU/ml Escherichia coli ESBL  --  Escherichia coli ESBL    .Blood Blood-Peripheral  12-10-21   Growth in aerobic bottle: Staphylococcus aureus  See previous culture [id number]  Growth in anaerobic bottle: Staphylococcus epidermidis  Coag Negative Staphylococcus  Single set isolate, possible contaminant. Contact  Microbiology if susceptibility testing clinically  indicated.  --    Growth in aerobic and anaerobic bottles: Gram Positive Cocci in Clusters    .Blood Blood-Venous  12-10-21   Growth in aerobic and anaerobic bottles: Staphylococcus aureus  ***Blood Panel PCR results on this specimen are available  approximately 3 hours after the Gram stain result.***  Gram stain, PCR, and/or culture results may not always  correspond dueto difference in methodologies.  ************************************************************  This PCR assay was performed by multiplex PCR. This  Assay tests for 66 bacterial and resistance gene targets.  Please refer to the [facility] Labs test directory  at [email or web address] for details.  --  Blood Culture PCR  Staphylococcus aureus    (otherwise reviewed)    RADIOLOGY:    12/28 CT:    FINDINGS:  LOWER CHEST: Scattered areas of subsegmental atelectasis.    LIVER: Subcentimeter hypodensity too small to characterize.  BILE DUCTS: Normal caliber.  GALLBLADDER: Contracted.  SPLEEN: Within normal limits.  PANCREAS: Within normal limits.  ADRENALS: Within normal limits.  KIDNEYS/URETERS: No hydronephrosis. Bilateral subcentimeter renal   hypodensities too small to characterize.    BLADDER: Within normal limits.  REPRODUCTIVE ORGANS: Prostate is enlarged.    BOWEL: No bowel obstruction. Appendix is normal.  PERITONEUM: No ascites.  VESSELS: Atherosclerotic changes.  RETROPERITONEUM/LYMPH NODES: No lymphadenopathy.  ABDOMINAL WALL: Left psoas abscess decreased in size measuring 2.0 x 0.7   cm, previously 3.1 x 1.5 cm.  BONES: Degenerative changes. L3-L4 endplate sclerosis and irregularity,   suggesting discitis/osteomyelitis as noted on recent MR lumbar spine   12/12/2021.    IMPRESSION:  Left psoas abscess decreased in size.

## 2021-12-30 NOTE — PROGRESS NOTE ADULT - ASSESSMENT
66 M with T2DM and HTN with ED visit 2 days prior to presentation, found to have ESBL UTI and MSSA bacteremia and started on cefpodoxime, presented for worsening back pain a/w weakness, found to have b/l psoas abscesses and cervical and lumbar osteomyelitis  LE weakness, MSSA bacteremia, cervical and lumbar osteomyelitis, psoas abscess, s/p C3-C6 laminectomy 12/14  COVID+ - s/p monoclonal ab infusion 12/21  TTE generally reassuring  Remains stable from COVID perspective  Overall,  1) MSSA Bacteremia  - Concern for OM/psoas abscess; MSSA on BCXs, repeats clear  - Nafcillin 2g q 4 through 2/21/21  - TTE reassuring, hold on GIGI as long as stable clinical status (will need prolonged course abx regardless)  2) OM/Discitis  - S/p laminectomy  - Wound care per surgery team  3) COVID  - RA, asymptomatic but nosocomial?  - S/p Monoclonal  - Hold on RemD/Dexa unless progressive O2 requirements  - Check CXR    Marc Cameron MD  Pager [phone]  From 5pm-9am, and on weekends call [phone]

## 2021-12-30 NOTE — PROGRESS NOTE ADULT - ATTENDING COMMENTS
65 yo M with pmh of DM and Hypertension, presented with back pain and found to have discitis/OM now s/p laminectomy and [facility] and transferred back to [facility] for further management. Sepsis, resolved. 2/2 OM + psoas abscess. BCx 12/10 with MSSA and S. epidermidis. No intervention for psoas abscess. S/p laminectomy C3-C6 on 12/14. C/w nafcillin 2g q4 hours. Echo normal but did not exclude endocarditis. ID following. Will need total 8 week course of antibiotics.  now COVID+, received mAb 12/21. blood cx 12/20 NGTD. Repeat CT a/p showed improved psaos abscess. PT now rec home with outpt PT- will f/u CM for possible sher home abx infusion given lack of insurance.

## 2021-12-31 LAB
ALBUMIN SERPL ELPH-MCNC: 3 G/DL — LOW (ref 3.3–5)
ALP SERPL-CCNC: 74 U/L — SIGNIFICANT CHANGE UP (ref 40–120)
ALT FLD-CCNC: 10 U/L — SIGNIFICANT CHANGE UP (ref 4–41)
ANION GAP SERPL CALC-SCNC: 13 MMOL/L — SIGNIFICANT CHANGE UP (ref 7–14)
AST SERPL-CCNC: 13 U/L — SIGNIFICANT CHANGE UP (ref 4–40)
BASOPHILS # BLD AUTO: 0.05 K/UL — SIGNIFICANT CHANGE UP (ref 0–0.2)
BASOPHILS NFR BLD AUTO: 0.7 % — SIGNIFICANT CHANGE UP (ref 0–2)
BILIRUB SERPL-MCNC: 0.3 MG/DL — SIGNIFICANT CHANGE UP (ref 0.2–1.2)
BUN SERPL-MCNC: 12 MG/DL — SIGNIFICANT CHANGE UP (ref 7–23)
CALCIUM SERPL-MCNC: 8.9 MG/DL — SIGNIFICANT CHANGE UP (ref 8.4–10.5)
CHLORIDE SERPL-SCNC: 102 MMOL/L — SIGNIFICANT CHANGE UP (ref 98–107)
CO2 SERPL-SCNC: 22 MMOL/L — SIGNIFICANT CHANGE UP (ref 22–31)
CREAT SERPL-MCNC: 0.77 MG/DL — SIGNIFICANT CHANGE UP (ref 0.5–1.3)
EOSINOPHIL # BLD AUTO: 0.34 K/UL — SIGNIFICANT CHANGE UP (ref 0–0.5)
EOSINOPHIL NFR BLD AUTO: 4.7 % — SIGNIFICANT CHANGE UP (ref 0–6)
GLUCOSE SERPL-MCNC: 100 MG/DL — HIGH (ref 70–99)
HCT VFR BLD CALC: 31.4 % — LOW (ref 39–50)
HGB BLD-MCNC: 10.2 G/DL — LOW (ref 13–17)
IANC: 3.89 K/UL — SIGNIFICANT CHANGE UP (ref 1.5–8.5)
IMM GRANULOCYTES NFR BLD AUTO: 1.2 % — SIGNIFICANT CHANGE UP (ref 0–1.5)
LYMPHOCYTES # BLD AUTO: 2.18 K/UL — SIGNIFICANT CHANGE UP (ref 1–3.3)
LYMPHOCYTES # BLD AUTO: 30.2 % — SIGNIFICANT CHANGE UP (ref 13–44)
MAGNESIUM SERPL-MCNC: 2 MG/DL — SIGNIFICANT CHANGE UP (ref 1.6–2.6)
MCHC RBC-ENTMCNC: 30.8 PG — SIGNIFICANT CHANGE UP (ref 27–34)
MCHC RBC-ENTMCNC: 32.5 GM/DL — SIGNIFICANT CHANGE UP (ref 32–36)
MCV RBC AUTO: 94.9 FL — SIGNIFICANT CHANGE UP (ref 80–100)
MONOCYTES # BLD AUTO: 0.67 K/UL — SIGNIFICANT CHANGE UP (ref 0–0.9)
MONOCYTES NFR BLD AUTO: 9.3 % — SIGNIFICANT CHANGE UP (ref 2–14)
NEUTROPHILS # BLD AUTO: 3.89 K/UL — SIGNIFICANT CHANGE UP (ref 1.8–7.4)
NEUTROPHILS NFR BLD AUTO: 53.9 % — SIGNIFICANT CHANGE UP (ref 43–77)
NRBC # BLD: 0 /100 WBCS — SIGNIFICANT CHANGE UP
NRBC # FLD: 0 K/UL — SIGNIFICANT CHANGE UP
PHOSPHATE SERPL-MCNC: 2.8 MG/DL — SIGNIFICANT CHANGE UP (ref 2.5–4.5)
PLATELET # BLD AUTO: 622 K/UL — HIGH (ref 150–400)
POTASSIUM SERPL-MCNC: 4.1 MMOL/L — SIGNIFICANT CHANGE UP (ref 3.5–5.3)
POTASSIUM SERPL-SCNC: 4.1 MMOL/L — SIGNIFICANT CHANGE UP (ref 3.5–5.3)
PROT SERPL-MCNC: 6.7 G/DL — SIGNIFICANT CHANGE UP (ref 6–8.3)
RBC # BLD: 3.31 M/UL — LOW (ref 4.2–5.8)
RBC # FLD: 17.9 % — HIGH (ref 10.3–14.5)
SODIUM SERPL-SCNC: 137 MMOL/L — SIGNIFICANT CHANGE UP (ref 135–145)
WBC # BLD: 7.22 K/UL — SIGNIFICANT CHANGE UP (ref 3.8–10.5)
WBC # FLD AUTO: 7.22 K/UL — SIGNIFICANT CHANGE UP (ref 3.8–10.5)

## 2021-12-31 PROCEDURE — 99232 SBSQ HOSP IP/OBS MODERATE 35: CPT | Mod: GC

## 2021-12-31 RX ADMIN — NAFCILLIN 200 GRAM(S): 10 INJECTION, POWDER, FOR SOLUTION INTRAVENOUS at 17:26

## 2021-12-31 RX ADMIN — NAFCILLIN 200 GRAM(S): 10 INJECTION, POWDER, FOR SOLUTION INTRAVENOUS at 21:34

## 2021-12-31 RX ADMIN — SENNA PLUS 2 TABLET(S): 8.6 TABLET ORAL at 21:34

## 2021-12-31 RX ADMIN — Medication 100 MILLIGRAM(S): at 05:27

## 2021-12-31 RX ADMIN — Medication 650 MILLIGRAM(S): at 08:37

## 2021-12-31 RX ADMIN — NAFCILLIN 200 GRAM(S): 10 INJECTION, POWDER, FOR SOLUTION INTRAVENOUS at 05:25

## 2021-12-31 RX ADMIN — NAFCILLIN 200 GRAM(S): 10 INJECTION, POWDER, FOR SOLUTION INTRAVENOUS at 11:03

## 2021-12-31 RX ADMIN — PANTOPRAZOLE SODIUM 40 MILLIGRAM(S): 20 TABLET, DELAYED RELEASE ORAL at 05:23

## 2021-12-31 RX ADMIN — NAFCILLIN 200 GRAM(S): 10 INJECTION, POWDER, FOR SOLUTION INTRAVENOUS at 13:37

## 2021-12-31 RX ADMIN — CHLORHEXIDINE GLUCONATE 1 APPLICATION(S): 213 SOLUTION TOPICAL at 11:59

## 2021-12-31 RX ADMIN — NAFCILLIN 200 GRAM(S): 10 INJECTION, POWDER, FOR SOLUTION INTRAVENOUS at 01:03

## 2021-12-31 RX ADMIN — Medication 650 MILLIGRAM(S): at 09:07

## 2021-12-31 NOTE — PROGRESS NOTE ADULT - ATTENDING COMMENTS
66M with no known PMH here w/ cervical spine OM w/ phlegmon, discitis s/p lami and PSF.  Pt seen at bedside. Tolerating meds/PO. Reports occ back pain, but has been tolerable.    -Continue nafcillin as ordered.  -Labs have remained stable.   -Dispo challenging due to uninsured status. Needs services to be arranged. Cont to f/u CM/SW.    Rest of plan as above.

## 2021-12-31 NOTE — PROGRESS NOTE ADULT - PROBLEM SELECTOR PLAN 2
MRI w/ discitis/ osteomyelitis in C4/C5 with prevertebral/ phlegmon w/ concern for developing abscess extending from C2 to C6 levels and discitis/ osteomyelitis in L3/L4 level and an extensive paravertebral phlegmon across L3-4.  Now s/p C3-C6 laminectomy w/ PSF by ortho spine.   - Continue Nafcillin 2g Q4hrs - patient will need this for 8 weeks per ID from ( 12/14 - )   - Will need PICC day before discharge  - Will need to be afebrile 24Hours before PICC   - F/U with SW/CM for dispo  - Activity as tolerated.  - Incentive spirometer  - As per ortho,  keep C-collar until 1/11/22 COVID +, Patient on RA with dry cough. Not experiencing SOB, or CP.  S/P Casirivimab/imdevimab  - Monitor symptoms   - Cough medicine PRN  - Tylenol prn

## 2021-12-31 NOTE — PROGRESS NOTE ADULT - PROBLEM SELECTOR PLAN 1
COVID +, Patient on RA with dry cough. Not experiencing SOB, or CP.  S/P Casirivimab/imdevimab  - On isolation/droplet precautions   - Monitor symptoms   - Cough medicine PRN  - Tylenol prn MRI w/ discitis/ osteomyelitis in C4/C5 with prevertebral/ phlegmon w/ concern for developing abscess extending from C2 to C6 levels and discitis/ osteomyelitis in L3/L4 level and an extensive paravertebral phlegmon across L3-4.  Now s/p C3-C6 laminectomy w/ PSF by ortho spine.   - Continue Nafcillin 2g Q4hrs - patient will need this for 8 weeks per ID from ( 12/14 - )   - Will need PICC day before discharge  - Will need to be afebrile 24Hours before PICC   - F/U with SW/CM for dispo potentially early next week. As per CM note, family to privately pay for ABX   - Activity as tolerated.  - Incentive spirometer  - As per ortho,  keep C-collar until 1/11/22

## 2021-12-31 NOTE — PROGRESS NOTE ADULT - PROBLEM SELECTOR PLAN 4
Diet: Consistent Carb  DVT: SCDs - chemical prophylaxis contraindicated   Dispo: Home   - Patient has no insurance, will f/u w SW/CM about home dispo with PICC Diet: Consistent Carb  DVT: SCDs - chemical prophylaxis contraindicated   Dispo: Home

## 2021-12-31 NOTE — PROGRESS NOTE ADULT - SUBJECTIVE AND OBJECTIVE BOX
Peter Herbert MD  [phone]/[facility] [id number]      PROGRESS NOTE:     Patient is a 66y old  Male who presents with a chief complaint of Cervical and Lumbar Osteomyelitis (30 Dec 2021 07:26)      SUBJECTIVE / OVERNIGHT EVENTS:    OVERNIGHT:       Patient examined at bedside        MEDICATIONS  (STANDING):  chlorhexidine 2% Cloths 1 Application(s) Topical daily  influenza  Vaccine (HIGH DOSE) 0.7 milliLiter(s) IntraMuscular once  nafcillin  IVPB      nafcillin  IVPB 2 Gram(s) IV Intermittent every 4 hours  pantoprazole    Tablet 40 milliGRAM(s) Oral before breakfast  senna 2 Tablet(s) Oral at bedtime    MEDICATIONS  (PRN):  acetaminophen     Tablet .. 650 milliGRAM(s) Oral every 6 hours PRN Temp greater or equal to 38C (100.4F), Mild Pain (1 - 3)  benzonatate 100 milliGRAM(s) Oral every 8 hours PRN Cough  cyclobenzaprine 5 milliGRAM(s) Oral every 8 hours PRN Muscle Spasm  guaiFENesin Oral Liquid (Sugar-Free) 100 milliGRAM(s) Oral every 6 hours PRN Cough      CAPILLARY BLOOD GLUCOSE        I&O's Summary    29 Dec 2021 07:01  -  30 Dec 2021 07:00  --------------------------------------------------------  IN: 0 mL / OUT: 1450 mL / NET: -1450 mL    30 Dec 2021 07:01  -  31 Dec 2021 06:48  --------------------------------------------------------  IN: 0 mL / OUT: 600 mL / NET: -600 mL        PHYSICAL EXAM:  Vital Signs Last 24 Hrs  T(C): 36.7 (31 Dec 2021 05:29), Max: 37.3 (30 Dec 2021 17:07)  T(F): 98 (31 Dec 2021 05:29), Max: 99.2 (30 Dec 2021 17:07)  HR: 76 (31 Dec 2021 05:29) (76 - 98)  BP: 117/69 (31 Dec 2021 05:29) (117/69 - 127/87)  BP(mean): --  RR: 16 (31 Dec 2021 05:29) (16 - 18)  SpO2: 99% (31 Dec 2021 05:29) (99% - 100%)    CONSTITUTIONAL: NAD; well-developed  HEENT: PERRL, clear conjunctiva  RESPIRATORY: Normal respiratory effort; lungs are clear to auscultation bilaterally; No Crackles/Rhonchi/Wheezing  CARDIOVASCULAR: Regular rate and rhythm, normal S1 and S2, no murmur/rub/gallop; No lower extremity edema; Peripheral pulses are 2+ bilaterally  ABDOMEN: Nontender to palpation, normoactive bowel sounds, no rebound/guarding; No hepatosplenomegaly  MUSCLOSKELETAL: no clubbing or cyanosis of digits; no joint swelling or tenderness to palpation  EXTREMITY: Lower extremities Non-tender to palpation; non-erythematous B/L  Neuro: A&Ox3; no focal deficits   Psych: normal mood; Affect appropirate    LABS:                        11.3   6.22  )-----------( 706      ( 30 Dec 2021 07:28 )             34.4     12-30    136  |  99  |  10  ----------------------------<  110<H>  3.8   |  25  |  0.73    Ca    8.8      30 Dec 2021 07:28  Phos  2.6     12-30  Mg     2.00     12-30    TPro  7.3  /  Alb  2.8<L>  /  TBili  0.3  /  DBili  x   /  AST  16  /  ALT  13  /  AlkPhos  74  12-30                RADIOLOGY & ADDITIONAL TESTS:  Results Reviewed:   Imaging Personally Reviewed:  Electrocardiogram Personally Reviewed:    COORDINATION OF CARE:  Care Discussed with Consultants/Other Providers [Y/N]:  Prior or Outpatient Records Reviewed [Y/N]:   Peter Herbert MD  [phone]/[facility] [id number]      PROGRESS NOTE:     Patient is a 66y old  Male who presents with a chief complaint of Cervical and Lumbar Osteomyelitis (30 Dec 2021 07:26)      SUBJECTIVE / OVERNIGHT EVENTS:    OVERNIGHT:   - No overnight events     Patient was seen and examined at bedside this morning. Denies any nausea/vomiting/diarrhea, headache, shortness of breath, abdominal pain or chest pain/palpitations. Patient responding appropriately to questions and able to make needs known. Vital signs/imaging reviewed. Patient still has cough.         MEDICATIONS  (STANDING):  chlorhexidine 2% Cloths 1 Application(s) Topical daily  influenza  Vaccine (HIGH DOSE) 0.7 milliLiter(s) IntraMuscular once  nafcillin  IVPB      nafcillin  IVPB 2 Gram(s) IV Intermittent every 4 hours  pantoprazole    Tablet 40 milliGRAM(s) Oral before breakfast  senna 2 Tablet(s) Oral at bedtime    MEDICATIONS  (PRN):  acetaminophen     Tablet .. 650 milliGRAM(s) Oral every 6 hours PRN Temp greater or equal to 38C (100.4F), Mild Pain (1 - 3)  benzonatate 100 milliGRAM(s) Oral every 8 hours PRN Cough  cyclobenzaprine 5 milliGRAM(s) Oral every 8 hours PRN Muscle Spasm  guaiFENesin Oral Liquid (Sugar-Free) 100 milliGRAM(s) Oral every 6 hours PRN Cough      CAPILLARY BLOOD GLUCOSE        I&O's Summary    29 Dec 2021 07:01  -  30 Dec 2021 07:00  --------------------------------------------------------  IN: 0 mL / OUT: 1450 mL / NET: -1450 mL    30 Dec 2021 07:01  -  31 Dec 2021 06:48  --------------------------------------------------------  IN: 0 mL / OUT: 600 mL / NET: -600 mL        PHYSICAL EXAM:  Vital Signs Last 24 Hrs  T(C): 36.7 (31 Dec 2021 05:29), Max: 37.3 (30 Dec 2021 17:07)  T(F): 98 (31 Dec 2021 05:29), Max: 99.2 (30 Dec 2021 17:07)  HR: 76 (31 Dec 2021 05:29) (76 - 98)  BP: 117/69 (31 Dec 2021 05:29) (117/69 - 127/87)  BP(mean): --  RR: 16 (31 Dec 2021 05:29) (16 - 18)  SpO2: 99% (31 Dec 2021 05:29) (99% - 100%)    CONSTITUTIONAL: NAD; well-developed; C collar   HEENT: PERRL, clear conjunctiva  RESPIRATORY: Normal respiratory effort; lungs are clear to auscultation bilaterally; No Crackles/Rhonchi/Wheezing  CARDIOVASCULAR: Regular rate and rhythm, normal S1 and S2, no murmur/rub/gallop; No lower extremity edema; Peripheral pulses are 2+ bilaterally  ABDOMEN: Nontender to palpation, normoactive bowel sounds, no rebound/guarding; No hepatosplenomegaly  MUSCLOSKELETAL: no clubbing or cyanosis of digits; no joint swelling or tenderness to palpation  EXTREMITY: Lower extremities Non-tender to palpation; non-erythematous B/L  Neuro: A&Ox3; no focal deficits   Psych: normal mood; Affect appropirate  LABS:                        11.3   6.22  )-----------( 706      ( 30 Dec 2021 07:28 )             34.4     12-30    136  |  99  |  10  ----------------------------<  110<H>  3.8   |  25  |  0.73    Ca    8.8      30 Dec 2021 07:28  Phos  2.6     12-30  Mg     2.00     12-30    TPro  7.3  /  Alb  2.8<L>  /  TBili  0.3  /  DBili  x   /  AST  16  /  ALT  13  /  AlkPhos  74  12-30                RADIOLOGY & ADDITIONAL TESTS:  Results Reviewed:   Imaging Personally Reviewed:  Electrocardiogram Personally Reviewed:    COORDINATION OF CARE:  Care Discussed with Consultants/Other Providers [Y/N]:  Prior or Outpatient Records Reviewed [Y/N]:

## 2021-12-31 NOTE — PROGRESS NOTE ADULT - ASSESSMENT
66M w/o any known PMH presented s/p cervical laminectomy at [facility] for Osteo now on 8 weeks of Nafcillin , and COVID +. Patient will need PICC before discharge home. CM following.

## 2022-01-01 PROCEDURE — 99232 SBSQ HOSP IP/OBS MODERATE 35: CPT | Mod: GC

## 2022-01-01 RX ORDER — SODIUM CHLORIDE 9 MG/ML
500 INJECTION INTRAMUSCULAR; INTRAVENOUS; SUBCUTANEOUS ONCE
Refills: 0 | Status: COMPLETED | OUTPATIENT
Start: 2022-01-01 | End: 2022-01-01

## 2022-01-01 RX ADMIN — Medication 100 MILLIGRAM(S): at 06:08

## 2022-01-01 RX ADMIN — SODIUM CHLORIDE 500 MILLILITER(S): 9 INJECTION INTRAMUSCULAR; INTRAVENOUS; SUBCUTANEOUS at 23:49

## 2022-01-01 RX ADMIN — NAFCILLIN 200 GRAM(S): 10 INJECTION, POWDER, FOR SOLUTION INTRAVENOUS at 01:40

## 2022-01-01 RX ADMIN — NAFCILLIN 200 GRAM(S): 10 INJECTION, POWDER, FOR SOLUTION INTRAVENOUS at 17:45

## 2022-01-01 RX ADMIN — NAFCILLIN 200 GRAM(S): 10 INJECTION, POWDER, FOR SOLUTION INTRAVENOUS at 22:58

## 2022-01-01 RX ADMIN — NAFCILLIN 200 GRAM(S): 10 INJECTION, POWDER, FOR SOLUTION INTRAVENOUS at 05:36

## 2022-01-01 RX ADMIN — NAFCILLIN 200 GRAM(S): 10 INJECTION, POWDER, FOR SOLUTION INTRAVENOUS at 10:15

## 2022-01-01 RX ADMIN — NAFCILLIN 200 GRAM(S): 10 INJECTION, POWDER, FOR SOLUTION INTRAVENOUS at 14:32

## 2022-01-01 RX ADMIN — CHLORHEXIDINE GLUCONATE 1 APPLICATION(S): 213 SOLUTION TOPICAL at 12:41

## 2022-01-01 RX ADMIN — PANTOPRAZOLE SODIUM 40 MILLIGRAM(S): 20 TABLET, DELAYED RELEASE ORAL at 06:08

## 2022-01-01 NOTE — PROGRESS NOTE ADULT - PROBLEM SELECTOR PLAN 1
MRI w/ discitis/ osteomyelitis in C4/C5 with prevertebral/ phlegmon w/ concern for developing abscess extending from C2 to C6 levels and discitis/ osteomyelitis in L3/L4 level and an extensive paravertebral phlegmon across L3-4.  Now s/p C3-C6 laminectomy w/ PSF by ortho spine.   - Continue Nafcillin 2g Q4hrs - patient will need this for 8 weeks per ID from ( 12/14 - )   - Will need PICC day before discharge  - Will need to be afebrile 24Hours before PICC   - F/U with SW/CM for dispo potentially early next week. As per CM note, family to privately pay for ABX   - Activity as tolerated.  - Incentive spirometer  - As per ortho,  keep C-collar until 1/11/22 MRI w/ discitis/ osteomyelitis in C4/C5 with prevertebral/ phlegmon w/ concern for developing abscess extending from C2 to C6 levels and discitis/ osteomyelitis in L3/L4 level and an extensive paravertebral phlegmon across L3-4.  Now s/p C3-C6 laminectomy w/ PSF by ortho spine.   - Continue Nafcillin 2g Q4hrs - patient will need this for 8 weeks per ID from ( 12/14/2021 - 2/21/2022)   - Will need PICC on Monday AM before discharge   - Will need to be afebrile 24Hours before PICC   -  As per CM note, family to privately pay for ABX; Plan for discharge on Monday 1/3   - Activity as tolerated.  - Incentive spirometer  - As per ortho,  keep C-collar until 1/11/22

## 2022-01-01 NOTE — PROGRESS NOTE ADULT - ASSESSMENT
66M w/o any known PMH presented s/p cervical laminectomy at [facility] for Osteo now on 8 weeks of Nafcillin , and COVID +. Patient will need PICC before discharge home. CM following. Plan for discharge on Monday 66M w/o any known PMH presented s/p cervical laminectomy at [facility] for Osteo now on 8 weeks of Nafcillin , and COVID +. Patient will need PICC before discharge home. CM following. Plan for discharge on Monday.

## 2022-01-01 NOTE — PROGRESS NOTE ADULT - SUBJECTIVE AND OBJECTIVE BOX
Peter Herbert MD  [phone]/[id number]      PROGRESS NOTE:     Patient is a 66y old  Male who presents with a chief complaint of Cervical and Lumbar Osteomyelitis (31 Dec 2021 06:48)      SUBJECTIVE / OVERNIGHT EVENTS:    OVERNIGHT:       Patient examined at bedside        MEDICATIONS  (STANDING):  chlorhexidine 2% Cloths 1 Application(s) Topical daily  influenza  Vaccine (HIGH DOSE) 0.7 milliLiter(s) IntraMuscular once  nafcillin  IVPB      nafcillin  IVPB 2 Gram(s) IV Intermittent every 4 hours  pantoprazole    Tablet 40 milliGRAM(s) Oral before breakfast  senna 2 Tablet(s) Oral at bedtime    MEDICATIONS  (PRN):  acetaminophen     Tablet .. 650 milliGRAM(s) Oral every 6 hours PRN Temp greater or equal to 38C (100.4F), Mild Pain (1 - 3)  benzonatate 100 milliGRAM(s) Oral every 8 hours PRN Cough  cyclobenzaprine 5 milliGRAM(s) Oral every 8 hours PRN Muscle Spasm  guaiFENesin Oral Liquid (Sugar-Free) 100 milliGRAM(s) Oral every 6 hours PRN Cough      CAPILLARY BLOOD GLUCOSE        I&O's Summary    30 Dec 2021 07:01  -  31 Dec 2021 07:00  --------------------------------------------------------  IN: 0 mL / OUT: 1600 mL / NET: -1600 mL    31 Dec 2021 07:01  -  01 Jan 2022 06:56  --------------------------------------------------------  IN: 0 mL / OUT: 500 mL / NET: -500 mL        PHYSICAL EXAM:  Vital Signs Last 24 Hrs  T(C): 36.7 (01 Jan 2022 06:37), Max: 37.1 (31 Dec 2021 17:58)  T(F): 98.1 (01 Jan 2022 06:37), Max: 98.8 (31 Dec 2021 17:58)  HR: 80 (01 Jan 2022 06:37) (80 - 84)  BP: 118/82 (01 Jan 2022 06:37) (116/62 - 128/72)  BP(mean): --  RR: 18 (01 Jan 2022 06:37) (17 - 18)  SpO2: 100% (01 Jan 2022 06:37) (98% - 100%)    CONSTITUTIONAL: NAD; well-developed  HEENT: PERRL, clear conjunctiva  RESPIRATORY: Normal respiratory effort; lungs are clear to auscultation bilaterally; No Crackles/Rhonchi/Wheezing  CARDIOVASCULAR: Regular rate and rhythm, normal S1 and S2, no murmur/rub/gallop; No lower extremity edema; Peripheral pulses are 2+ bilaterally  ABDOMEN: Nontender to palpation, normoactive bowel sounds, no rebound/guarding; No hepatosplenomegaly  MUSCLOSKELETAL: no clubbing or cyanosis of digits; no joint swelling or tenderness to palpation  EXTREMITY: Lower extremities Non-tender to palpation; non-erythematous B/L  Neuro: A&Ox3; no focal deficits   Psych: normal mood; Affect appropirate    LABS:                        10.2   7.22  )-----------( 622      ( 31 Dec 2021 07:52 )             31.4     12-31    137  |  102  |  12  ----------------------------<  100<H>  4.1   |  22  |  0.77    Ca    8.9      31 Dec 2021 07:52  Phos  2.8     12-31  Mg     2.00     12-31    TPro  6.7  /  Alb  3.0<L>  /  TBili  0.3  /  DBili  x   /  AST  13  /  ALT  10  /  AlkPhos  74  12-31                RADIOLOGY & ADDITIONAL TESTS:  Results Reviewed:   Imaging Personally Reviewed:  Electrocardiogram Personally Reviewed:    COORDINATION OF CARE:  Care Discussed with Consultants/Other Providers [Y/N]:  Prior or Outpatient Records Reviewed [Y/N]:   Peter Herbert MD  [phone]/[facility] [id number]      PROGRESS NOTE:     Patient is a 66y old  Male who presents with a chief complaint of Cervical and Lumbar Osteomyelitis (31 Dec 2021 06:48)      SUBJECTIVE / OVERNIGHT EVENTS:    OVERNIGHT:   - No overnight events     Patient was seen and examined at bedside this morning. Denies any nausea/vomiting/diarrhea, headache, shortness of breath, abdominal pain or chest pain/palpitations. Patient responding appropriately to questions and able to make needs known. Vital signs/imaging. He is complaining of cough.          MEDICATIONS  (STANDING):  chlorhexidine 2% Cloths 1 Application(s) Topical daily  influenza  Vaccine (HIGH DOSE) 0.7 milliLiter(s) IntraMuscular once  nafcillin  IVPB      nafcillin  IVPB 2 Gram(s) IV Intermittent every 4 hours  pantoprazole    Tablet 40 milliGRAM(s) Oral before breakfast  senna 2 Tablet(s) Oral at bedtime    MEDICATIONS  (PRN):  acetaminophen     Tablet .. 650 milliGRAM(s) Oral every 6 hours PRN Temp greater or equal to 38C (100.4F), Mild Pain (1 - 3)  benzonatate 100 milliGRAM(s) Oral every 8 hours PRN Cough  cyclobenzaprine 5 milliGRAM(s) Oral every 8 hours PRN Muscle Spasm  guaiFENesin Oral Liquid (Sugar-Free) 100 milliGRAM(s) Oral every 6 hours PRN Cough      CAPILLARY BLOOD GLUCOSE        I&O's Summary    30 Dec 2021 07:01  -  31 Dec 2021 07:00  --------------------------------------------------------  IN: 0 mL / OUT: 1600 mL / NET: -1600 mL    31 Dec 2021 07:01  -  01 Jan 2022 06:56  --------------------------------------------------------  IN: 0 mL / OUT: 500 mL / NET: -500 mL        PHYSICAL EXAM:  Vital Signs Last 24 Hrs  T(C): 36.7 (01 Jan 2022 06:37), Max: 37.1 (31 Dec 2021 17:58)  T(F): 98.1 (01 Jan 2022 06:37), Max: 98.8 (31 Dec 2021 17:58)  HR: 80 (01 Jan 2022 06:37) (80 - 84)  BP: 118/82 (01 Jan 2022 06:37) (116/62 - 128/72)  BP(mean): --  RR: 18 (01 Jan 2022 06:37) (17 - 18)  SpO2: 100% (01 Jan 2022 06:37) (98% - 100%)    CONSTITUTIONAL: NAD; well-developed; C collar   HEENT: PERRL, clear conjunctiva  RESPIRATORY: Normal respiratory effort; lungs are clear to auscultation bilaterally; No Crackles/Rhonchi/Wheezing  CARDIOVASCULAR: Regular rate and rhythm, normal S1 and S2, no murmur/rub/gallop; No lower extremity edema; Peripheral pulses are 2+ bilaterally  ABDOMEN: Nontender to palpation, normoactive bowel sounds, no rebound/guarding; No hepatosplenomegaly  MUSCLOSKELETAL: no clubbing or cyanosis of digits; no joint swelling or tenderness to palpation  EXTREMITY: Lower extremities Non-tender to palpation; non-erythematous B/L  Neuro: A&Ox3; no focal deficits   Psych: normal mood; Affect appropirate    LABS:                        10.2   7.22  )-----------( 622      ( 31 Dec 2021 07:52 )             31.4     12-31    137  |  102  |  12  ----------------------------<  100<H>  4.1   |  22  |  0.77    Ca    8.9      31 Dec 2021 07:52  Phos  2.8     12-31  Mg     2.00     12-31    TPro  6.7  /  Alb  3.0<L>  /  TBili  0.3  /  DBili  x   /  AST  13  /  ALT  10  /  AlkPhos  74  12-31                RADIOLOGY & ADDITIONAL TESTS:  Results Reviewed:   Imaging Personally Reviewed:  Electrocardiogram Personally Reviewed:    COORDINATION OF CARE:  Care Discussed with Consultants/Other Providers [Y/N]:  Prior or Outpatient Records Reviewed [Y/N]:

## 2022-01-01 NOTE — PROGRESS NOTE ADULT - PROBLEM SELECTOR PLAN 2
COVID +, Patient on RA with dry cough. Not experiencing SOB, or CP.  S/P Casirivimab/imdevimab  - Monitor symptoms   - Cough medicine PRN  - Tylenol prn

## 2022-01-01 NOTE — PROGRESS NOTE ADULT - ATTENDING COMMENTS
66M with no known PMH here w/ cervical spine OM w/ phlegmon, discitis s/p lami and PSF.  Pt seen at bedside. Tolerating meds/PO. Reports occ back pain, but has been tolerable.    -Continue nafcillin as ordered.  -Labs have remained stable.   -Chalenging dispo due to need to arrange for services.. Cont to f/u CM/SW.    Rest of plan as above.

## 2022-01-02 DIAGNOSIS — M79.601 PAIN IN RIGHT ARM: ICD-10-CM

## 2022-01-02 PROCEDURE — 99232 SBSQ HOSP IP/OBS MODERATE 35: CPT | Mod: GC

## 2022-01-02 RX ADMIN — NAFCILLIN 200 GRAM(S): 10 INJECTION, POWDER, FOR SOLUTION INTRAVENOUS at 14:02

## 2022-01-02 RX ADMIN — NAFCILLIN 200 GRAM(S): 10 INJECTION, POWDER, FOR SOLUTION INTRAVENOUS at 22:01

## 2022-01-02 RX ADMIN — PANTOPRAZOLE SODIUM 40 MILLIGRAM(S): 20 TABLET, DELAYED RELEASE ORAL at 07:34

## 2022-01-02 RX ADMIN — NAFCILLIN 200 GRAM(S): 10 INJECTION, POWDER, FOR SOLUTION INTRAVENOUS at 17:48

## 2022-01-02 RX ADMIN — NAFCILLIN 200 GRAM(S): 10 INJECTION, POWDER, FOR SOLUTION INTRAVENOUS at 02:19

## 2022-01-02 RX ADMIN — NAFCILLIN 200 GRAM(S): 10 INJECTION, POWDER, FOR SOLUTION INTRAVENOUS at 10:19

## 2022-01-02 RX ADMIN — NAFCILLIN 200 GRAM(S): 10 INJECTION, POWDER, FOR SOLUTION INTRAVENOUS at 05:15

## 2022-01-02 RX ADMIN — CHLORHEXIDINE GLUCONATE 1 APPLICATION(S): 213 SOLUTION TOPICAL at 11:52

## 2022-01-02 NOTE — PROGRESS NOTE ADULT - ATTENDING COMMENTS
66M with no known PMH here w/ cervical spine OM w/ phlegmon, discitis s/p lami and PSF.  On nafcillin for MSSA. Tolerating abx well. Dispo challenging due to uninsured status.     -Continue nafcillin as ordered.  -Labs have remained stable.   -Cont to f/u CM/SW for safe DC planning. Will need durable IV upon readiness.    Rest of plan as above.

## 2022-01-02 NOTE — PROGRESS NOTE ADULT - SUBJECTIVE AND OBJECTIVE BOX
Peter Herbert MD  [phone]/[facility] [id number]      PROGRESS NOTE:     Patient is a 66y old  Male who presents with a chief complaint of Cervical and Lumbar Osteomyelitis (01 Jan 2022 06:55)      SUBJECTIVE / OVERNIGHT EVENTS:    OVERNIGHT:       Patient examined at bedside        MEDICATIONS  (STANDING):  chlorhexidine 2% Cloths 1 Application(s) Topical daily  influenza  Vaccine (HIGH DOSE) 0.7 milliLiter(s) IntraMuscular once  nafcillin  IVPB      nafcillin  IVPB 2 Gram(s) IV Intermittent every 4 hours  pantoprazole    Tablet 40 milliGRAM(s) Oral before breakfast  senna 2 Tablet(s) Oral at bedtime    MEDICATIONS  (PRN):  acetaminophen     Tablet .. 650 milliGRAM(s) Oral every 6 hours PRN Temp greater or equal to 38C (100.4F), Mild Pain (1 - 3)  benzonatate 100 milliGRAM(s) Oral every 8 hours PRN Cough  cyclobenzaprine 5 milliGRAM(s) Oral every 8 hours PRN Muscle Spasm  guaiFENesin Oral Liquid (Sugar-Free) 100 milliGRAM(s) Oral every 6 hours PRN Cough      CAPILLARY BLOOD GLUCOSE        I&O's Summary    31 Dec 2021 07:01  -  01 Jan 2022 07:00  --------------------------------------------------------  IN: 0 mL / OUT: 500 mL / NET: -500 mL        PHYSICAL EXAM:  Vital Signs Last 24 Hrs  T(C): 36.7 (02 Jan 2022 05:10), Max: 37.2 (01 Jan 2022 22:25)  T(F): 98.1 (02 Jan 2022 05:10), Max: 98.9 (01 Jan 2022 22:25)  HR: 84 (02 Jan 2022 05:10) (74 - 84)  BP: 107/76 (02 Jan 2022 05:10) (97/50 - 139/72)  BP(mean): 86 (01 Jan 2022 14:36) (86 - 86)  RR: 19 (02 Jan 2022 05:10) (18 - 19)  SpO2: 100% (02 Jan 2022 05:10) (100% - 100%)    CONSTITUTIONAL: NAD; well-developed  HEENT: PERRL, clear conjunctiva  RESPIRATORY: Normal respiratory effort; lungs are clear to auscultation bilaterally; No Crackles/Rhonchi/Wheezing  CARDIOVASCULAR: Regular rate and rhythm, normal S1 and S2, no murmur/rub/gallop; No lower extremity edema; Peripheral pulses are 2+ bilaterally  ABDOMEN: Nontender to palpation, normoactive bowel sounds, no rebound/guarding; No hepatosplenomegaly  MUSCLOSKELETAL: no clubbing or cyanosis of digits; no joint swelling or tenderness to palpation  EXTREMITY: Lower extremities Non-tender to palpation; non-erythematous B/L  Neuro: A&Ox3; no focal deficits   Psych: normal mood; Affect appropirate    LABS:                        10.2   7.22  )-----------( 622      ( 31 Dec 2021 07:52 )             31.4     12-31    137  |  102  |  12  ----------------------------<  100<H>  4.1   |  22  |  0.77    Ca    8.9      31 Dec 2021 07:52  Phos  2.8     12-31  Mg     2.00     12-31    TPro  6.7  /  Alb  3.0<L>  /  TBili  0.3  /  DBili  x   /  AST  13  /  ALT  10  /  AlkPhos  74  12-31                RADIOLOGY & ADDITIONAL TESTS:  Results Reviewed:   Imaging Personally Reviewed:  Electrocardiogram Personally Reviewed:    COORDINATION OF CARE:  Care Discussed with Consultants/Other Providers [Y/N]:  Prior or Outpatient Records Reviewed [Y/N]:   Peter Herbert MD  [phone]/[facility] [id number]      PROGRESS NOTE:     Patient is a 66y old  Male who presents with a chief complaint of Cervical and Lumbar Osteomyelitis (01 Jan 2022 06:55)      SUBJECTIVE / OVERNIGHT EVENTS:    OVERNIGHT:   - 500cc bolus     Patient was seen and examined at bedside this morning. Has some R upper Arm pain since yesterday. Denies any nausea/vomiting/diarrhea, headache, shortness of breath, abdominal pain or chest pain/palpitations. Patient responding appropriately to questions and able to make needs known. Vital signs/imaging reviewed.          MEDICATIONS  (STANDING):  chlorhexidine 2% Cloths 1 Application(s) Topical daily  influenza  Vaccine (HIGH DOSE) 0.7 milliLiter(s) IntraMuscular once  nafcillin  IVPB      nafcillin  IVPB 2 Gram(s) IV Intermittent every 4 hours  pantoprazole    Tablet 40 milliGRAM(s) Oral before breakfast  senna 2 Tablet(s) Oral at bedtime    MEDICATIONS  (PRN):  acetaminophen     Tablet .. 650 milliGRAM(s) Oral every 6 hours PRN Temp greater or equal to 38C (100.4F), Mild Pain (1 - 3)  benzonatate 100 milliGRAM(s) Oral every 8 hours PRN Cough  cyclobenzaprine 5 milliGRAM(s) Oral every 8 hours PRN Muscle Spasm  guaiFENesin Oral Liquid (Sugar-Free) 100 milliGRAM(s) Oral every 6 hours PRN Cough      CAPILLARY BLOOD GLUCOSE        I&O's Summary    31 Dec 2021 07:01  -  01 Jan 2022 07:00  --------------------------------------------------------  IN: 0 mL / OUT: 500 mL / NET: -500 mL        PHYSICAL EXAM:  Vital Signs Last 24 Hrs  T(C): 36.7 (02 Jan 2022 05:10), Max: 37.2 (01 Jan 2022 22:25)  T(F): 98.1 (02 Jan 2022 05:10), Max: 98.9 (01 Jan 2022 22:25)  HR: 84 (02 Jan 2022 05:10) (74 - 84)  BP: 107/76 (02 Jan 2022 05:10) (97/50 - 139/72)  BP(mean): 86 (01 Jan 2022 14:36) (86 - 86)  RR: 19 (02 Jan 2022 05:10) (18 - 19)  SpO2: 100% (02 Jan 2022 05:10) (100% - 100%)    CONSTITUTIONAL: NAD; well-developed; C-Collar   HEENT: PERRL, clear conjunctiva  RESPIRATORY: Normal respiratory effort; lungs are clear to auscultation bilaterally; No Crackles/Rhonchi/Wheezing  CARDIOVASCULAR: Regular rate and rhythm, normal S1 and S2, no murmur/rub/gallop; No lower extremity edema; Peripheral pulses are 2+ bilaterally  ABDOMEN: Nontender to palpation, normoactive bowel sounds, no rebound/guarding; No hepatosplenomegaly  MUSCLOSKELETAL: + R upper ext warm, tender to palpation; no clubbing or cyanosis of digits; no joint swelling or tenderness to palpation  EXTREMITY: Lower extremities Non-tender to palpation; non-erythematous B/L  Neuro: A&Ox3; no focal deficits   Psych: normal mood; Affect appropriate     LABS:                        10.2   7.22  )-----------( 622      ( 31 Dec 2021 07:52 )             31.4     12-31    137  |  102  |  12  ----------------------------<  100<H>  4.1   |  22  |  0.77    Ca    8.9      31 Dec 2021 07:52  Phos  2.8     12-31  Mg     2.00     12-31    TPro  6.7  /  Alb  3.0<L>  /  TBili  0.3  /  DBili  x   /  AST  13  /  ALT  10  /  AlkPhos  74  12-31                RADIOLOGY & ADDITIONAL TESTS:  Results Reviewed:   Imaging Personally Reviewed:  Electrocardiogram Personally Reviewed:    COORDINATION OF CARE:  Care Discussed with Consultants/Other Providers [Y/N]:  Prior or Outpatient Records Reviewed [Y/N]:

## 2022-01-02 NOTE — PROCEDURE NOTE - NSSITEPREP_SKIN_A_CORE
alcohol/chlorhexidine/povidone-iodine ( under 2 weeks of age or 1500 grams)/Adherence to aseptic technique: hand hygiene prior to donning barriers (gown, gloves), don cap and mask, sterile drape over patient

## 2022-01-02 NOTE — PROGRESS NOTE ADULT - PROBLEM SELECTOR PLAN 1
Now s/p C3-C6 laminectomy w/ PSF by ortho spine.   As per CM note, family to privately pay for ABX;  - Needs PICC placement on Monday AM   - Plan for discharge on 1/3 with PICC   - Continue Nafcillin 2g Q4hrs - patient will need this for 8 weeks per ID from ( 12/14/2021 - 2/21/2022)   - No fevers before placement of PICC   - As per ortho,  keep C-collar until 1/11/22

## 2022-01-02 NOTE — PROGRESS NOTE ADULT - PROBLEM SELECTOR PLAN 2
Pain on R upper ext since yesterday   Arm warm, tender. IV removed day prior   - Warm compress   - Cont to monitor in AM

## 2022-01-02 NOTE — PROGRESS NOTE ADULT - ASSESSMENT
66M w/o any known PMH presented s/p cervical laminectomy at [facility] for Osteo now on 8 weeks of Nafcillin , and COVID +. Patient to be discharged on 1/3 with PICC placement in AM.

## 2022-01-03 DIAGNOSIS — R04.2 HEMOPTYSIS: ICD-10-CM

## 2022-01-03 LAB
ALBUMIN SERPL ELPH-MCNC: 3 G/DL — LOW (ref 3.3–5)
ALP SERPL-CCNC: 85 U/L — SIGNIFICANT CHANGE UP (ref 40–120)
ALT FLD-CCNC: 12 U/L — SIGNIFICANT CHANGE UP (ref 4–41)
ANION GAP SERPL CALC-SCNC: 11 MMOL/L — SIGNIFICANT CHANGE UP (ref 7–14)
APTT BLD: 33.5 SEC — SIGNIFICANT CHANGE UP (ref 27–36.3)
AST SERPL-CCNC: 15 U/L — SIGNIFICANT CHANGE UP (ref 4–40)
BILIRUB SERPL-MCNC: 0.2 MG/DL — SIGNIFICANT CHANGE UP (ref 0.2–1.2)
BUN SERPL-MCNC: 13 MG/DL — SIGNIFICANT CHANGE UP (ref 7–23)
CALCIUM SERPL-MCNC: 8.8 MG/DL — SIGNIFICANT CHANGE UP (ref 8.4–10.5)
CHLORIDE SERPL-SCNC: 103 MMOL/L — SIGNIFICANT CHANGE UP (ref 98–107)
CO2 SERPL-SCNC: 25 MMOL/L — SIGNIFICANT CHANGE UP (ref 22–31)
CREAT SERPL-MCNC: 0.71 MG/DL — SIGNIFICANT CHANGE UP (ref 0.5–1.3)
GLUCOSE SERPL-MCNC: 89 MG/DL — SIGNIFICANT CHANGE UP (ref 70–99)
HCT VFR BLD CALC: 32.5 % — LOW (ref 39–50)
HGB BLD-MCNC: 10.7 G/DL — LOW (ref 13–17)
INR BLD: 1.34 RATIO — HIGH (ref 0.88–1.16)
MAGNESIUM SERPL-MCNC: 1.9 MG/DL — SIGNIFICANT CHANGE UP (ref 1.6–2.6)
MCHC RBC-ENTMCNC: 30 PG — SIGNIFICANT CHANGE UP (ref 27–34)
MCHC RBC-ENTMCNC: 32.9 GM/DL — SIGNIFICANT CHANGE UP (ref 32–36)
MCV RBC AUTO: 91 FL — SIGNIFICANT CHANGE UP (ref 80–100)
NRBC # BLD: 0 /100 WBCS — SIGNIFICANT CHANGE UP
NRBC # FLD: 0 K/UL — SIGNIFICANT CHANGE UP
PHOSPHATE SERPL-MCNC: 2.7 MG/DL — SIGNIFICANT CHANGE UP (ref 2.5–4.5)
PLATELET # BLD AUTO: 600 K/UL — HIGH (ref 150–400)
POTASSIUM SERPL-MCNC: 3.9 MMOL/L — SIGNIFICANT CHANGE UP (ref 3.5–5.3)
POTASSIUM SERPL-SCNC: 3.9 MMOL/L — SIGNIFICANT CHANGE UP (ref 3.5–5.3)
PROT SERPL-MCNC: 6.7 G/DL — SIGNIFICANT CHANGE UP (ref 6–8.3)
PROTHROM AB SERPL-ACNC: 15.2 SEC — HIGH (ref 10.6–13.6)
RBC # BLD: 3.57 M/UL — LOW (ref 4.2–5.8)
RBC # FLD: 18.6 % — HIGH (ref 10.3–14.5)
SODIUM SERPL-SCNC: 139 MMOL/L — SIGNIFICANT CHANGE UP (ref 135–145)
WBC # BLD: 8.2 K/UL — SIGNIFICANT CHANGE UP (ref 3.8–10.5)
WBC # FLD AUTO: 8.2 K/UL — SIGNIFICANT CHANGE UP (ref 3.8–10.5)

## 2022-01-03 PROCEDURE — 99232 SBSQ HOSP IP/OBS MODERATE 35: CPT

## 2022-01-03 PROCEDURE — 99232 SBSQ HOSP IP/OBS MODERATE 35: CPT | Mod: GC

## 2022-01-03 PROCEDURE — 71045 X-RAY EXAM CHEST 1 VIEW: CPT | Mod: 26

## 2022-01-03 RX ADMIN — CHLORHEXIDINE GLUCONATE 1 APPLICATION(S): 213 SOLUTION TOPICAL at 11:28

## 2022-01-03 RX ADMIN — NAFCILLIN 200 GRAM(S): 10 INJECTION, POWDER, FOR SOLUTION INTRAVENOUS at 05:14

## 2022-01-03 RX ADMIN — NAFCILLIN 200 GRAM(S): 10 INJECTION, POWDER, FOR SOLUTION INTRAVENOUS at 01:24

## 2022-01-03 RX ADMIN — NAFCILLIN 200 GRAM(S): 10 INJECTION, POWDER, FOR SOLUTION INTRAVENOUS at 18:09

## 2022-01-03 RX ADMIN — Medication 100 MILLIGRAM(S): at 21:36

## 2022-01-03 RX ADMIN — SENNA PLUS 2 TABLET(S): 8.6 TABLET ORAL at 21:35

## 2022-01-03 RX ADMIN — PANTOPRAZOLE SODIUM 40 MILLIGRAM(S): 20 TABLET, DELAYED RELEASE ORAL at 06:16

## 2022-01-03 RX ADMIN — NAFCILLIN 200 GRAM(S): 10 INJECTION, POWDER, FOR SOLUTION INTRAVENOUS at 21:36

## 2022-01-03 RX ADMIN — NAFCILLIN 200 GRAM(S): 10 INJECTION, POWDER, FOR SOLUTION INTRAVENOUS at 09:45

## 2022-01-03 RX ADMIN — NAFCILLIN 200 GRAM(S): 10 INJECTION, POWDER, FOR SOLUTION INTRAVENOUS at 13:07

## 2022-01-03 RX ADMIN — Medication 100 MILLIGRAM(S): at 05:27

## 2022-01-03 NOTE — PROGRESS NOTE ADULT - ASSESSMENT
66 M with T2DM and HTN with ED visit 2 days prior to presentation, found to have ESBL UTI and MSSA bacteremia and started on cefpodoxime, presented for worsening back pain a/w weakness, found to have b/l psoas abscesses and cervical and lumbar osteomyelitis  LE weakness, MSSA bacteremia, cervical and lumbar osteomyelitis, psoas abscess, s/p C3-C6 laminectomy 12/14  COVID+ - s/p monoclonal ab infusion 12/21  TTE generally reassuring  Remains stable from COVID perspective  Overall,  1) MSSA Bacteremia  - Concern for OM/psoas abscess; MSSA on BCXs, repeats clear  - Nafcillin 2g q 4 through 2/21/21  - TTE reassuring, hold on GIGI as long as stable clinical status (will need prolonged course abx regardless)  - Okay for PICC as long as no fevers, no other new signs sepsis, BCXs remain negative  - If for facility placement, facility MD to monitor while on abx, recommend CBC, CMP weekly  - After DC follow up in ID clinic in 5 weeks, [phone]  2) OM/Discitis  - S/p laminectomy  - Wound care per surgery team  3) COVID  - RA, asymptomatic but nosocomial?  - S/p Monoclonal  - Hold on RemD/Dexa unless progressive O2 requirements  - Check CXR    Signing off. Please call with further questions or change in status.    Marc Cameron MD  Pager [phone]  From 5pm-9am, and on weekends call [phone]

## 2022-01-03 NOTE — PROGRESS NOTE ADULT - PROBLEM SELECTOR PLAN 6
Diet: Consistent Carb  DVT: SCDs - chemical prophylaxis contraindicated   Dispo: Home pending PICC placement

## 2022-01-03 NOTE — PROGRESS NOTE ADULT - PROBLEM SELECTOR PLAN 1
Now s/p C3-C6 laminectomy w/ PSF by ortho spine.   As per CM note, family to privately pay for ABX;  - Needs PICC placement on Monday AM   - Plan for discharge on 1/3 with PICC   - Continue Nafcillin 2g Q4hrs - patient will need this for 8 weeks per ID from ( 12/14/2021 - 2/21/2022)   - No fevers before placement of PICC   - As per ortho,  keep C-collar until 1/11/22 Now s/p C3-C6 laminectomy w/ PSF by ortho spine.   As per  note, family to privately pay for ABX;  - Needs PICC placement prior to d/c - per , no infusion staffing til Wednesday, will defer d/c plans to Wednesday.  - Continue Nafcillin 2g Q4hrs - patient will need this for 8 weeks per ID from (12/14/2021 - 2/21/2022)   - No fevers before placement of PICC   - As per ortho,  keep C-collar until 1/11/22

## 2022-01-03 NOTE — PROGRESS NOTE ADULT - PROBLEM SELECTOR PLAN 3
COVID +, Patient on RA with dry cough. Not experiencing SOB, or CP.  S/P Casirivimab/imdevimab  - Monitor symptoms   - Cough medicine PRN  - Tylenol prn Pain on R upper ext since 1/1  Arm warm, tender. IV removed day prior   - Warm compress   - No complaints this AM

## 2022-01-03 NOTE — PROGRESS NOTE ADULT - PROBLEM SELECTOR PLAN 5
Diet: Consistent Carb  DVT: SCDs - chemical prophylaxis contraindicated   Dispo: Home CT abd pelvis w/ L psoas abscess.  MRI showed psoas abscess bilaterally.  Bacteremia with Staph aureus    IR consulted for possible psoas abscess drainage - evaluated and found abscess to be too small to drain.   CT A/P 12/28 shows decreased size of psoas abscess   - Abx as above

## 2022-01-03 NOTE — PROGRESS NOTE ADULT - ATTENDING COMMENTS
66M with no known PMH here w/ cervical spine OM w/ phlegmon, discitis s/p lami and PSF.  On nafcillin for MSSA. Tolerating abx well. Dispo challenging due to uninsured status.  Pt c/o cough with some blood streaked sputum this morning.    -Continue nafcillin , plan for 8 week course per ID  -pending PICC line placement pre-discharge  -per CM/SW , home infusion staffing won't be available until Wed, dc plan home Wed  -antitussive meds, check CXR and CBC, pt is not on any AC  Case d/w house staff.

## 2022-01-03 NOTE — PROGRESS NOTE ADULT - SUBJECTIVE AND OBJECTIVE BOX
CC: F/U for Bacteremia    Saw/spoke to patient. No fevers, no chills. No new complaints.    Allergies  No Known Allergies    ANTIMICROBIALS:  nafcillin  IVPB    nafcillin  IVPB 2 every 4 hours    PE:    Vital Signs Last 24 Hrs  T(C): 37 (03 Jan 2022 13:00), Max: 37.3 (02 Jan 2022 14:10)  T(F): 98.6 (03 Jan 2022 13:00), Max: 99.1 (02 Jan 2022 14:10)  HR: 84 (03 Jan 2022 13:00) (76 - 84)  BP: 124/63 (03 Jan 2022 13:00) (110/64 - 126/79)  RR: 16 (03 Jan 2022 13:00) (16 - 18)  SpO2: 100% (03 Jan 2022 13:00) (100% - 100%)    Gen: AOx3, NAD, non-toxic, pleasant  CV: S1+S2 normal, nontachycardic  Resp: Clear bilat, no resp distress, no crackles/wheezes  Abd: Soft, nontender, +BS  Ext: No LE edema, no wounds    LABS:                        10.7   8.20  )-----------( 600      ( 03 Jan 2022 11:00 )             32.5     01-03    139  |  103  |  13  ----------------------------<  89  3.9   |  25  |  0.71    Ca    8.8      03 Jan 2022 11:00  Phos  2.7     01-03  Mg     1.90     01-03    TPro  6.7  /  Alb  3.0<L>  /  TBili  0.2  /  DBili  x   /  AST  15  /  ALT  12  /  AlkPhos  85  01-03    MICROBIOLOGY:    .Blood Blood-Peripheral  12-20-21   No Growth Final     .Blood Blood-Peripheral  12-20-21   No Growth Final     Clean Catch Clean Catch (Midstream)  12-10-21   >100,000 CFU/ml Escherichia coli ESBL  --  Escherichia coli ESBL    .Blood Blood-Peripheral  12-10-21   Growth in aerobic bottle: Staphylococcus aureus  See previous culture [id number]  Growth in anaerobic bottle: Staphylococcus epidermidis  Coag Negative Staphylococcus  Single set isolate, possible contaminant. Contact  Microbiology if susceptibility testing clinically  indicated.  --    Growth in aerobic and anaerobic bottles: Gram Positive Cocci in Clusters      .Blood Blood-Venous  12-10-21   Growth in aerobic and anaerobic bottles: Staphylococcus aureus  ***Blood Panel PCR results on this specimen are available  approximately 3 hours after the Gram stain result.***  Gram stain, PCR, and/or culture results may not always  correspond dueto difference in methodologies.  ************************************************************  This PCR assay was performed by multiplex PCR. This  Assay tests for 66 bacterial and resistance gene targets.  Please refer to the [facility] test directory  at [email or web address] for details.  --  Blood Culture PCR  Staphylococcus aureus    (otherwise reviewed)    RADIOLOGY:    12/28 CT:    IMPRESSION:  Left psoas abscess decreased in size.

## 2022-01-03 NOTE — PROGRESS NOTE ADULT - PROBLEM SELECTOR PLAN 2
Pain on R upper ext since yesterday   Arm warm, tender. IV removed day prior   - Warm compress   - Cont to monitor in AM - First episode with cough this AM  - Blood-tinged sputum, unclear if related to Robitussin use, however was given last night and has not had red sputum previously while on med  - CBC, coags, CXR

## 2022-01-03 NOTE — PROGRESS NOTE ADULT - ASSESSMENT
66M w/o any known PMH presented s/p cervical laminectomy at [facility] for Osteo now on 8 weeks of Nafcillin , and COVID +. Patient to be discharged on 1/3 with PICC placement in AM.  66M w/o any known PMH presented s/p cervical laminectomy at [facility] for Osteo now on 8 weeks of Nafcillin, and COVID +. Patient to be discharged pending PICC placement.

## 2022-01-03 NOTE — PROGRESS NOTE ADULT - PROBLEM SELECTOR PLAN 4
CT abd pelvis w/ L psoas abscess.  MRI showed psoas abscess bilaterally.  Bacteremia with Staph aureus    IR consulted for possible psoas abscess drainage - evaluated and found abscess to be too small to drain.   CT A/P 12/28 shows decreased size of psoas abscess   - Abx as above COVID +, Patient on RA with dry cough. Not experiencing SOB, or CP.  S/P Casirivimab/imdevimab  - Monitor symptoms   - Cough medicine PRN  - Tylenol prn

## 2022-01-03 NOTE — PROGRESS NOTE ADULT - SUBJECTIVE AND OBJECTIVE BOX
Internal Medicine   Marilyn Goldman | PGY-1    OVERNIGHT EVENTS: Patient reporting hemoptysis this AM. Per RN, patient given Robitussin last night but has been giving him med for some time and this is first instance.      SUBJECTIVE: Patient was seen and examined at bedside this morning. Complains of blood in sputum after coughing. Reports this is his first instance since being hospitalized. Continues to have some cough with associated pain around waist, denies SOB. Denies other myalgias. Has no other complaints today. Denies F/C, nausea/vomiting/diarrhea, abdominal pain or chest pain. Last BM this AM, no blood in stool. Patient responding appropriately to questions and able to make needs known.       MEDICATIONS  (STANDING):  chlorhexidine 2% Cloths 1 Application(s) Topical daily  influenza  Vaccine (HIGH DOSE) 0.7 milliLiter(s) IntraMuscular once  nafcillin  IVPB      nafcillin  IVPB 2 Gram(s) IV Intermittent every 4 hours  pantoprazole    Tablet 40 milliGRAM(s) Oral before breakfast  senna 2 Tablet(s) Oral at bedtime    MEDICATIONS  (PRN):  acetaminophen     Tablet .. 650 milliGRAM(s) Oral every 6 hours PRN Temp greater or equal to 38C (100.4F), Mild Pain (1 - 3)  benzonatate 100 milliGRAM(s) Oral every 8 hours PRN Cough  cyclobenzaprine 5 milliGRAM(s) Oral every 8 hours PRN Muscle Spasm  guaiFENesin Oral Liquid (Sugar-Free) 100 milliGRAM(s) Oral every 6 hours PRN Cough        T(F): 98.5 (01-03-22 @ 05:18), Max: 99.1 (01-02-22 @ 14:10)  HR: 78 (01-03-22 @ 05:18) (76 - 82)  BP: 118/67 (01-03-22 @ 05:18) (110/64 - 118/67)  BP(mean): --  RR: 18 (01-03-22 @ 05:18) (17 - 18)  SpO2: 100% (01-03-22 @ 05:18) (100% - 100%)    PHYSICAL EXAM:     GENERAL: NAD, walking around room and sitting up in bed comfortably.  HEAD:  Atraumatic, normocephalic.  CHEST/LUNG: CTAB. No rales, rhonchi, wheezing, or rubs. Unlabored respirations.  HEART: Tachycardic, no M/R/G, normal S1/S2.  ABDOMEN: Soft, nontender, nondistended. Normoactive bowel sounds.  EXTREMITIES:  2+ peripheral pulses b/l. No clubbing, cyanosis, or edema.  NEURO:  No focal deficits.   SKIN: Dry skin on all extremities.  PSYCH: Normal mood, affect.    TELEMETRY:    LABS:                  Creatinine Trend: 0.77<--, 0.73<--, 0.72<--, 0.63<--, 0.72<--, 0.75<--  I&O's Summary    02 Jan 2022 07:01  -  03 Jan 2022 07:00  --------------------------------------------------------  IN: 300 mL / OUT: 1000 mL / NET: -700 mL      BNP    RADIOLOGY & ADDITIONAL STUDIES:

## 2022-01-04 LAB — SARS-COV-2 RNA SPEC QL NAA+PROBE: DETECTED

## 2022-01-04 PROCEDURE — 99232 SBSQ HOSP IP/OBS MODERATE 35: CPT | Mod: GC

## 2022-01-04 RX ADMIN — Medication 100 MILLIGRAM(S): at 21:55

## 2022-01-04 RX ADMIN — PANTOPRAZOLE SODIUM 40 MILLIGRAM(S): 20 TABLET, DELAYED RELEASE ORAL at 08:01

## 2022-01-04 RX ADMIN — NAFCILLIN 200 GRAM(S): 10 INJECTION, POWDER, FOR SOLUTION INTRAVENOUS at 01:46

## 2022-01-04 RX ADMIN — NAFCILLIN 200 GRAM(S): 10 INJECTION, POWDER, FOR SOLUTION INTRAVENOUS at 06:03

## 2022-01-04 RX ADMIN — NAFCILLIN 200 GRAM(S): 10 INJECTION, POWDER, FOR SOLUTION INTRAVENOUS at 18:26

## 2022-01-04 RX ADMIN — NAFCILLIN 200 GRAM(S): 10 INJECTION, POWDER, FOR SOLUTION INTRAVENOUS at 21:55

## 2022-01-04 RX ADMIN — Medication 100 MILLIGRAM(S): at 14:31

## 2022-01-04 RX ADMIN — Medication 100 MILLIGRAM(S): at 06:03

## 2022-01-04 RX ADMIN — Medication 100 MILLIGRAM(S): at 14:30

## 2022-01-04 RX ADMIN — NAFCILLIN 200 GRAM(S): 10 INJECTION, POWDER, FOR SOLUTION INTRAVENOUS at 10:51

## 2022-01-04 RX ADMIN — CHLORHEXIDINE GLUCONATE 1 APPLICATION(S): 213 SOLUTION TOPICAL at 11:27

## 2022-01-04 RX ADMIN — NAFCILLIN 200 GRAM(S): 10 INJECTION, POWDER, FOR SOLUTION INTRAVENOUS at 14:29

## 2022-01-04 NOTE — CHART NOTE - NSCHARTNOTEFT_GEN_A_CORE
Patient assessed by RDN on 12/22, now for nutrition follow up. Spoke with pt and obtained subjective information from extensive chart review.     Current Diet : Diet, Consistent Carbohydrate w/Evening Snack:   Supplement Feeding Modality:  Oral  Glucerna Shake Cans or Servings Per Day:  1       Frequency:  Three Times a day (12-22-21 @ 17:46)    PO intake: 50-75%   Current Weight: Unavailable    Nutrition Interval Events: Pt eating fairly at this time; he consumes approx 75% of his supplements. No food preferences offered. No GI distress; last BM 1/3. Request obtaining new weight to assess trend and determine adequacy of PO intake. No edema nor pressure injuries noted. Continue with oral supplementation for additional calories and protein.    __________________ Pertinent Medications__________________   MEDICATIONS  (STANDING):  chlorhexidine 2% Cloths 1 Application(s) Topical daily  influenza  Vaccine (HIGH DOSE) 0.7 milliLiter(s) IntraMuscular once  nafcillin  IVPB 2 Gram(s) IV Intermittent every 4 hours  nafcillin  IVPB      pantoprazole    Tablet 40 milliGRAM(s) Oral before breakfast  senna 2 Tablet(s) Oral at bedtime    MEDICATIONS  (PRN):  acetaminophen     Tablet .. 650 milliGRAM(s) Oral every 6 hours PRN Temp greater or equal to 38C (100.4F), Mild Pain (1 - 3)  benzonatate 100 milliGRAM(s) Oral every 8 hours PRN Cough  cyclobenzaprine 5 milliGRAM(s) Oral every 8 hours PRN Muscle Spasm  guaiFENesin Oral Liquid (Sugar-Free) 100 milliGRAM(s) Oral every 6 hours PRN Cough      __________________ Pertinent Labs__________________   01-03 Na139 mmol/L Glu 89 mg/dL K+ 3.9 mmol/L Cr  0.71 mg/dL BUN 13 mg/dL 01-03 Phos 2.7 mg/dL 01-03 Alb 3.0 g/dL<L>      Estimated Needs:   [x] no change since previous assessment        Previous Nutrition Diagnosis:     Inadequate Oral Intake   Nutrition Diagnosis is [x] ongoing          Goal(s):  1. Patient to meet > 75% estimated energy needs    Recommendations:   1. Continue Nutrition Plan of Care as ordered.    Monitoring and Evaluation:   1. Monitor weights, labs, BMs, skin integrity, PO intake.  2. RD services to remain available. Request obtaining new weight to assess trend and determine adequacy of PO intake. Continue with oral supplementation for additional calories and protein.

## 2022-01-04 NOTE — PROGRESS NOTE ADULT - PROBLEM SELECTOR PLAN 3
Pain on R upper ext since 1/1  Arm warm, tender. IV removed day prior   - Warm compress   - No complaints this AM

## 2022-01-04 NOTE — PROGRESS NOTE ADULT - PROBLEM SELECTOR PLAN 2
- Bright red sputum x1 on 1/1  - Blood-tinged sputum, unclear if related to Robitussin use, however was given last night and has not had red sputum previously while on med  - CBC, coags, CXR unremarkable  - No hemoptysis today 1/4

## 2022-01-04 NOTE — PROGRESS NOTE ADULT - SUBJECTIVE AND OBJECTIVE BOX
Internal Medicine   Marilyn Goldman | PGY-1    OVERNIGHT EVENTS: BOB      SUBJECTIVE: Patient was seen and examined at bedside this morning. Continues to report cough, but denies hemoptysis today. States the pain he felt in his waist with coughing has improved. Also reports that he has had numbness on the soles of his feet, and that he has been doing exercises which improves the sensation. Denies any F/C, nausea/vomiting/diarrhea, headache, dizziness, shortness of breath, abdominal pain or chest pain. Patient responding appropriately to questions and able to make needs known.       MEDICATIONS  (STANDING):  chlorhexidine 2% Cloths 1 Application(s) Topical daily  influenza  Vaccine (HIGH DOSE) 0.7 milliLiter(s) IntraMuscular once  nafcillin  IVPB 2 Gram(s) IV Intermittent every 4 hours  nafcillin  IVPB      pantoprazole    Tablet 40 milliGRAM(s) Oral before breakfast  senna 2 Tablet(s) Oral at bedtime    MEDICATIONS  (PRN):  acetaminophen     Tablet .. 650 milliGRAM(s) Oral every 6 hours PRN Temp greater or equal to 38C (100.4F), Mild Pain (1 - 3)  benzonatate 100 milliGRAM(s) Oral every 8 hours PRN Cough  cyclobenzaprine 5 milliGRAM(s) Oral every 8 hours PRN Muscle Spasm  guaiFENesin Oral Liquid (Sugar-Free) 100 milliGRAM(s) Oral every 6 hours PRN Cough        T(F): 97.2 (01-04-22 @ 08:56), Max: 98.8 (01-03-22 @ 18:15)  HR: 85 (01-04-22 @ 08:56) (72 - 85)  BP: 112/73 (01-04-22 @ 02:00) (112/73 - 126/79)  BP(mean): --  RR: 18 (01-04-22 @ 08:56) (16 - 18)  SpO2: 100% (01-04-22 @ 08:56) (100% - 100%)    PHYSICAL EXAM:     GENERAL: NAD, sitting up in bed comfortably.  HEAD:  Atraumatic, normocephalic.  CHEST/LUNG: Rales at b/l bases. Unlabored respirations.  HEART: Tachycardic, no M/R/G, normal S1/S2.  ABDOMEN: Soft, nontender, nondistended. Normoactive bowel sounds.  EXTREMITIES:  2+ radial, 1+ DP pulses b/l, brisk capillary refill. No clubbing, cyanosis, or edema.  NEURO:  AAOx3, no focal deficits.   SKIN: Dry.  PSYCH: Normal mood, affect.      LABS:                        10.7   8.20  )-----------( 600      ( 03 Jan 2022 11:00 )             32.5     01-03    139  |  103  |  13  ----------------------------<  89  3.9   |  25  |  0.71    Ca    8.8      03 Jan 2022 11:00  Phos  2.7     01-03  Mg     1.90     01-03    TPro  6.7  /  Alb  3.0<L>  /  TBili  0.2  /  DBili  x   /  AST  15  /  ALT  12  /  AlkPhos  85  01-03        PT/INR - ( 03 Jan 2022 11:00 )   PT: 15.2 sec;   INR: 1.34 ratio         PTT - ( 03 Jan 2022 11:00 )  PTT:33.5 sec    Creatinine Trend: 0.71<--, 0.77<--, 0.73<--, 0.72<--, 0.63<--, 0.72<--  I&O's Summary    03 Jan 2022 07:01  -  04 Jan 2022 07:00  --------------------------------------------------------  IN: 820 mL / OUT: 900 mL / NET: -80 mL    04 Jan 2022 07:01  -  04 Jan 2022 09:12  --------------------------------------------------------  IN: 200 mL / OUT: 0 mL / NET: 200 mL      BNP    RADIOLOGY & ADDITIONAL STUDIES:

## 2022-01-04 NOTE — PROGRESS NOTE ADULT - ATTENDING COMMENTS
Patient seen and examined. Case discussed with the medical team on rounds. I agree with the findings and the plan above.    66 gentlemen with no known PMH here w/ cervical spine OM w/ phlegmon, discitis s/p lami and PSF.  On nafcillin for MSSA. Tolerating abx well. Dispo challenging due to uninsured status.  Pt c/o cough with some blood streaked sputum 1/3, now resolved.    -Continue nafcillin , plan for 8 week course per ID  -pending PICC line placement pre-discharge  -per CM/SW  -Challenging dispo plan as patient is undocumented

## 2022-01-04 NOTE — PROGRESS NOTE ADULT - ASSESSMENT
66M w/o any known PMH presented s/p cervical laminectomy at [facility] for [facility] now on 8 weeks of Nafcillin, and COVID +. Patient to be discharged pending PICC placement.

## 2022-01-04 NOTE — PROGRESS NOTE ADULT - PROBLEM SELECTOR PLAN 4
COVID+, patient on RA with dry cough. Not experiencing SOB, or CP.  - s/p Casirivimab/imdevimab  - Monitor symptoms   - Cough medicine PRN  - Tylenol prn

## 2022-01-04 NOTE — CHART NOTE - NSCHARTNOTEFT_GEN_A_CORE
Ortho was called by Medicine ACP yesterday 1/3/22 regarding restarting chemical prophylaxis. Pt was POD #20 from a C3-6 laminectomy/posterior spinal fusion, spoke with spine surgeon Dr. Lara who states no contraindication to resuming. Ortho also re-evaluated incision, healing well, with no drainage, replaced with clean, dry dressing and placed back into cervical collar. No further orthopedic recommendations at this time. Pt can follow up with Dr. Lara upon discharge, call office for appointment [phone].

## 2022-01-04 NOTE — PROGRESS NOTE ADULT - PROBLEM SELECTOR PLAN 1
Now s/p C3-C6 laminectomy w/ PSF by ortho spine.   As per CM note, family to privately pay for ABX;  - Plan for PICC placement 1/4   - Continue Nafcillin 2g Q4hrs - patient will need this for 8 weeks per ID from (12/14/2021 - 2/21/2022)   - No fevers before placement of PICC   - As per ortho,  keep C-collar until 1/11/22

## 2022-01-05 PROCEDURE — 99232 SBSQ HOSP IP/OBS MODERATE 35: CPT | Mod: GC

## 2022-01-05 RX ORDER — ENOXAPARIN SODIUM 100 MG/ML
40 INJECTION SUBCUTANEOUS DAILY
Refills: 0 | Status: DISCONTINUED | OUTPATIENT
Start: 2022-01-05 | End: 2022-01-13

## 2022-01-05 RX ADMIN — NAFCILLIN 200 GRAM(S): 10 INJECTION, POWDER, FOR SOLUTION INTRAVENOUS at 10:19

## 2022-01-05 RX ADMIN — NAFCILLIN 200 GRAM(S): 10 INJECTION, POWDER, FOR SOLUTION INTRAVENOUS at 06:55

## 2022-01-05 RX ADMIN — NAFCILLIN 200 GRAM(S): 10 INJECTION, POWDER, FOR SOLUTION INTRAVENOUS at 18:47

## 2022-01-05 RX ADMIN — NAFCILLIN 200 GRAM(S): 10 INJECTION, POWDER, FOR SOLUTION INTRAVENOUS at 02:08

## 2022-01-05 RX ADMIN — NAFCILLIN 200 GRAM(S): 10 INJECTION, POWDER, FOR SOLUTION INTRAVENOUS at 21:49

## 2022-01-05 RX ADMIN — Medication 100 MILLIGRAM(S): at 21:52

## 2022-01-05 RX ADMIN — Medication 100 MILLIGRAM(S): at 14:35

## 2022-01-05 RX ADMIN — NAFCILLIN 200 GRAM(S): 10 INJECTION, POWDER, FOR SOLUTION INTRAVENOUS at 14:35

## 2022-01-05 RX ADMIN — CHLORHEXIDINE GLUCONATE 1 APPLICATION(S): 213 SOLUTION TOPICAL at 12:04

## 2022-01-05 RX ADMIN — ENOXAPARIN SODIUM 40 MILLIGRAM(S): 100 INJECTION SUBCUTANEOUS at 14:35

## 2022-01-05 RX ADMIN — PANTOPRAZOLE SODIUM 40 MILLIGRAM(S): 20 TABLET, DELAYED RELEASE ORAL at 07:16

## 2022-01-05 NOTE — PROGRESS NOTE ADULT - PROBLEM SELECTOR PLAN 4
COVID+, patient on RA with dry cough. Not experiencing SOB, or CP.  - s/p Casirivimab/imdevimab  - Monitor symptoms   - Cough medicine PRN  - Tylenol prn COVID+, patient on RA with dry cough. Not experiencing SOB, or CP.  - s/p Casirivimab/imdevimab  - Monitor symptoms   - Tessalon perles PRN  - Tylenol prn

## 2022-01-05 NOTE — PROGRESS NOTE ADULT - PROBLEM SELECTOR PLAN 6
Diet: Consistent Carb  DVT: SCDs - chemical prophylaxis contraindicated   Dispo: Home pending PICC placement Diet: Consistent Carb  DVT: Lovenox   Dispo: Home pending PICC placement

## 2022-01-05 NOTE — PROGRESS NOTE ADULT - ATTENDING COMMENTS
Patient seen and examined. Case discussed with the medical team on rounds. I agree with the findings and the plan above.    66 year old gentlemen with no known PMH here w/ cervical spine OM w/ phlegmon, discitis s/p lami and PSF.  On nafcillin for MSSA. Tolerating abx well. Dispo challenging due to uninsured status.  Pt continues to c/o cough with some blood streaked sputum.     -Continue nafcillin , plan for 8 week course per ID  -Pending PICC line placement pre-discharge  -Robitussin discontinued for now b/o occasional blood streaked sputum.   -per CM/SW  -Challenging dispo plan as patient is undocumented. Letter drafted to allow patient's son to come to the [place] on a medical visa. Will continue to follow latest developments.

## 2022-01-05 NOTE — PROGRESS NOTE ADULT - PROBLEM SELECTOR PLAN 1
Now s/p C3-C6 laminectomy w/ PSF by ortho spine.   As per CM note, family to privately pay for ABX;  - Plan for PICC placement 1/4   - Continue Nafcillin 2g Q4hrs - patient will need this for 8 weeks per ID from (12/14/2021 - 2/21/2022)   - No fevers before placement of PICC   - As per ortho,  keep C-collar until 1/11/22 Now s/p C3-C6 laminectomy w/ PSF by ortho spine.   As per CM note, family to privately pay for Abx  - Plan for PICC placement deferred until dispo issues resolve  - Continue Nafcillin 2g Q4hrs - patient will need this for 8 weeks per ID from (12/14/2021 - 2/21/2022)   - No fevers before placement of PICC   - As per ortho,  keep C-collar until 1/11/22

## 2022-01-05 NOTE — PROGRESS NOTE ADULT - SUBJECTIVE AND OBJECTIVE BOX
Internal Medicine   Marilyn Goldman | PGY-1    OVERNIGHT EVENTS:      SUBJECTIVE:       MEDICATIONS  (STANDING):  chlorhexidine 2% Cloths 1 Application(s) Topical daily  influenza  Vaccine (HIGH DOSE) 0.7 milliLiter(s) IntraMuscular once  nafcillin  IVPB 2 Gram(s) IV Intermittent every 4 hours  nafcillin  IVPB      pantoprazole    Tablet 40 milliGRAM(s) Oral before breakfast  senna 2 Tablet(s) Oral at bedtime    MEDICATIONS  (PRN):  acetaminophen     Tablet .. 650 milliGRAM(s) Oral every 6 hours PRN Temp greater or equal to 38C (100.4F), Mild Pain (1 - 3)  benzonatate 100 milliGRAM(s) Oral every 8 hours PRN Cough  cyclobenzaprine 5 milliGRAM(s) Oral every 8 hours PRN Muscle Spasm  guaiFENesin Oral Liquid (Sugar-Free) 100 milliGRAM(s) Oral every 6 hours PRN Cough        T(F): 98.3 (01-04-22 @ 22:12), Max: 98.6 (01-04-22 @ 17:34)  HR: 85 (01-04-22 @ 22:12) (82 - 85)  BP: 127/72 (01-04-22 @ 22:12) (127/72 - 137/80)  BP(mean): --  RR: 18 (01-04-22 @ 22:12) (18 - 18)  SpO2: 100% (01-04-22 @ 22:12) (100% - 100%)    PHYSICAL EXAM:     GENERAL: NAD, lying in bed comfortably.  HEAD:  Atraumatic, normocephalic.  EYES: EOMI, PERRLA, conjunctiva and sclera clear, no nystagmus noted.  ENT: Moist mucous membranes,   NECK: Supple. No JVD. Trachea midline.  CHEST/LUNG: CTAB. No rales, rhonchi, wheezing, or rubs. Unlabored respirations.  HEART: RRR, no M/R/G, normal S1/S2.  ABDOMEN: Soft, nontender, nondistended, no organomegaly. Normoactive bowel sounds.  EXTREMITIES:  2+ peripheral pulses b/l, brisk capillary refill. No clubbing, cyanosis, or edema.  MSK: No gross deformities noted.   NEURO:  AAOx3, no focal deficits.   SKIN: No rashes or lesions.  PSYCH: Normal mood, affect.    TELEMETRY:    LABS:                        10.7   8.20  )-----------( 600      ( 03 Jan 2022 11:00 )             32.5     01-03    139  |  103  |  13  ----------------------------<  89  3.9   |  25  |  0.71    Ca    8.8      03 Jan 2022 11:00  Phos  2.7     01-03  Mg     1.90     01-03    TPro  6.7  /  Alb  3.0<L>  /  TBili  0.2  /  DBili  x   /  AST  15  /  ALT  12  /  AlkPhos  85  01-03        PT/INR - ( 03 Jan 2022 11:00 )   PT: 15.2 sec;   INR: 1.34 ratio         PTT - ( 03 Jan 2022 11:00 )  PTT:33.5 sec    Creatinine Trend: 0.71<--, 0.77<--, 0.73<--, 0.72<--, 0.63<--, 0.72<--  I&O's Summary    04 Jan 2022 07:01  -  05 Jan 2022 07:00  --------------------------------------------------------  IN: 1000 mL / OUT: 600 mL / NET: 400 mL      BNP    RADIOLOGY & ADDITIONAL STUDIES:             Internal Medicine   Marilyn Goldman | PGY-1    OVERNIGHT EVENTS: Some bloody streaks in sputum.      SUBJECTIVE: Patient was seen and examined at bedside this morning. Reports he did not sleep well last night due to a loud roommate. Continues to report cough, states the cough medication is not working. States he had some streaks of blood in his sputum again overnight, but that someone accidentally threw it away earlier this AM. Also continues to report sporadic numbness of his feet. Denies any F/C, nausea/vomiting/diarrhea, headache, dizziness,  shortness of breath, abdominal pain or chest pain. Patient responding appropriately to questions and able to make needs known.        MEDICATIONS  (STANDING):  chlorhexidine 2% Cloths 1 Application(s) Topical daily  influenza  Vaccine (HIGH DOSE) 0.7 milliLiter(s) IntraMuscular once  nafcillin  IVPB 2 Gram(s) IV Intermittent every 4 hours  nafcillin  IVPB      pantoprazole    Tablet 40 milliGRAM(s) Oral before breakfast  senna 2 Tablet(s) Oral at bedtime    MEDICATIONS  (PRN):  acetaminophen     Tablet .. 650 milliGRAM(s) Oral every 6 hours PRN Temp greater or equal to 38C (100.4F), Mild Pain (1 - 3)  benzonatate 100 milliGRAM(s) Oral every 8 hours PRN Cough  cyclobenzaprine 5 milliGRAM(s) Oral every 8 hours PRN Muscle Spasm  guaiFENesin Oral Liquid (Sugar-Free) 100 milliGRAM(s) Oral every 6 hours PRN Cough        T(F): 98.3 (01-04-22 @ 22:12), Max: 98.6 (01-04-22 @ 17:34)  HR: 85 (01-04-22 @ 22:12) (82 - 85)  BP: 127/72 (01-04-22 @ 22:12) (127/72 - 137/80)  BP(mean): --  RR: 18 (01-04-22 @ 22:12) (18 - 18)  SpO2: 100% (01-04-22 @ 22:12) (100% - 100%)    PHYSICAL EXAM:     GENERAL: NAD, lying in bed comfortably.  HEAD:  Atraumatic, normocephalic.  CHEST/LUNG: CTAB. No rales, rhonchi, wheezing, or rubs. Unlabored respirations.  HEART: RRR, no M/R/G, normal S1/S2.  ABDOMEN: Soft, nontender, nondistended. Normoactive bowel sounds.  EXTREMITIES:  2+ peripheral pulses b/l, brisk capillary refill. No clubbing, cyanosis, or edema.  NEURO:  AAOx3.  SKIN: Dry.  PSYCH: Normal mood, affect.    TELEMETRY:    LABS:                        10.7   8.20  )-----------( 600      ( 03 Jan 2022 11:00 )             32.5     01-03    139  |  103  |  13  ----------------------------<  89  3.9   |  25  |  0.71    Ca    8.8      03 Jan 2022 11:00  Phos  2.7     01-03  Mg     1.90     01-03    TPro  6.7  /  Alb  3.0<L>  /  TBili  0.2  /  DBili  x   /  AST  15  /  ALT  12  /  AlkPhos  85  01-03        PT/INR - ( 03 Jan 2022 11:00 )   PT: 15.2 sec;   INR: 1.34 ratio         PTT - ( 03 Jan 2022 11:00 )  PTT:33.5 sec    Creatinine Trend: 0.71<--, 0.77<--, 0.73<--, 0.72<--, 0.63<--, 0.72<--  I&O's Summary    04 Jan 2022 07:01  -  05 Jan 2022 07:00  --------------------------------------------------------  IN: 1000 mL / OUT: 600 mL / NET: 400 mL      BNP    RADIOLOGY & ADDITIONAL STUDIES:

## 2022-01-06 LAB
HCT VFR BLD CALC: 40.9 % — SIGNIFICANT CHANGE UP (ref 39–50)
HGB BLD-MCNC: 13.4 G/DL — SIGNIFICANT CHANGE UP (ref 13–17)
MCHC RBC-ENTMCNC: 30.6 PG — SIGNIFICANT CHANGE UP (ref 27–34)
MCHC RBC-ENTMCNC: 32.8 GM/DL — SIGNIFICANT CHANGE UP (ref 32–36)
MCV RBC AUTO: 93.4 FL — SIGNIFICANT CHANGE UP (ref 80–100)
NRBC # BLD: 0 /100 WBCS — SIGNIFICANT CHANGE UP
NRBC # FLD: 0 K/UL — SIGNIFICANT CHANGE UP
PLATELET # BLD AUTO: 497 K/UL — HIGH (ref 150–400)
RBC # BLD: 4.38 M/UL — SIGNIFICANT CHANGE UP (ref 4.2–5.8)
RBC # FLD: 19.3 % — HIGH (ref 10.3–14.5)
WBC # BLD: 7.53 K/UL — SIGNIFICANT CHANGE UP (ref 3.8–10.5)
WBC # FLD AUTO: 7.53 K/UL — SIGNIFICANT CHANGE UP (ref 3.8–10.5)

## 2022-01-06 PROCEDURE — 82803 BLOOD GASES ANY COMBINATION: CPT

## 2022-01-06 PROCEDURE — 85025 COMPLETE CBC W/AUTO DIFF WBC: CPT

## 2022-01-06 PROCEDURE — 82947 ASSAY GLUCOSE BLOOD QUANT: CPT

## 2022-01-06 PROCEDURE — 97161 PT EVAL LOW COMPLEX 20 MIN: CPT

## 2022-01-06 PROCEDURE — C9399: CPT

## 2022-01-06 PROCEDURE — C1889: CPT

## 2022-01-06 PROCEDURE — 82435 ASSAY OF BLOOD CHLORIDE: CPT

## 2022-01-06 PROCEDURE — 86901 BLOOD TYPING SEROLOGIC RH(D): CPT

## 2022-01-06 PROCEDURE — 82565 ASSAY OF CREATININE: CPT

## 2022-01-06 PROCEDURE — 97166 OT EVAL MOD COMPLEX 45 MIN: CPT

## 2022-01-06 PROCEDURE — 82330 ASSAY OF CALCIUM: CPT

## 2022-01-06 PROCEDURE — C1769: CPT

## 2022-01-06 PROCEDURE — 80048 BASIC METABOLIC PNL TOTAL CA: CPT

## 2022-01-06 PROCEDURE — 84295 ASSAY OF SERUM SODIUM: CPT

## 2022-01-06 PROCEDURE — 86850 RBC ANTIBODY SCREEN: CPT

## 2022-01-06 PROCEDURE — 83605 ASSAY OF LACTIC ACID: CPT

## 2022-01-06 PROCEDURE — 82962 GLUCOSE BLOOD TEST: CPT

## 2022-01-06 PROCEDURE — 85014 HEMATOCRIT: CPT

## 2022-01-06 PROCEDURE — 85018 HEMOGLOBIN: CPT

## 2022-01-06 PROCEDURE — 84132 ASSAY OF SERUM POTASSIUM: CPT

## 2022-01-06 PROCEDURE — C1713: CPT

## 2022-01-06 PROCEDURE — 86900 BLOOD TYPING SEROLOGIC ABO: CPT

## 2022-01-06 PROCEDURE — 76000 FLUOROSCOPY <1 HR PHYS/QHP: CPT

## 2022-01-06 PROCEDURE — 99232 SBSQ HOSP IP/OBS MODERATE 35: CPT | Mod: GC

## 2022-01-06 RX ADMIN — NAFCILLIN 200 GRAM(S): 10 INJECTION, POWDER, FOR SOLUTION INTRAVENOUS at 06:53

## 2022-01-06 RX ADMIN — NAFCILLIN 200 GRAM(S): 10 INJECTION, POWDER, FOR SOLUTION INTRAVENOUS at 17:52

## 2022-01-06 RX ADMIN — Medication 1 TABLET(S): at 13:45

## 2022-01-06 RX ADMIN — Medication 100 MILLIGRAM(S): at 13:45

## 2022-01-06 RX ADMIN — Medication 100 MILLIGRAM(S): at 21:46

## 2022-01-06 RX ADMIN — NAFCILLIN 200 GRAM(S): 10 INJECTION, POWDER, FOR SOLUTION INTRAVENOUS at 13:45

## 2022-01-06 RX ADMIN — NAFCILLIN 200 GRAM(S): 10 INJECTION, POWDER, FOR SOLUTION INTRAVENOUS at 01:53

## 2022-01-06 RX ADMIN — NAFCILLIN 200 GRAM(S): 10 INJECTION, POWDER, FOR SOLUTION INTRAVENOUS at 21:47

## 2022-01-06 RX ADMIN — PANTOPRAZOLE SODIUM 40 MILLIGRAM(S): 20 TABLET, DELAYED RELEASE ORAL at 06:54

## 2022-01-06 RX ADMIN — Medication 650 MILLIGRAM(S): at 09:45

## 2022-01-06 RX ADMIN — NAFCILLIN 200 GRAM(S): 10 INJECTION, POWDER, FOR SOLUTION INTRAVENOUS at 11:00

## 2022-01-06 RX ADMIN — CHLORHEXIDINE GLUCONATE 1 APPLICATION(S): 213 SOLUTION TOPICAL at 11:14

## 2022-01-06 RX ADMIN — ENOXAPARIN SODIUM 40 MILLIGRAM(S): 100 INJECTION SUBCUTANEOUS at 11:11

## 2022-01-06 RX ADMIN — Medication 650 MILLIGRAM(S): at 09:15

## 2022-01-06 NOTE — PROGRESS NOTE ADULT - PROBLEM SELECTOR PLAN 2
- Bright red sputum x1 on 1/1  - Blood-tinged sputum, unclear if related to Robitussin use, however was given last night and has not had red sputum previously while on med  - CBC, coags, CXR unremarkable  - No hemoptysis 1/4  - Hemoptysis again 1/5 AM, but day team unable to assess as accidentally thrown out prior to seeing patient  - D/c Robitussin, c/w PRN Tessalon perles in case that may be regurgitated Robitussin

## 2022-01-06 NOTE — PROGRESS NOTE ADULT - PROBLEM SELECTOR PLAN 4
COVID+, patient on RA with dry cough. Not experiencing SOB, or CP.  - s/p Casirivimab/imdevimab  - Monitor symptoms   - Tessalon perles PRN  - Tylenol prn

## 2022-01-06 NOTE — PROGRESS NOTE ADULT - SUBJECTIVE AND OBJECTIVE BOX
Internal Medicine   Marilynjorid Goldman | PGY-1    OVERNIGHT EVENTS: Patient reported HA and pain in new peripheral site.      SUBJECTIVE: Patient was seen and examined at bedside this morning. Reports HA and pain in dorsum of L hand 2/2 new peripheral line placement. Denies any nausea/vomiting/diarrhea, headache, shortness of breath, abdominal pain or chest pain/palpitations. Patient responding appropriately to questions and able to make needs known.       MEDICATIONS  (STANDING):  acetaminophen 325 mG/butalbital 50 mG/caffeine 40 mG 1 Tablet(s) Oral once  benzonatate 100 milliGRAM(s) Oral every 8 hours  chlorhexidine 2% Cloths 1 Application(s) Topical daily  enoxaparin Injectable 40 milliGRAM(s) SubCutaneous daily  influenza  Vaccine (HIGH DOSE) 0.7 milliLiter(s) IntraMuscular once  nafcillin  IVPB 2 Gram(s) IV Intermittent every 4 hours  nafcillin  IVPB      pantoprazole    Tablet 40 milliGRAM(s) Oral before breakfast  senna 2 Tablet(s) Oral at bedtime    MEDICATIONS  (PRN):  acetaminophen     Tablet .. 650 milliGRAM(s) Oral every 6 hours PRN Temp greater or equal to 38C (100.4F), Mild Pain (1 - 3)  cyclobenzaprine 5 milliGRAM(s) Oral every 8 hours PRN Muscle Spasm        T(F): 98.6 (01-06-22 @ 06:52), Max: 99.6 (01-05-22 @ 23:18)  HR: 90 (01-06-22 @ 06:52) (85 - 90)  BP: 121/72 (01-06-22 @ 06:52) (116/64 - 122/63)  BP(mean): --  RR: 16 (01-06-22 @ 06:52) (16 - 16)  SpO2: 100% (01-06-22 @ 06:52) (100% - 100%)    PHYSICAL EXAM:     GENERAL: NAD, lying in bed comfortably.  HEAD:  Atraumatic, normocephalic.  CHEST/LUNG: CTAB. No rales, rhonchi, wheezing, or rubs. Unlabored respirations.  HEART: RRR, no M/R/G, normal S1/S2.  ABDOMEN: Soft, nontender, nondistended. Normoactive bowel sounds.  EXTREMITIES:  2+ peripheral pulses b/l. No clubbing, cyanosis, or edema.  MSK: No gross deformities noted.   NEURO:  AAOx3, no focal deficits.       LABS:                        13.4   7.53  )-----------( 497      ( 06 Jan 2022 07:25 )             40.9                   Creatinine Trend: 0.71<--, 0.77<--, 0.73<--, 0.72<--, 0.63<--, 0.72<--  I&O's Summary    BNP    RADIOLOGY & ADDITIONAL STUDIES:

## 2022-01-06 NOTE — PROGRESS NOTE ADULT - ATTENDING COMMENTS
Patient seen and examined. Case discussed with the medical team on rounds. I agree with the findings and the plan above.    66 year old gentlemen with no known PMH here w/ cervical spine OM w/ phlegmon, discitis s/p lami and PSF.  On nafcillin for MSSA. Tolerating abx well. Dispo challenging due to uninsured status.      Denies blood tinged sputum this morning.     -Continue nafcillin , plan for 8 week course per ID  -Pending PICC line placement pre-discharge  -Robitussin discontinued for now b/o occasional blood streaked sputum.   -per CM/SW  -Challenging dispo plan as patient is undocumented. Letter drafted to allow patient's son to come to the [place] on a medical visa. Will continue to follow latest developments.

## 2022-01-06 NOTE — PROGRESS NOTE ADULT - PROBLEM SELECTOR PLAN 1
Now s/p C3-C6 laminectomy w/ PSF by ortho spine.   As per CM note, family to privately pay for Abx  - Plan for PICC placement deferred until dispo issues resolve  - Continue Nafcillin 2g Q4hrs - patient will need this for 8 weeks per ID from (12/14/2021 - 2/21/2022)   - No fevers before placement of PICC   - As per ortho,  keep C-collar until 1/11/22

## 2022-01-07 PROCEDURE — 99232 SBSQ HOSP IP/OBS MODERATE 35: CPT | Mod: GC

## 2022-01-07 RX ORDER — NAFCILLIN 10 G/100ML
2 INJECTION, POWDER, FOR SOLUTION INTRAVENOUS
Qty: 540 | Refills: 0
Start: 2022-01-07 | End: 2022-02-20

## 2022-01-07 RX ADMIN — ENOXAPARIN SODIUM 40 MILLIGRAM(S): 100 INJECTION SUBCUTANEOUS at 13:18

## 2022-01-07 RX ADMIN — NAFCILLIN 200 GRAM(S): 10 INJECTION, POWDER, FOR SOLUTION INTRAVENOUS at 05:13

## 2022-01-07 RX ADMIN — PANTOPRAZOLE SODIUM 40 MILLIGRAM(S): 20 TABLET, DELAYED RELEASE ORAL at 05:14

## 2022-01-07 RX ADMIN — Medication 100 MILLIGRAM(S): at 13:19

## 2022-01-07 RX ADMIN — NAFCILLIN 200 GRAM(S): 10 INJECTION, POWDER, FOR SOLUTION INTRAVENOUS at 01:58

## 2022-01-07 RX ADMIN — NAFCILLIN 200 GRAM(S): 10 INJECTION, POWDER, FOR SOLUTION INTRAVENOUS at 13:18

## 2022-01-07 RX ADMIN — Medication 100 MILLIGRAM(S): at 21:47

## 2022-01-07 RX ADMIN — NAFCILLIN 200 GRAM(S): 10 INJECTION, POWDER, FOR SOLUTION INTRAVENOUS at 21:46

## 2022-01-07 RX ADMIN — Medication 100 MILLIGRAM(S): at 05:14

## 2022-01-07 RX ADMIN — CHLORHEXIDINE GLUCONATE 1 APPLICATION(S): 213 SOLUTION TOPICAL at 13:18

## 2022-01-07 NOTE — ED PROVIDER NOTE - PATIENT PORTAL LINK FT
At increased risk of exposure to COVID-19 virus
You can access the FollowMyHealth Patient Portal offered by [facility] by registering at the following website: [email or web address]. By joining [facility]’s FollowMyHealth portal, you will also be able to view your health information using other applications (apps) compatible with our system.

## 2022-01-07 NOTE — PROGRESS NOTE ADULT - SUBJECTIVE AND OBJECTIVE BOX
Internal Medicine   Marilyn Goldman | PGY-1    OVERNIGHT EVENTS:      SUBJECTIVE:       MEDICATIONS  (STANDING):  benzonatate 100 milliGRAM(s) Oral every 8 hours  chlorhexidine 2% Cloths 1 Application(s) Topical daily  enoxaparin Injectable 40 milliGRAM(s) SubCutaneous daily  influenza  Vaccine (HIGH DOSE) 0.7 milliLiter(s) IntraMuscular once  nafcillin  IVPB 2 Gram(s) IV Intermittent every 4 hours  nafcillin  IVPB      pantoprazole    Tablet 40 milliGRAM(s) Oral before breakfast  senna 2 Tablet(s) Oral at bedtime    MEDICATIONS  (PRN):  acetaminophen     Tablet .. 650 milliGRAM(s) Oral every 6 hours PRN Temp greater or equal to 38C (100.4F), Mild Pain (1 - 3)  cyclobenzaprine 5 milliGRAM(s) Oral every 8 hours PRN Muscle Spasm        T(F): 98.4 (01-07-22 @ 05:30), Max: 98.6 (01-06-22 @ 22:00)  HR: 79 (01-07-22 @ 05:30) (78 - 79)  BP: 121/67 (01-07-22 @ 05:30) (109/63 - 121/67)  BP(mean): --  RR: 18 (01-07-22 @ 05:30) (17 - 18)  SpO2: 99% (01-07-22 @ 05:30) (98% - 100%)    PHYSICAL EXAM:     GENERAL: NAD, lying in bed comfortably.  HEAD:  Atraumatic, normocephalic.  EYES: EOMI, PERRLA, conjunctiva and sclera clear, no nystagmus noted.  ENT: Moist mucous membranes,   NECK: Supple. No JVD. Trachea midline.  CHEST/LUNG: CTAB. No rales, rhonchi, wheezing, or rubs. Unlabored respirations.  HEART: RRR, no M/R/G, normal S1/S2.  ABDOMEN: Soft, nontender, nondistended, no organomegaly. Normoactive bowel sounds.  EXTREMITIES:  2+ peripheral pulses b/l, brisk capillary refill. No clubbing, cyanosis, or edema.  MSK: No gross deformities noted.   NEURO:  AAOx3, no focal deficits.   SKIN: No rashes or lesions.  PSYCH: Normal mood, affect.    TELEMETRY:    LABS:                        13.4   7.53  )-----------( 497      ( 06 Jan 2022 07:25 )             40.9                   Creatinine Trend: 0.71<--, 0.77<--, 0.73<--, 0.72<--, 0.63<--, 0.72<--  I&O's Summary    BNP    RADIOLOGY & ADDITIONAL STUDIES:             Internal Medicine   Marilynjordi Goldman | PGY-1    OVERNIGHT EVENTS: IV infiltrated while receiving nafcillin      SUBJECTIVE: Patient was seen and examined at bedside this morning. Continues to report cough; showed sputum, which has small amount of blood. Also complaining of discomfort experienced earlier when IV infiltrated during Abx tx. Denies any F/C, nausea/vomiting/diarrhea, headache, shortness of breath, abdominal pain or chest pain/palpitations. Patient responding appropriately to questions and able to make needs known.       MEDICATIONS  (STANDING):  benzonatate 100 milliGRAM(s) Oral every 8 hours  chlorhexidine 2% Cloths 1 Application(s) Topical daily  enoxaparin Injectable 40 milliGRAM(s) SubCutaneous daily  influenza  Vaccine (HIGH DOSE) 0.7 milliLiter(s) IntraMuscular once  nafcillin  IVPB 2 Gram(s) IV Intermittent every 4 hours  nafcillin  IVPB      pantoprazole    Tablet 40 milliGRAM(s) Oral before breakfast  senna 2 Tablet(s) Oral at bedtime    MEDICATIONS  (PRN):  acetaminophen     Tablet .. 650 milliGRAM(s) Oral every 6 hours PRN Temp greater or equal to 38C (100.4F), Mild Pain (1 - 3)  cyclobenzaprine 5 milliGRAM(s) Oral every 8 hours PRN Muscle Spasm        T(F): 98.4 (01-07-22 @ 05:30), Max: 98.6 (01-06-22 @ 22:00)  HR: 79 (01-07-22 @ 05:30) (78 - 79)  BP: 121/67 (01-07-22 @ 05:30) (109/63 - 121/67)  BP(mean): --  RR: 18 (01-07-22 @ 05:30) (17 - 18)  SpO2: 99% (01-07-22 @ 05:30) (98% - 100%)    PHYSICAL EXAM:     GENERAL: NAD, sitting up in bed comfortably.  HEAD:  Atraumatic, normocephalic.  CHEST/LUNG: CTAB. No rales, rhonchi, wheezing, or rubs. Unlabored respirations.  HEART: RRR, no M/R/G, normal S1/S2.  ABDOMEN: Soft, nontender, nondistended. Normoactive bowel sounds.  EXTREMITIES:  2+ peripheral pulses b/l, brisk capillary refill. No clubbing, cyanosis, or edema.  NEURO:  AAOx3, no focal deficits.   SKIN: No rashes or lesions.  PSYCH: Normal mood, affect.        LABS:                        13.4   7.53  )-----------( 497      ( 06 Jan 2022 07:25 )             40.9                   Creatinine Trend: 0.71<--, 0.77<--, 0.73<--, 0.72<--, 0.63<--, 0.72<--  I&O's Summary

## 2022-01-07 NOTE — PROVIDER CONTACT NOTE (OTHER) - SITUATION
Patient IV infiltrated during Antibiotic treatment, IV removed, patient refusing IV placement now, wants IV placed later

## 2022-01-07 NOTE — PROGRESS NOTE ADULT - ATTENDING COMMENTS
66 year old gentlemen with no known PMH here w/ cervical spine OM w/ phlegmon, discitis s/p lami and PSF.  On nafcillin for MSSA. Tolerating abx well. Dispo challenging due to uninsured status.  Continues to have on and off blood tinged sputum this morning in the morning.     -Continue nafcillin , plan for 8 week course per ID  -Pending PICC line placement pre-discharge  -Robitussin discontinued for now b/o occasional blood streaked sputum.   -Challenging dispo plan as patient is undocumented. Letter drafted to allow patient's son to come to the [place] on a medical visa. Will continue to follow latest developments per CM/SW

## 2022-01-08 LAB
A1C WITH ESTIMATED AVERAGE GLUCOSE RESULT: 5.6 % — SIGNIFICANT CHANGE UP (ref 4–5.6)
ESTIMATED AVERAGE GLUCOSE: 114 — SIGNIFICANT CHANGE UP

## 2022-01-08 PROCEDURE — 99232 SBSQ HOSP IP/OBS MODERATE 35: CPT | Mod: GC

## 2022-01-08 RX ADMIN — CYCLOBENZAPRINE HYDROCHLORIDE 5 MILLIGRAM(S): 10 TABLET, FILM COATED ORAL at 21:37

## 2022-01-08 RX ADMIN — ENOXAPARIN SODIUM 40 MILLIGRAM(S): 100 INJECTION SUBCUTANEOUS at 11:40

## 2022-01-08 RX ADMIN — NAFCILLIN 200 GRAM(S): 10 INJECTION, POWDER, FOR SOLUTION INTRAVENOUS at 01:40

## 2022-01-08 RX ADMIN — NAFCILLIN 200 GRAM(S): 10 INJECTION, POWDER, FOR SOLUTION INTRAVENOUS at 10:56

## 2022-01-08 RX ADMIN — Medication 100 MILLIGRAM(S): at 22:34

## 2022-01-08 RX ADMIN — Medication 100 MILLIGRAM(S): at 15:36

## 2022-01-08 RX ADMIN — NAFCILLIN 200 GRAM(S): 10 INJECTION, POWDER, FOR SOLUTION INTRAVENOUS at 05:18

## 2022-01-08 RX ADMIN — NAFCILLIN 200 GRAM(S): 10 INJECTION, POWDER, FOR SOLUTION INTRAVENOUS at 15:36

## 2022-01-08 RX ADMIN — CHLORHEXIDINE GLUCONATE 1 APPLICATION(S): 213 SOLUTION TOPICAL at 11:40

## 2022-01-08 RX ADMIN — Medication 100 MILLIGRAM(S): at 05:18

## 2022-01-08 RX ADMIN — NAFCILLIN 200 GRAM(S): 10 INJECTION, POWDER, FOR SOLUTION INTRAVENOUS at 18:42

## 2022-01-08 RX ADMIN — NAFCILLIN 200 GRAM(S): 10 INJECTION, POWDER, FOR SOLUTION INTRAVENOUS at 21:48

## 2022-01-08 RX ADMIN — PANTOPRAZOLE SODIUM 40 MILLIGRAM(S): 20 TABLET, DELAYED RELEASE ORAL at 06:52

## 2022-01-08 NOTE — PROGRESS NOTE ADULT - SUBJECTIVE AND OBJECTIVE BOX
Israel Vasquez, PGY-2    SUBJECTIVE:       MEDICATIONS  (STANDING):  benzonatate 100 milliGRAM(s) Oral every 8 hours  chlorhexidine 2% Cloths 1 Application(s) Topical daily  enoxaparin Injectable 40 milliGRAM(s) SubCutaneous daily  influenza  Vaccine (HIGH DOSE) 0.7 milliLiter(s) IntraMuscular once  nafcillin  IVPB 2 Gram(s) IV Intermittent every 4 hours  nafcillin  IVPB      pantoprazole    Tablet 40 milliGRAM(s) Oral before breakfast  senna 2 Tablet(s) Oral at bedtime    MEDICATIONS  (PRN):  acetaminophen     Tablet .. 650 milliGRAM(s) Oral every 6 hours PRN Temp greater or equal to 38C (100.4F), Mild Pain (1 - 3)  cyclobenzaprine 5 milliGRAM(s) Oral every 8 hours PRN Muscle Spasm        T(F): 98.4 (01-07-22 @ 05:30), Max: 98.6 (01-06-22 @ 22:00)  HR: 79 (01-07-22 @ 05:30) (78 - 79)  BP: 121/67 (01-07-22 @ 05:30) (109/63 - 121/67)  BP(mean): --  RR: 18 (01-07-22 @ 05:30) (17 - 18)  SpO2: 99% (01-07-22 @ 05:30) (98% - 100%)    PHYSICAL EXAM:     GENERAL: NAD, sitting up in bed comfortably.  HEAD:  Atraumatic, normocephalic.  CHEST/LUNG: CTAB. No rales, rhonchi, wheezing, or rubs. Unlabored respirations.  HEART: RRR, no M/R/G, normal S1/S2.  ABDOMEN: Soft, nontender, nondistended. Normoactive bowel sounds.  EXTREMITIES:  2+ peripheral pulses b/l, brisk capillary refill. No clubbing, cyanosis, or edema.  NEURO:  AAOx3, no focal deficits.   SKIN: No rashes or lesions.  PSYCH: Normal mood, affect.        LABS:                        13.4   7.53  )-----------( 497      ( 06 Jan 2022 07:25 )             40.9                   Creatinine Trend: 0.71<--, 0.77<--, 0.73<--, 0.72<--, 0.63<--, 0.72<--  I&O's Summary             Israel Vasquez, PGY-2    SUBJECTIVE: Pt notes last night while getting up felt a strain in his right side, but this feels better today, able to get up, walk around ect. Denies other complaints including no other back pain, headaches, fevers, chest pain, palpitations, SOB, n/v/d.       MEDICATIONS  (STANDING):  benzonatate 100 milliGRAM(s) Oral every 8 hours  chlorhexidine 2% Cloths 1 Application(s) Topical daily  enoxaparin Injectable 40 milliGRAM(s) SubCutaneous daily  influenza  Vaccine (HIGH DOSE) 0.7 milliLiter(s) IntraMuscular once  nafcillin  IVPB 2 Gram(s) IV Intermittent every 4 hours  nafcillin  IVPB      pantoprazole    Tablet 40 milliGRAM(s) Oral before breakfast  senna 2 Tablet(s) Oral at bedtime    MEDICATIONS  (PRN):  acetaminophen     Tablet .. 650 milliGRAM(s) Oral every 6 hours PRN Temp greater or equal to 38C (100.4F), Mild Pain (1 - 3)  cyclobenzaprine 5 milliGRAM(s) Oral every 8 hours PRN Muscle Spasm        T(F): 98.4 (01-07-22 @ 05:30), Max: 98.6 (01-06-22 @ 22:00)  HR: 79 (01-07-22 @ 05:30) (78 - 79)  BP: 121/67 (01-07-22 @ 05:30) (109/63 - 121/67)  BP(mean): --  RR: 18 (01-07-22 @ 05:30) (17 - 18)  SpO2: 99% (01-07-22 @ 05:30) (98% - 100%)    PHYSICAL EXAM:     GENERAL: NAD, sitting up in bed comfortably.  HEAD:  Atraumatic, normocephalic.  CHEST/LUNG: CTAB. No rales, rhonchi, wheezing, or rubs. Unlabored respirations.  HEART: RRR, no M/R/G, normal S1/S2.  ABDOMEN: Soft, nontender, nondistended. Normoactive bowel sounds.  EXTREMITIES:  2+ peripheral pulses b/l, brisk capillary refill. No clubbing, cyanosis, or edema.  NEURO:  AAOx3, no focal deficits. Able to stand up/walk unassisted. Mild ttp/soreness right side.   SKIN: No rashes or lesions.  PSYCH: Normal mood, affect.        LABS:                        13.4   7.53  )-----------( 497      ( 06 Jan 2022 07:25 )             40.9                   Creatinine Trend: 0.71<--, 0.77<--, 0.73<--, 0.72<--, 0.63<--, 0.72<--  I&O's Summary             Israel Vasquez, PGY-2    SUBJECTIVE: Pt notes last night while getting up felt a strain in his right side, but this feels better today, able to get up, walk around ect. Denies other complaints including no other back pain, headaches, fevers, chest pain, palpitations, SOB, n/v/d.     MEDICATIONS  (STANDING):  benzonatate 100 milliGRAM(s) Oral every 8 hours  chlorhexidine 2% Cloths 1 Application(s) Topical daily  enoxaparin Injectable 40 milliGRAM(s) SubCutaneous daily  influenza  Vaccine (HIGH DOSE) 0.7 milliLiter(s) IntraMuscular once  nafcillin  IVPB 2 Gram(s) IV Intermittent every 4 hours  nafcillin  IVPB      pantoprazole    Tablet 40 milliGRAM(s) Oral before breakfast  senna 2 Tablet(s) Oral at bedtime    MEDICATIONS  (PRN):  acetaminophen     Tablet .. 650 milliGRAM(s) Oral every 6 hours PRN Temp greater or equal to 38C (100.4F), Mild Pain (1 - 3)  cyclobenzaprine 5 milliGRAM(s) Oral every 8 hours PRN Muscle Spasm    T(F): 98.4 (01-07-22 @ 05:30), Max: 98.6 (01-06-22 @ 22:00)  HR: 79 (01-07-22 @ 05:30) (78 - 79)  BP: 121/67 (01-07-22 @ 05:30) (109/63 - 121/67)  BP(mean): --  RR: 18 (01-07-22 @ 05:30) (17 - 18)  SpO2: 99% (01-07-22 @ 05:30) (98% - 100%)    PHYSICAL EXAM:     GENERAL: NAD, sitting up in bed comfortably.  HEAD:  Atraumatic, normocephalic.  CHEST/LUNG: CTAB. No rales, rhonchi, wheezing, or rubs. Unlabored respirations.  HEART: RRR, no M/R/G, normal S1/S2.  ABDOMEN: Soft, nontender, nondistended. Normoactive bowel sounds.  EXTREMITIES:  2+ peripheral pulses b/l, brisk capillary refill. No clubbing, cyanosis, or edema.  NEURO:  AAOx3, no focal deficits. Able to stand up/walk unassisted. Mild ttp/soreness right side.   SKIN: No rashes or lesions.  PSYCH: Normal mood, affect.    LABS:                        13.4   7.53  )-----------( 497      ( 06 Jan 2022 07:25 )             40.9   Creatinine Trend: 0.71<--, 0.77<--, 0.73<--, 0.72<--, 0.63<--, 0.72<--  I&O's Summary

## 2022-01-08 NOTE — PROGRESS NOTE ADULT - ATTENDING COMMENTS
Patient seen and examined, care d/w HS2 covering resident.    Feeling well, OOB to chair, wearing c-collar, no neck pain, no CP, SOB, no new weakness    66M w/ cervical spine OM w/ phlegmon, discitis s/p lami and PSF.  On nafcillin for MSSA. Tolerating abx well. Dispo challenging due to uninsured status.    -Continue nafcillin , plan for 8 week course per ID  - Hba1c wnl, liberalize diet given BMI <18, nutrition eval  -Pending PICC line placement pre-discharge however unclear disposition   -Challenging dispo plan as patient is undocumented. Letter drafted to allow patient's son to come to the [place] on a medical visa. Will continue to follow latest developments per CM/SW

## 2022-01-09 PROCEDURE — 99232 SBSQ HOSP IP/OBS MODERATE 35: CPT | Mod: GC

## 2022-01-09 RX ADMIN — Medication 100 MILLIGRAM(S): at 23:03

## 2022-01-09 RX ADMIN — NAFCILLIN 200 GRAM(S): 10 INJECTION, POWDER, FOR SOLUTION INTRAVENOUS at 02:05

## 2022-01-09 RX ADMIN — PANTOPRAZOLE SODIUM 40 MILLIGRAM(S): 20 TABLET, DELAYED RELEASE ORAL at 06:17

## 2022-01-09 RX ADMIN — NAFCILLIN 200 GRAM(S): 10 INJECTION, POWDER, FOR SOLUTION INTRAVENOUS at 10:55

## 2022-01-09 RX ADMIN — NAFCILLIN 200 GRAM(S): 10 INJECTION, POWDER, FOR SOLUTION INTRAVENOUS at 23:04

## 2022-01-09 RX ADMIN — Medication 100 MILLIGRAM(S): at 13:21

## 2022-01-09 RX ADMIN — NAFCILLIN 200 GRAM(S): 10 INJECTION, POWDER, FOR SOLUTION INTRAVENOUS at 06:18

## 2022-01-09 RX ADMIN — ENOXAPARIN SODIUM 40 MILLIGRAM(S): 100 INJECTION SUBCUTANEOUS at 11:45

## 2022-01-09 RX ADMIN — Medication 100 MILLIGRAM(S): at 06:17

## 2022-01-09 RX ADMIN — CHLORHEXIDINE GLUCONATE 1 APPLICATION(S): 213 SOLUTION TOPICAL at 11:45

## 2022-01-09 RX ADMIN — NAFCILLIN 200 GRAM(S): 10 INJECTION, POWDER, FOR SOLUTION INTRAVENOUS at 18:54

## 2022-01-09 RX ADMIN — NAFCILLIN 200 GRAM(S): 10 INJECTION, POWDER, FOR SOLUTION INTRAVENOUS at 16:08

## 2022-01-09 NOTE — PROGRESS NOTE ADULT - SUBJECTIVE AND OBJECTIVE BOX
Internal Medicine   Marilyn Goldman | PGY-1    OVERNIGHT EVENTS: BOB      SUBJECTIVE: Patient was seen and examined at bedside this morning. Continues to complain about numbness in extremities. Also reports continued cough, but states he is "keeping an eye on it". Denies any F/C, nausea/vomiting/diarrhea, abdominal pain, headache, shortness of breath, or chest pain. Patient responding appropriately to questions and able to make needs known.       MEDICATIONS  (STANDING):  benzonatate 100 milliGRAM(s) Oral every 8 hours  chlorhexidine 2% Cloths 1 Application(s) Topical daily  enoxaparin Injectable 40 milliGRAM(s) SubCutaneous daily  influenza  Vaccine (HIGH DOSE) 0.7 milliLiter(s) IntraMuscular once  nafcillin  IVPB      nafcillin  IVPB 2 Gram(s) IV Intermittent every 4 hours  pantoprazole    Tablet 40 milliGRAM(s) Oral before breakfast  senna 2 Tablet(s) Oral at bedtime    MEDICATIONS  (PRN):  acetaminophen     Tablet .. 650 milliGRAM(s) Oral every 6 hours PRN Temp greater or equal to 38C (100.4F), Mild Pain (1 - 3)  cyclobenzaprine 5 milliGRAM(s) Oral every 8 hours PRN Muscle Spasm        T(F): 98.6 (01-09-22 @ 06:55), Max: 98.8 (01-08-22 @ 14:00)  HR: 72 (01-09-22 @ 06:55) (72 - 78)  BP: 114/74 (01-09-22 @ 06:55) (114/74 - 127/79)  BP(mean): --  RR: 18 (01-09-22 @ 06:55) (18 - 18)  SpO2: 97% (01-09-22 @ 06:55) (97% - 99%)    PHYSICAL EXAM:     GENERAL: NAD, lying in bed comfortably.  HEAD:  Atraumatic, normocephalic.  CHEST/LUNG: Inspiratory wheezing b/l UL. Unlabored respirations.  HEART: RRR, no M/R/G, normal S1/S2.  ABDOMEN: Soft, nontender, nondistended. Normoactive bowel sounds.  EXTREMITIES:  2+ peripheral pulses b/l. No clubbing, cyanosis, or edema.  NEURO:  AAOx3, no focal deficits.   PSYCH: Normal mood, affect.                        Creatinine Trend: 0.71<--, 0.77<--, 0.73<--, 0.72<--, 0.63<--, 0.72<--  I&O's Summary           Internal Medicine   Marilyn Goldman | PGY-1    OVERNIGHT EVENTS: BOB    SUBJECTIVE: Patient was seen and examined at bedside this morning. Continues to complain about numbness in extremities. Also reports continued cough, but states he is "keeping an eye on it". Denies any F/C, nausea/vomiting/diarrhea, abdominal pain, headache, shortness of breath, or chest pain. Patient responding appropriately to questions and able to make needs known.     MEDICATIONS  (STANDING):  benzonatate 100 milliGRAM(s) Oral every 8 hours  chlorhexidine 2% Cloths 1 Application(s) Topical daily  enoxaparin Injectable 40 milliGRAM(s) SubCutaneous daily  influenza  Vaccine (HIGH DOSE) 0.7 milliLiter(s) IntraMuscular once  nafcillin  IVPB 2 Gram(s) IV Intermittent every 4 hours  pantoprazole    Tablet 40 milliGRAM(s) Oral before breakfast  senna 2 Tablet(s) Oral at bedtime    MEDICATIONS  (PRN):  acetaminophen     Tablet .. 650 milliGRAM(s) Oral every 6 hours PRN Temp greater or equal to 38C (100.4F), Mild Pain (1 - 3)  cyclobenzaprine 5 milliGRAM(s) Oral every 8 hours PRN Muscle Spasm    T(F): 98.6 (01-09-22 @ 06:55), Max: 98.8 (01-08-22 @ 14:00)  HR: 72 (01-09-22 @ 06:55) (72 - 78)  BP: 114/74 (01-09-22 @ 06:55) (114/74 - 127/79)  RR: 18 (01-09-22 @ 06:55) (18 - 18)  SpO2: 97% (01-09-22 @ 06:55) (97% - 99%)    PHYSICAL EXAM:     GENERAL: NAD, lying in bed comfortably.  HEAD:  Atraumatic, normocephalic.  CHEST/LUNG: Inspiratory wheezing b/l UL. Unlabored respirations.  HEART: RRR, no M/R/G, normal S1/S2.  ABDOMEN: Soft, nontender, nondistended. Normoactive bowel sounds.  EXTREMITIES:  2+ peripheral pulses b/l. No clubbing, cyanosis, or edema.  NEURO:  AAOx3, no focal deficits.   PSYCH: Normal mood, affect.    Creatinine Trend: 0.71<--, 0.77<--, 0.73<--, 0.72<--, 0.63<--, 0.72<--  I&O's Summary

## 2022-01-09 NOTE — PROGRESS NOTE ADULT - PROBLEM SELECTOR PLAN 3
Pain on R upper ext since 1/1  Arm warm, tender. IV removed day prior   - Warm compress   - No complaints this AM COVID+, patient on RA with dry cough. Not experiencing SOB, or CP.  - s/p Casirivimab/imdevimab  - Monitor symptoms   - Tessalon perles PRN  - Tylenol prn

## 2022-01-09 NOTE — PROGRESS NOTE ADULT - PROBLEM SELECTOR PLAN 5
CT abd pelvis w/ L psoas abscess.  MRI showed psoas abscess bilaterally.  Bacteremia with Staph aureus    IR consulted for possible psoas abscess drainage - evaluated and found abscess to be too small to drain.   CT A/P 12/28 shows decreased size of psoas abscess   - Abx as above Diet: Consistent Carb  DVT: Lovenox   Dispo: Home pending PICC placement

## 2022-01-09 NOTE — PROGRESS NOTE ADULT - PROBLEM SELECTOR PLAN 4
COVID+, patient on RA with dry cough. Not experiencing SOB, or CP.  - s/p Casirivimab/imdevimab  - Monitor symptoms   - Tessalon perles PRN  - Tylenol prn CT abd pelvis w/ L psoas abscess.  MRI showed psoas abscess bilaterally.  Bacteremia with Staph aureus    IR consulted for possible psoas abscess drainage - evaluated and found abscess to be too small to drain.   CT A/P 12/28 shows decreased size of psoas abscess   - Abx as above

## 2022-01-09 NOTE — PROGRESS NOTE ADULT - PROBLEM SELECTOR PLAN 2
- Bright red sputum x1 on 1/1  - Blood-tinged sputum, unclear if related to Robitussin use, however was given last night and has not had red sputum previously while on med  - CBC, coags, CXR unremarkable  - No hemoptysis 1/4  - Hemoptysis again 1/5 AM, but day team unable to assess as accidentally thrown out prior to seeing patient  - D/c Robitussin, c/w PRN Tessalon perles in case that may be regurgitated Robitussin - Bright red sputum x1 on 1/1  - Blood-tinged sputum, unclear if related to Robitussin use, however was given last night and has not had red sputum previously while on med  - CBC, coags, CXR unremarkable  - No hemoptysis 1/4  - Hemoptysis again 1/5 AM, but day team unable to assess as accidentally thrown out prior to seeing patient  - D/c'ed Robitussin, c/w PRN Tessalon perles in case that may be regurgitated Robitussin

## 2022-01-09 NOTE — PROGRESS NOTE ADULT - ATTENDING COMMENTS
Patient seen and examined, care d/w HS2 covering resident.    Feeling well, OOB to chair, wearing c-collar, no neck pain, no CP, SOB, no new weakness    66M w/ cervical spine OM w/ phlegmon, discitis s/p lami and PSF.  On nafcillin for MSSA. Tolerating abx well. Dispo challenging due to uninsured status.    - continue nafcillin, plan for 8 week course per ID  - Hba1c wnl, liberalize diet given BMI <18, nutrition eval  - PICC line placement pre-discharge however unclear disposition   - Challenging dispo plan as patient is undocumented. Letter drafted to allow patient's son to come to the [place] on a medical visa. Will continue to follow latest developments per CM/SW

## 2022-01-10 LAB — SARS-COV-2 RNA SPEC QL NAA+PROBE: SIGNIFICANT CHANGE UP

## 2022-01-10 PROCEDURE — 99232 SBSQ HOSP IP/OBS MODERATE 35: CPT | Mod: GC

## 2022-01-10 RX ADMIN — Medication 100 MILLIGRAM(S): at 22:16

## 2022-01-10 RX ADMIN — PANTOPRAZOLE SODIUM 40 MILLIGRAM(S): 20 TABLET, DELAYED RELEASE ORAL at 06:46

## 2022-01-10 RX ADMIN — Medication 100 MILLIGRAM(S): at 06:48

## 2022-01-10 RX ADMIN — NAFCILLIN 200 GRAM(S): 10 INJECTION, POWDER, FOR SOLUTION INTRAVENOUS at 10:50

## 2022-01-10 RX ADMIN — NAFCILLIN 200 GRAM(S): 10 INJECTION, POWDER, FOR SOLUTION INTRAVENOUS at 22:16

## 2022-01-10 RX ADMIN — NAFCILLIN 200 GRAM(S): 10 INJECTION, POWDER, FOR SOLUTION INTRAVENOUS at 15:34

## 2022-01-10 RX ADMIN — CHLORHEXIDINE GLUCONATE 1 APPLICATION(S): 213 SOLUTION TOPICAL at 11:27

## 2022-01-10 RX ADMIN — NAFCILLIN 200 GRAM(S): 10 INJECTION, POWDER, FOR SOLUTION INTRAVENOUS at 06:47

## 2022-01-10 RX ADMIN — NAFCILLIN 200 GRAM(S): 10 INJECTION, POWDER, FOR SOLUTION INTRAVENOUS at 17:32

## 2022-01-10 RX ADMIN — ENOXAPARIN SODIUM 40 MILLIGRAM(S): 100 INJECTION SUBCUTANEOUS at 11:27

## 2022-01-10 RX ADMIN — NAFCILLIN 200 GRAM(S): 10 INJECTION, POWDER, FOR SOLUTION INTRAVENOUS at 02:11

## 2022-01-10 RX ADMIN — Medication 100 MILLIGRAM(S): at 14:26

## 2022-01-10 NOTE — CHART NOTE - NSCHARTNOTEFT_GEN_A_CORE
NUTRITION FOLLOW-UP:    66M w/o any known PMH presented s/p cervical laminectomy at [facility] for [facility] now on 8 weeks of Nafcillin, and COVID +. Patient to be discharged pending PICC placement.    Pt f/u as per consult for nutrition assessment.  Pt reports improved po intake of 80% at meal time with No GI distress (nausea/vomiting/diarrhea/constipation.).   Labs reviewed.  Noted improving A1c with CHO consistent diet.  Suggest change diet back to CHO consistent, Low na diet with Glucerna 2x daily (440kcals, 20g protein) to promote gradual wt gain.  Pt willing to drink it.     Weight:  NO new wts, previous wt- 128# (12/15/21)    Labs:  (1/6/22) A1c- 5.6%, (12/13/21) A1c- 6.3%    Current Diet: Diet, Regular (01-08-22 @ 09:20)    Skin- surgical incision, no edema as per RN flow sheet    Estimated needs:  [x] no change since previous assessment    Nutrition Diagnosis:  Resolved.    RD to Remain Available:    Additional Recommendations:   Rec change diet to CHO consistent, Low na diet with Glucerna 2x daily (440kcals, 20g protein  Monitor PO intake, weight trends, nutrition related lab values, BMs/GI distress, hydration status, skin integrity.       Justyna Reyes RDN #[id number]

## 2022-01-10 NOTE — PROGRESS NOTE ADULT - PROBLEM SELECTOR PLAN 2
Improved  - Bright red sputum x1 on 1/1  - Blood-tinged sputum, unclear if related to Robitussin use, however was given last night and has not had red sputum previously while on med  - CBC, coags, CXR unremarkable  - No hemoptysis 1/4  - Hemoptysis again 1/5 AM, but day team unable to assess as accidentally thrown out prior to seeing patient  - D/c'ed Robitussin, c/w PRN Tessalon perles in case that may be regurgitated Robitussin

## 2022-01-10 NOTE — PROGRESS NOTE ADULT - ATTENDING COMMENTS
Patient seen and examined. Case discussed with the medical team on rounds. I agree with the findings and the plan above.    66 year old gentlemen with no known PMH here w/ cervical spine OM w/ phlegmon, discitis s/p lami and PSF.  On nafcillin for MSSA. Tolerating abx well. Dispo challenging due to uninsured/undocumented status.  Continues to have on and off blood tinged sputum this morning in the morning.     -Continue nafcillin , plan for 8 week course per ID  -Pending PICC line placement pre-discharge  -Robitussin discontinued for now b/o occasional blood streaked sputum.   -Challenging dispo plan as patient is undocumented. Letter drafted to allow patient's son to come to the [place] on a medical visa. Will continue to follow latest developments per CM/SW.  Continue the rest of the work up and management as stated above.

## 2022-01-10 NOTE — PROGRESS NOTE ADULT - PROBLEM SELECTOR PLAN 5
Diet: Consistent Carb  DVT: Lovenox   Dispo: Home pending PICC placement; per CM, sister is now amenable to pt coming home pending negative covid test.

## 2022-01-11 PROCEDURE — 99232 SBSQ HOSP IP/OBS MODERATE 35: CPT | Mod: GC

## 2022-01-11 RX ADMIN — Medication 100 MILLIGRAM(S): at 21:56

## 2022-01-11 RX ADMIN — NAFCILLIN 200 GRAM(S): 10 INJECTION, POWDER, FOR SOLUTION INTRAVENOUS at 21:51

## 2022-01-11 RX ADMIN — NAFCILLIN 200 GRAM(S): 10 INJECTION, POWDER, FOR SOLUTION INTRAVENOUS at 01:24

## 2022-01-11 RX ADMIN — NAFCILLIN 200 GRAM(S): 10 INJECTION, POWDER, FOR SOLUTION INTRAVENOUS at 06:23

## 2022-01-11 RX ADMIN — NAFCILLIN 200 GRAM(S): 10 INJECTION, POWDER, FOR SOLUTION INTRAVENOUS at 09:51

## 2022-01-11 RX ADMIN — Medication 100 MILLIGRAM(S): at 13:09

## 2022-01-11 RX ADMIN — PANTOPRAZOLE SODIUM 40 MILLIGRAM(S): 20 TABLET, DELAYED RELEASE ORAL at 06:24

## 2022-01-11 RX ADMIN — Medication 100 MILLIGRAM(S): at 06:23

## 2022-01-11 RX ADMIN — CHLORHEXIDINE GLUCONATE 1 APPLICATION(S): 213 SOLUTION TOPICAL at 11:21

## 2022-01-11 RX ADMIN — NAFCILLIN 200 GRAM(S): 10 INJECTION, POWDER, FOR SOLUTION INTRAVENOUS at 13:06

## 2022-01-11 RX ADMIN — NAFCILLIN 200 GRAM(S): 10 INJECTION, POWDER, FOR SOLUTION INTRAVENOUS at 17:10

## 2022-01-11 RX ADMIN — ENOXAPARIN SODIUM 40 MILLIGRAM(S): 100 INJECTION SUBCUTANEOUS at 11:22

## 2022-01-11 NOTE — PROGRESS NOTE ADULT - ATTENDING COMMENTS
Patient seen and examined. Case discussed with the medical team on rounds. I agree with the findings and the plan above.    66 year old gentlemen with no known PMH here w/ cervical spine OM w/ phlegmon, discitis s/p lami and PSF. On nafcillin for MSSA. Tolerating abx well. Dispo challenging due to uninsured/undocumented status.      -Continue nafcillin , plan for 8 week course per ID  -Pending PICC line placement pre-discharge  -Robitussin discontinued for now b/o occasional blood streaked sputum.   -Challenging dispo plan as patient is undocumented. Letter drafted to allow patient's son to come to the [place] on a medical visa. Will continue to follow latest developments per CM/SW.  Continue the rest of the work up and management as stated above.

## 2022-01-11 NOTE — CHART NOTE - NSCHARTNOTEFT_GEN_A_CORE
PRE-INTERVENTIONAL RADIOLOGY PROCEDURE NOTE      Patient Age: 66    Patient Gender: M    Procedure: PICC Placement.     Approved by Ge Paul    Diagnosis/Indication: IV antibiotics    Ordering Attending Physician: Dr. Tellez    Pertinent Medical History: Osteomyelitis requiring nafcillin antibiotics until 2/21/22

## 2022-01-11 NOTE — PROGRESS NOTE ADULT - SUBJECTIVE AND OBJECTIVE BOX
Internal Medicine   Marilyn Goldman | PGY-1    OVERNIGHT EVENTS: BOB      SUBJECTIVE: Patient was seen and examined at bedside this morning. States cough continues to improve. Denies F/C, nausea/vomiting/diarrhea, headache, shortness of breath, abdominal pain or chest pain/palpitations. Patient is tearful today, expresses his gratitude towards the teams taking care of him. Responding appropriately to questions and able to make needs known.       MEDICATIONS  (STANDING):  acetaminophen 325 mG/butalbital 50 mG/caffeine 40 mG 1 Tablet(s) Oral once  benzonatate 100 milliGRAM(s) Oral every 8 hours  chlorhexidine 2% Cloths 1 Application(s) Topical daily  enoxaparin Injectable 40 milliGRAM(s) SubCutaneous daily  influenza  Vaccine (HIGH DOSE) 0.7 milliLiter(s) IntraMuscular once  nafcillin  IVPB      nafcillin  IVPB 2 Gram(s) IV Intermittent every 4 hours  pantoprazole    Tablet 40 milliGRAM(s) Oral before breakfast  senna 2 Tablet(s) Oral at bedtime    MEDICATIONS  (PRN):  acetaminophen     Tablet .. 650 milliGRAM(s) Oral every 6 hours PRN Temp greater or equal to 38C (100.4F), Mild Pain (1 - 3)  cyclobenzaprine 5 milliGRAM(s) Oral every 8 hours PRN Muscle Spasm        T(F): 97.3 (01-11-22 @ 06:00), Max: 98.9 (01-10-22 @ 22:00)  HR: 77 (01-11-22 @ 06:00) (72 - 77)  BP: 132/93 (01-11-22 @ 06:00) (122/67 - 132/93)  BP(mean): --  RR: 17 (01-11-22 @ 06:00) (16 - 18)  SpO2: 100% (01-11-22 @ 06:00) (99% - 100%)    PHYSICAL EXAM:     GENERAL: NAD, lying in bed comfortably.  HEAD:  Atraumatic, normocephalic.  CHEST/LUNG: CTAB. No rales, rhonchi, wheezing, or rubs. Unlabored respirations.  HEART: RRR, no M/R/G, normal S1/S2.  ABDOMEN: Soft, nontender, nondistended. Normoactive bowel sounds.  EXTREMITIES:  2+ peripheral pulses b/l. No clubbing, cyanosis, or edema.  NEURO:  AAOx3, no focal deficits.   SKIN: No rashes or lesions.  PSYCH: Normal mood, affect.    TELEMETRY:    LABS:                  Creatinine Trend: 0.71<--, 0.77<--, 0.73<--, 0.72<--, 0.63<--, 0.72<--  I&O's Summary    BNP    RADIOLOGY & ADDITIONAL STUDIES:

## 2022-01-11 NOTE — PROGRESS NOTE ADULT - PROBLEM SELECTOR PLAN 3
COVID+, patient on RA with dry cough. Not experiencing SOB, or CP.  - s/p Casirivimab/imdevimab  - Monitor symptoms   - Tessalon perles PRN  - Tylenol prn  - COVID negative 1/10/22

## 2022-01-12 PROCEDURE — 99232 SBSQ HOSP IP/OBS MODERATE 35: CPT | Mod: GC

## 2022-01-12 PROCEDURE — 36573 INSJ PICC RS&I 5 YR+: CPT

## 2022-01-12 RX ORDER — CHLORHEXIDINE GLUCONATE 213 G/1000ML
1 SOLUTION TOPICAL
Refills: 0 | Status: DISCONTINUED | OUTPATIENT
Start: 2022-01-12 | End: 2022-01-13

## 2022-01-12 RX ORDER — SODIUM CHLORIDE 9 MG/ML
10 INJECTION INTRAMUSCULAR; INTRAVENOUS; SUBCUTANEOUS
Refills: 0 | Status: DISCONTINUED | OUTPATIENT
Start: 2022-01-12 | End: 2022-01-13

## 2022-01-12 RX ADMIN — NAFCILLIN 200 GRAM(S): 10 INJECTION, POWDER, FOR SOLUTION INTRAVENOUS at 10:53

## 2022-01-12 RX ADMIN — ENOXAPARIN SODIUM 40 MILLIGRAM(S): 100 INJECTION SUBCUTANEOUS at 11:58

## 2022-01-12 RX ADMIN — NAFCILLIN 200 GRAM(S): 10 INJECTION, POWDER, FOR SOLUTION INTRAVENOUS at 14:47

## 2022-01-12 RX ADMIN — Medication 100 MILLIGRAM(S): at 14:49

## 2022-01-12 RX ADMIN — NAFCILLIN 200 GRAM(S): 10 INJECTION, POWDER, FOR SOLUTION INTRAVENOUS at 02:11

## 2022-01-12 RX ADMIN — NAFCILLIN 200 GRAM(S): 10 INJECTION, POWDER, FOR SOLUTION INTRAVENOUS at 22:08

## 2022-01-12 RX ADMIN — Medication 100 MILLIGRAM(S): at 06:05

## 2022-01-12 RX ADMIN — NAFCILLIN 200 GRAM(S): 10 INJECTION, POWDER, FOR SOLUTION INTRAVENOUS at 06:04

## 2022-01-12 RX ADMIN — Medication 100 MILLIGRAM(S): at 22:13

## 2022-01-12 RX ADMIN — NAFCILLIN 200 GRAM(S): 10 INJECTION, POWDER, FOR SOLUTION INTRAVENOUS at 17:15

## 2022-01-12 RX ADMIN — PANTOPRAZOLE SODIUM 40 MILLIGRAM(S): 20 TABLET, DELAYED RELEASE ORAL at 06:05

## 2022-01-12 RX ADMIN — CHLORHEXIDINE GLUCONATE 1 APPLICATION(S): 213 SOLUTION TOPICAL at 14:51

## 2022-01-12 RX ADMIN — CHLORHEXIDINE GLUCONATE 1 APPLICATION(S): 213 SOLUTION TOPICAL at 11:58

## 2022-01-12 NOTE — PROGRESS NOTE ADULT - PROBLEM SELECTOR PLAN 2
Resolved  - Bright red sputum x1 on 1/1  - Blood-tinged sputum, unclear if related to Robitussin use, however was given last night and has not had red sputum previously while on med  - CBC, coags, CXR unremarkable  - No hemoptysis 1/4  - Hemoptysis again 1/5 AM, but day team unable to assess as accidentally thrown out prior to seeing patient  - D/c'ed Robitussin, c/w PRN Tessalon perles in case that may be regurgitated Robitussin

## 2022-01-12 NOTE — PROGRESS NOTE ADULT - PROBLEM SELECTOR PLAN 1
Now s/p C3-C6 laminectomy w/ PSF by ortho spine.   As per CM note, family to privately pay for Abx  - Plan for PICC placement deferred until dispo issues resolve  - Continue Nafcillin 2g Q4hrs - patient will need this for 8 weeks per ID from (12/14/2021 - 2/21/2022)   - No fevers before placement of PICC   - C-collar removed  - PICC placement today

## 2022-01-12 NOTE — PROGRESS NOTE ADULT - SUBJECTIVE AND OBJECTIVE BOX
Internal Medicine   Marilyn Goldman | PGY-1    OVERNIGHT EVENTS: BOB      SUBJECTIVE: Patient was seen and examined at bedside this morning. He is doing well. States cough is continuing to improve. Denies F/C, nausea/vomiting/diarrhea, headache, shortness of breath, abdominal pain or chest pain/palpitations. Patient responding appropriately to questions and able to make needs known.       MEDICATIONS  (STANDING):  acetaminophen 325 mG/butalbital 50 mG/caffeine 40 mG 1 Tablet(s) Oral once  benzonatate 100 milliGRAM(s) Oral every 8 hours  chlorhexidine 2% Cloths 1 Application(s) Topical daily  enoxaparin Injectable 40 milliGRAM(s) SubCutaneous daily  influenza  Vaccine (HIGH DOSE) 0.7 milliLiter(s) IntraMuscular once  nafcillin  IVPB 2 Gram(s) IV Intermittent every 4 hours  nafcillin  IVPB      pantoprazole    Tablet 40 milliGRAM(s) Oral before breakfast  senna 2 Tablet(s) Oral at bedtime    MEDICATIONS  (PRN):  acetaminophen     Tablet .. 650 milliGRAM(s) Oral every 6 hours PRN Temp greater or equal to 38C (100.4F), Mild Pain (1 - 3)  cyclobenzaprine 5 milliGRAM(s) Oral every 8 hours PRN Muscle Spasm        T(F): 98.1 (01-12-22 @ 06:05), Max: 98.3 (01-11-22 @ 15:38)  HR: 71 (01-12-22 @ 06:05) (71 - 79)  BP: 124/73 (01-12-22 @ 06:05) (98/46 - 124/73)  BP(mean): --  RR: 18 (01-12-22 @ 06:05) (16 - 18)  SpO2: 100% (01-12-22 @ 06:05) (100% - 100%)    PHYSICAL EXAM:     GENERAL: NAD, walking around comfortably.  HEAD:  Atraumatic, normocephalic.  CHEST/LUNG: CTAB. No rales, rhonchi, wheezing, or rubs. Unlabored respirations.  HEART: RRR, no M/R/G, normal S1/S2.  ABDOMEN: Soft, nontender, nondistended. Normoactive bowel sounds.  EXTREMITIES:  2+ peripheral pulses b/l, brisk capillary refill. No clubbing, cyanosis, or edema.  NEURO:  AAOx3, no focal deficits.   SKIN: No rashes or lesions.  PSYCH: Normal mood, affect.                    Creatinine Trend: 0.71<--, 0.77<--, 0.73<--, 0.72<--, 0.63<--, 0.72<--  I&O's Summary    BNP    RADIOLOGY & ADDITIONAL STUDIES:

## 2022-01-12 NOTE — PROGRESS NOTE ADULT - ATTENDING COMMENTS
66 year old gentlemen with no known PMH here w/ cervical spine OM w/ phlegmon, discitis s/p lami and PSF. On nafcillin for MSSA. Tolerating abx well. Dispo challenging due to uninsured/undocumented status.      -Continue nafcillin , plan for 8 week course per ID  -Pending PICC line placement pre-discharge  -Challenging dispo plan as patient is undocumented. Letter drafted to allow patient's son to come to the [place] on a medical visa. Will continue to follow latest developments per CM/SW.  Continue the rest of the work up and management as stated above.

## 2022-01-13 ENCOUNTER — TRANSCRIPTION ENCOUNTER (OUTPATIENT)
Age: 67
End: 2022-01-13

## 2022-01-13 VITALS
TEMPERATURE: 98 F | SYSTOLIC BLOOD PRESSURE: 123 MMHG | RESPIRATION RATE: 18 BRPM | OXYGEN SATURATION: 100 % | HEART RATE: 92 BPM | DIASTOLIC BLOOD PRESSURE: 77 MMHG

## 2022-01-13 PROCEDURE — 99239 HOSP IP/OBS DSCHRG MGMT >30: CPT | Mod: GC

## 2022-01-13 RX ADMIN — INFLUENZA VIRUS VACCINE 0.7 MILLILITER(S): 15; 15; 15; 15 SUSPENSION INTRAMUSCULAR at 11:35

## 2022-01-13 RX ADMIN — NAFCILLIN 200 GRAM(S): 10 INJECTION, POWDER, FOR SOLUTION INTRAVENOUS at 03:09

## 2022-01-13 RX ADMIN — CHLORHEXIDINE GLUCONATE 1 APPLICATION(S): 213 SOLUTION TOPICAL at 11:45

## 2022-01-13 RX ADMIN — Medication 100 MILLIGRAM(S): at 06:30

## 2022-01-13 RX ADMIN — NAFCILLIN 200 GRAM(S): 10 INJECTION, POWDER, FOR SOLUTION INTRAVENOUS at 10:10

## 2022-01-13 RX ADMIN — NAFCILLIN 200 GRAM(S): 10 INJECTION, POWDER, FOR SOLUTION INTRAVENOUS at 06:27

## 2022-01-13 RX ADMIN — PANTOPRAZOLE SODIUM 40 MILLIGRAM(S): 20 TABLET, DELAYED RELEASE ORAL at 06:30

## 2022-01-13 RX ADMIN — CHLORHEXIDINE GLUCONATE 1 APPLICATION(S): 213 SOLUTION TOPICAL at 06:29

## 2022-01-13 NOTE — PROGRESS NOTE ADULT - SUBJECTIVE AND OBJECTIVE BOX
Internal Medicine   Marilyn Goldman | PGY-1    OVERNIGHT EVENTS:      SUBJECTIVE:       MEDICATIONS  (STANDING):  acetaminophen 325 mG/butalbital 50 mG/caffeine 40 mG 1 Tablet(s) Oral once  benzonatate 100 milliGRAM(s) Oral every 8 hours  chlorhexidine 2% Cloths 1 Application(s) Topical daily  chlorhexidine 4% Liquid 1 Application(s) Topical <User Schedule>  enoxaparin Injectable 40 milliGRAM(s) SubCutaneous daily  influenza  Vaccine (HIGH DOSE) 0.7 milliLiter(s) IntraMuscular once  nafcillin  IVPB      nafcillin  IVPB 2 Gram(s) IV Intermittent every 4 hours  pantoprazole    Tablet 40 milliGRAM(s) Oral before breakfast  senna 2 Tablet(s) Oral at bedtime    MEDICATIONS  (PRN):  acetaminophen     Tablet .. 650 milliGRAM(s) Oral every 6 hours PRN Temp greater or equal to 38C (100.4F), Mild Pain (1 - 3)  cyclobenzaprine 5 milliGRAM(s) Oral every 8 hours PRN Muscle Spasm  sodium chloride 0.9% lock flush 10 milliLiter(s) IV Push every 1 hour PRN Pre/post blood products, medications, blood draw, and to maintain line patency        T(F): 97.9 (01-13-22 @ 06:26), Max: 98.7 (01-12-22 @ 13:05)  HR: 92 (01-13-22 @ 06:26) (78 - 92)  BP: 123/77 (01-13-22 @ 06:26) (123/77 - 141/86)  BP(mean): 92 (01-12-22 @ 13:25) (92 - 99)  RR: 18 (01-13-22 @ 06:26) (16 - 18)  SpO2: 100% (01-13-22 @ 06:26) (100% - 100%)    PHYSICAL EXAM:     GENERAL: NAD, lying in bed comfortably.  HEAD:  Atraumatic, normocephalic.  EYES: EOMI, PERRLA, conjunctiva and sclera clear, no nystagmus noted.  ENT: Moist mucous membranes,   NECK: Supple. No JVD. Trachea midline.  CHEST/LUNG: CTAB. No rales, rhonchi, wheezing, or rubs. Unlabored respirations.  HEART: RRR, no M/R/G, normal S1/S2.  ABDOMEN: Soft, nontender, nondistended, no organomegaly. Normoactive bowel sounds.  EXTREMITIES:  2+ peripheral pulses b/l, brisk capillary refill. No clubbing, cyanosis, or edema.  MSK: No gross deformities noted.   NEURO:  AAOx3, no focal deficits.   SKIN: No rashes or lesions.  PSYCH: Normal mood, affect.    TELEMETRY:    LABS:                  Creatinine Trend: 0.71<--, 0.77<--, 0.73<--, 0.72<--, 0.63<--, 0.72<--  I&O's Summary    12 Jan 2022 07:01  -  13 Jan 2022 07:00  --------------------------------------------------------  IN: 0 mL / OUT: 1000 mL / NET: -1000 mL      BNP    RADIOLOGY & ADDITIONAL STUDIES:             Internal Medicine   Marilyn Goldman | PGY-1    OVERNIGHT EVENTS: BOB      SUBJECTIVE: Patient was seen and examined at bedside this morning. Reports improved cough. Denies SOB, CP/palpitations, nausea/vomiting/diarrhea, headache, abdominal pain or dizziness. Patient responding appropriately to questions and able to make needs known.       MEDICATIONS  (STANDING):  acetaminophen 325 mG/butalbital 50 mG/caffeine 40 mG 1 Tablet(s) Oral once  benzonatate 100 milliGRAM(s) Oral every 8 hours  chlorhexidine 2% Cloths 1 Application(s) Topical daily  chlorhexidine 4% Liquid 1 Application(s) Topical <User Schedule>  enoxaparin Injectable 40 milliGRAM(s) SubCutaneous daily  influenza  Vaccine (HIGH DOSE) 0.7 milliLiter(s) IntraMuscular once  nafcillin  IVPB      nafcillin  IVPB 2 Gram(s) IV Intermittent every 4 hours  pantoprazole    Tablet 40 milliGRAM(s) Oral before breakfast  senna 2 Tablet(s) Oral at bedtime    MEDICATIONS  (PRN):  acetaminophen     Tablet .. 650 milliGRAM(s) Oral every 6 hours PRN Temp greater or equal to 38C (100.4F), Mild Pain (1 - 3)  cyclobenzaprine 5 milliGRAM(s) Oral every 8 hours PRN Muscle Spasm  sodium chloride 0.9% lock flush 10 milliLiter(s) IV Push every 1 hour PRN Pre/post blood products, medications, blood draw, and to maintain line patency        T(F): 97.9 (01-13-22 @ 06:26), Max: 98.7 (01-12-22 @ 13:05)  HR: 92 (01-13-22 @ 06:26) (78 - 92)  BP: 123/77 (01-13-22 @ 06:26) (123/77 - 141/86)  BP(mean): 92 (01-12-22 @ 13:25) (92 - 99)  RR: 18 (01-13-22 @ 06:26) (16 - 18)  SpO2: 100% (01-13-22 @ 06:26) (100% - 100%)    PHYSICAL EXAM:     GENERAL: NAD, lying in bed comfortably.  HEAD:  Atraumatic, normocephalic.  CHEST/LUNG: CTAB. No rales, rhonchi, wheezing, or rubs. Unlabored respirations.  HEART: RRR, no M/R/G, normal S1/S2.  ABDOMEN: Soft, nontender, nondistended. Normoactive bowel sounds.  EXTREMITIES:  2+ peripheral pulses b/l. No clubbing, cyanosis, or edema.  MSK: No gross deformities noted.   NEURO:  AAOx3, no focal deficits.   SKIN: Dry, especially proximal LLE.  PSYCH: Normal mood, affect.        LABS:                  Creatinine Trend: 0.71<--, 0.77<--, 0.73<--, 0.72<--, 0.63<--, 0.72<--  I&O's Summary    12 Jan 2022 07:01  -  13 Jan 2022 07:00  --------------------------------------------------------  IN: 0 mL / OUT: 1000 mL / NET: -1000 mL      BNP    RADIOLOGY & ADDITIONAL STUDIES:

## 2022-01-13 NOTE — DISCHARGE NOTE NURSING/CASE MANAGEMENT/SOCIAL WORK - PATIENT PORTAL LINK FT
You can access the FollowMyHealth Patient Portal offered by [facility] by registering at the following website: [email or web address]. By joining [employer]s FollowMyHealth portal, you will also be able to view your health information using other applications (apps) compatible with our system.

## 2022-01-13 NOTE — PROGRESS NOTE ADULT - PROVIDER SPECIALTY LIST ADULT
Infectious Disease
Infectious Disease
Internal Medicine
Orthopedics
Infectious Disease
Internal Medicine
Orthopedics
Rehab Medicine
Rehab Medicine
Infectious Disease
Infectious Disease
Internal Medicine
Infectious Disease

## 2022-01-13 NOTE — PROGRESS NOTE ADULT - PROBLEM SELECTOR PLAN 5
Diet: Consistent Carb  DVT: Lovenox   Dispo: Home pending PICC placement; per CM, sister is now amenable to pt coming home pending negative covid test. Diet: Consistent Carb  DVT: Lovenox   Dispo: Home

## 2022-01-13 NOTE — PROGRESS NOTE ADULT - ATTENDING SUPERVISION STATEMENT
Resident

## 2022-01-13 NOTE — PROGRESS NOTE ADULT - REASON FOR ADMISSION
Cervical and Lumbar Osteomyelitis

## 2022-01-13 NOTE — PROGRESS NOTE ADULT - PROBLEM SELECTOR PLAN 1
Now s/p C3-C6 laminectomy w/ PSF by ortho spine.   As per CM note, family to privately pay for Abx  - Plan for PICC placement deferred until dispo issues resolve  - Continue Nafcillin 2g Q4hrs - patient will need this for 8 weeks per ID from (12/14/2021 - 2/21/2022)   - No fevers before placement of PICC   - As per ortho, keep C-collar until 1/11/22 Now s/p C3-C6 laminectomy w/ PSF by ortho spine.   As per CM note, family to privately pay for Abx  - Continue Nafcillin 2g Q4hrs - patient will need this for 8 weeks per ID from (12/14/2021 - 2/21/2022)   - s/p PICC placement 1/12/22   - C-collar removed

## 2022-01-13 NOTE — PROGRESS NOTE ADULT - PROBLEM SELECTOR PLAN 2
Improved  - Bright red sputum x1 on 1/1  - Blood-tinged sputum, unclear if related to Robitussin use, however was given last night and has not had red sputum previously while on med  - CBC, coags, CXR unremarkable  - No hemoptysis 1/4  - Hemoptysis again 1/5 AM, but day team unable to assess as accidentally thrown out prior to seeing patient  - D/c'ed Robitussin, c/w PRN Tessalon perles in case that may be regurgitated Robitussin Resolved  - Bright red sputum x1 on 1/1  - Blood-tinged sputum, unclear if related to Robitussin use, however was given last night and has not had red sputum previously while on med  - CBC, coags, CXR unremarkable  - No hemoptysis 1/4  - Hemoptysis again 1/5 AM, but day team unable to assess as accidentally thrown out prior to seeing patient  - D/c'ed Robitussin, c/w PRN Tessalon perles in case that may be regurgitated Robitussin

## 2022-01-13 NOTE — DISCHARGE NOTE NURSING/CASE MANAGEMENT/SOCIAL WORK - NSDCVIVACCINE_GEN_ALL_CORE_FT
No Vaccines Administered. influenza, high-dose, quadrivalent; 13-Jan-2022 11:35; Sandra Mock (RN); [employer]; [id number] (Exp. Date: 30-Jun-2022); IntraMuscular; Deltoid Right.; 0.7 milliLiter(s); VIS (VIS Published: 06-Aug-2021, VIS Presented: 13-Jan-2022);

## 2022-01-13 NOTE — DISCHARGE NOTE NURSING/CASE MANAGEMENT/SOCIAL WORK - NSDCPEFALRISK_GEN_ALL_CORE
For information on Fall & Injury Prevention, visit: [email or web address] OR  [email or web address] OR  https://www.cdc.gov/steadi/patient.html

## 2022-01-13 NOTE — PROGRESS NOTE ADULT - ATTENDING COMMENTS
66 year old gentlemen with no known PMH here w/ cervical spine OM w/ phlegmon, discitis s/p lami and PSF. On nafcillin for MSSA. Tolerating abx well. Dispo challenging due to uninsured/undocumented status.      -Continue nafcillin , plan for 8 week course per ID  -Pending PICC line placement pre-discharge  -Challenging dispo plan as patient is undocumented. Letter drafted to allow patient's son to come to the [place] on a medical visa. Will continue to follow latest developments per CM/SW.  Continue the rest of the work up and management as stated above. 66 year old gentlemen with no known PMH here w/ cervical spine OM w/ phlegmon, discitis s/p lami and PSF. On nafcillin for MSSA. Tolerating abx well. Dispo challenging due to uninsured/undocumented status.      -Continue nafcillin , plan for 8 week course per ID  -S/P PICC line placement pre-discharge  -Challenging dispo plan as patient is undocumented. Letter drafted to allow patient's son to come to the [place] on a medical visa. Will continue to follow latest developments per CM/SW.  Continue the rest of the work up and management as stated above.

## 2022-01-13 NOTE — DISCHARGE NOTE NURSING/CASE MANAGEMENT/SOCIAL WORK - NSDCFUADDAPPT_GEN_ALL_CORE_FT
Please follow up with your primary care doctor within 1-2 weeks after being discharged from the hospital for continued medical care. You can follow-up at the [address] [place] [address] clinic.  Please call: [phone]    Please follow up with Dr. Lara (orthopedic surgery) for continued medical care from your surgery. You can schedule an appointment by calling [phone].  It is recommended that you remain with the C-collar until 1/11/22.   The information is provided.     Please follow up with the infectious diseases clinic 5 weeks after discharge (week of 2/7/22). You can schedule an appointment by calling [phone].

## 2022-01-24 PROBLEM — R73.03 PREDIABETES: Chronic | Status: ACTIVE | Noted: 2021-12-16

## 2022-01-28 PROBLEM — Z00.00 ENCOUNTER FOR PREVENTIVE HEALTH EXAMINATION: Status: ACTIVE | Noted: 2022-01-28

## 2022-02-10 ENCOUNTER — APPOINTMENT (OUTPATIENT)
Dept: INTERNAL MEDICINE | Facility: CLINIC | Age: 67
End: 2022-02-10

## 2022-02-10 VITALS — TEMPERATURE: 97.3 F

## 2022-02-15 NOTE — PROGRESS NOTE ADULT - PROBLEM SELECTOR PLAN 5
Seen for COVID testing at [facility] per  [facility]    O2 Sat = 95%  (Children under 12 do not require O2 sat)    Specimen sent to:   labs for testing    PUI form faxed to [facility] if positive   Diet: Consistent Carb  DVT: Lovenox   Dispo: Home pending PICC placement; per CM, sister is now amenable to pt coming home pending negative covid test.

## 2022-02-16 ENCOUNTER — APPOINTMENT (OUTPATIENT)
Dept: ORTHOPEDIC SURGERY | Facility: CLINIC | Age: 67
End: 2022-02-16
Payer: MEDICAID

## 2022-02-16 VITALS — WEIGHT: 125 LBS | HEIGHT: 69 IN | BODY MASS INDEX: 18.51 KG/M2

## 2022-02-16 PROCEDURE — 99214 OFFICE O/P EST MOD 30 MIN: CPT

## 2022-02-16 PROCEDURE — 72040 X-RAY EXAM NECK SPINE 2-3 VW: CPT

## 2022-02-16 NOTE — ASSESSMENT
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 He has done very well since surgery. His gait has significantly improved. He is no longer dealing with the bowel bladder dysfunction that he had prior to surgery. Overall he is very pleased with his recovery to date as MI. At this point I will have him follow-up in 6 weeks for repeat clinical evaluation. I encouraged him to follow-up sooner if he has any new or worsening symptoms. I did make sure the patient and the patient's family have my direct contact information if there are any issues I encouraged him to reach out to me at any point

## 2022-02-16 NOTE — PHYSICAL EXAM
[de-identified] : Cervical Physical Exam\par \par Gait - Normal\par \par Station - Normal\par \par Sagittal balance - Normal \par \par Compensatory mechanism? - None\par \par Horizontal gaze - Maintained\par \par \par Shoulder Exam - Normal\par \par Spurling´s - None\par \par Wrist Pulses -2+ radial/ulnar\par \par Foot Pulses -2+ DP/PT\par \par Cervical range of motion - Normal\par \par Sensation \par C5-T1 sensation intact to light touch bilaterally\par \par L1-S1 sensation intact to light touch bilaterally\par \par Motor\par \par \par 	Deltoid	Biceps	Triceps	WF	WE	IO	\par Right	5/5	5/5	5/5	5/5	5/5	5/5	5/5\par Left	5/5	5/5	5/5	5/5	5/5	5/5	5/5\par \par \par 	IP	Quad	HS	TA	Gastroc	EHL\par Right	5/5	5/5	5/5	5/5	5/5	5/5\par Left	5/5	5/5	5/5	5/5	5/5	5/5\par \par Cervical incision is healed [de-identified] : Cervical radiographs\par Hardware in appropriate position\par No acute complication

## 2022-02-16 NOTE — HISTORY OF PRESENT ILLNESS
[de-identified] : This is a 66-year-old male that is now approximately 2 months out from C3-C6 posterior spinal fusion with decompression. This was for cervical spondylotic myelopathy. He has done very well since surgery. He did have to deal with a Covid diagnosis which she is recovering well from. He denies any severe radicular type pain. He did have significant issues with gait and ambulation prior to surgery. This has largely resolved. He does have some numbness over his right thumb but this is minor. He denies any bowel bladder issues. He denies any saddle anesthesia. This is largely resolved as compared to prior to surgery.

## 2022-02-24 ENCOUNTER — APPOINTMENT (OUTPATIENT)
Dept: INFECTIOUS DISEASE | Facility: CLINIC | Age: 67
End: 2022-02-24
Payer: MEDICAID

## 2022-02-24 ENCOUNTER — OUTPATIENT (OUTPATIENT)
Dept: OUTPATIENT SERVICES | Facility: HOSPITAL | Age: 67
LOS: 1 days | End: 2022-02-24
Payer: MEDICAID

## 2022-02-24 VITALS
OXYGEN SATURATION: 98 % | BODY MASS INDEX: 19.7 KG/M2 | TEMPERATURE: 98.4 F | WEIGHT: 133 LBS | SYSTOLIC BLOOD PRESSURE: 216 MMHG | DIASTOLIC BLOOD PRESSURE: 120 MMHG | HEART RATE: 78 BPM | HEIGHT: 69 IN

## 2022-02-24 DIAGNOSIS — Z98.890 OTHER SPECIFIED POSTPROCEDURAL STATES: Chronic | ICD-10-CM

## 2022-02-24 DIAGNOSIS — Z95.828 PRESENCE OF OTHER VASCULAR IMPLANTS AND GRAFTS: ICD-10-CM

## 2022-02-24 PROCEDURE — 99214 OFFICE O/P EST MOD 30 MIN: CPT

## 2022-02-24 PROCEDURE — G0463: CPT

## 2022-03-02 PROBLEM — Z95.828 S/P PICC CENTRAL LINE PLACEMENT: Status: ACTIVE | Noted: 2022-03-02

## 2022-03-02 NOTE — HISTORY OF PRESENT ILLNESS
[FreeTextEntry1] : 66 M with T2DM and HTN found to have ESBL UTI and MSSA bacteremia and started on cefpodoxime, presented for worsening back pain a/w weakness, found to have b/l psoas abscesses and cervical and lumbar osteomyelitis (at [facility])\par \par Has PICC in place, s/p Nafcillin antibiotic course\par Patient gives history that he was done with antibiotic on 2/21, but IV company did not remove the PICC line\par Otherwise well, no fever, no chills, no new complaints\par Generally improved\par \par Here patient has markedly high BP, but no symptoms, no signs headache, dizziness, stroke symptom--he was advised to proceed to ED immediately following visit; if patient develops above symptoms prior to ED, then instructed to call 911 (his nephew is present with him to assist, and understands instructions)\par \par During today's visit, I examined PICC which appears clean with no erythema, removed dressing, removed sutures and then removed PICC 47cm smoothly, and on eval it was found intact, no break

## 2022-03-02 NOTE — PHYSICAL EXAM
[General Appearance - Alert] : alert [General Appearance - In No Acute Distress] : in no acute distress [General Appearance - Well Nourished] : well nourished [General Appearance - Well-Appearing] : healthy appearing [Sclera] : the sclera and conjunctiva were normal [Outer Ear] : the ears and nose were normal in appearance [Neck Appearance] : the appearance of the neck was normal [Respiration, Rhythm And Depth] : normal respiratory rhythm and effort [Heart Rate And Rhythm] : heart rate was normal and rhythm regular [Heart Sounds] : normal S1 and S2 [Edema] : there was no peripheral edema [Bowel Sounds] : normal bowel sounds [Abdomen Soft] : soft [No Palpable Adenopathy] : no palpable adenopathy [Musculoskeletal - Swelling] : no joint swelling [] : no rash [Sensation] : the sensory exam was normal to light touch and pinprick [Motor Exam] : the motor exam was normal [Oriented To Time, Place, And Person] : oriented to person, place, and time [Affect] : the affect was normal

## 2022-03-02 NOTE — DATA REVIEWED
[FreeTextEntry1] : 12/2021 BCX MSSA\par \par 12/12/21 BCX neg\par \par 12/28\par \par IMPRESSION:\par Left psoas abscess decreased in size.

## 2022-03-02 NOTE — ASSESSMENT
[FreeTextEntry1] : 66 M with T2DM and HTN with ED visit 2 days prior to presentation, found to have ESBL UTI and MSSA bacteremia Found to have b/l psoas abscesses and cervical and lumbar osteomyelitis\par LE weakness, MSSA bacteremia, cervical and lumbar osteomyelitis, psoas abscess, s/p C3-C6 laminectomy 12/14\par Now on outpatient IV nafcillin, complete 2/21/22\par PICC in place, I removed it (description earlier in note)\par Check surveillance BCXs as antibiotic have now been stopped\par Overall, MSSA bacteremia, HTN, OM/psoas absces\par - S/p course Nafcillin\par - DCed PICC line (as above)\par - Note urgently elevated BP, advised proceed to ED after visit completed; if symptoms needs to call 911\par - Check BCXs x 2\par - RTC PRN\par

## 2022-03-08 DIAGNOSIS — B97.89 OTHER VIRAL AGENTS AS THE CAUSE OF DISEASES CLASSIFIED ELSEWHERE: ICD-10-CM

## 2022-03-09 ENCOUNTER — OUTPATIENT (OUTPATIENT)
Dept: OUTPATIENT SERVICES | Facility: HOSPITAL | Age: 67
LOS: 1 days | End: 2022-03-09
Payer: SELF-PAY

## 2022-03-09 ENCOUNTER — APPOINTMENT (OUTPATIENT)
Age: 67
End: 2022-03-09
Payer: COMMERCIAL

## 2022-03-09 VITALS
HEART RATE: 65 BPM | DIASTOLIC BLOOD PRESSURE: 100 MMHG | HEIGHT: 69 IN | SYSTOLIC BLOOD PRESSURE: 160 MMHG | BODY MASS INDEX: 18.96 KG/M2 | WEIGHT: 128 LBS | OXYGEN SATURATION: 99 %

## 2022-03-09 VITALS — SYSTOLIC BLOOD PRESSURE: 190 MMHG | DIASTOLIC BLOOD PRESSURE: 92 MMHG

## 2022-03-09 DIAGNOSIS — I10 ESSENTIAL (PRIMARY) HYPERTENSION: ICD-10-CM

## 2022-03-09 DIAGNOSIS — R78.81 BACTEREMIA: ICD-10-CM

## 2022-03-09 DIAGNOSIS — Z98.890 OTHER SPECIFIED POSTPROCEDURAL STATES: Chronic | ICD-10-CM

## 2022-03-09 PROCEDURE — ZZZZZ: CPT

## 2022-03-09 PROCEDURE — G0463: CPT

## 2022-03-09 RX ORDER — AMLODIPINE BESYLATE 5 MG/1
5 TABLET ORAL DAILY
Qty: 90 | Refills: 1 | Status: ACTIVE | COMMUNITY
Start: 2022-03-09 | End: 1900-01-01

## 2022-03-09 NOTE — HISTORY OF PRESENT ILLNESS
[Post-hospitalization from ___ Hospital] : Post-hospitalization from [unfilled] Hospital [FreeTextEntry2] : On Nov 15 (almost 1 month prior to admission), upon waking up in the morning, he felt the back pain and leg weakness that he couldn't walk. He denied any trauma or injury to the area. He felt the severe pain all over his spine from his neck down to his lumbar area and shooting pain toward his legs. He never had such pain episodes like this before. He denied bladder/ bowel incontinence. He endorsed increased urinary frequency. No N/V/D, fever, chills, chest pain, abdominal pain, recent surgery or sick contacts. \par BCx were positive for MSSA Bacteremia. MRI significant for cervical and lumbar osteomyelitis w/ developing phelgmon/abscess formation. ID consulted, initially patient was on Vanc/Zosyn, however patient was de-escalated to Nafcillin given MSSA bacteremia. Orthopedics was consulted. Patient underwent C3-6 laminectomy w/ PSF at [facility] w/ Dr. Lara. No reported complications. Patient returned to [facility] for further management. \par \par Patient currently feels well and only residual sypmtom is mild R hand weakness when opening jars. Otherwise patient denies any loss of sensation or weakness. Gait has returned to normal. Mild occasional neck pain but otherwise no residual symptoms. Patient has been consistently hypertensive since d/c w/ SBP>200 at ID and 170/92 by my measurement. EKG reviewed form hospitalization showed ? LVH. Patient denies chest pain, abdominal pain, N/V/d, headache, visiion problems, dysuria, hematuria, hematochezia. \par \par \par

## 2022-03-10 DIAGNOSIS — Z95.828 PRESENCE OF OTHER VASCULAR IMPLANTS AND GRAFTS: ICD-10-CM

## 2022-03-10 DIAGNOSIS — R78.81 BACTEREMIA: ICD-10-CM

## 2022-03-10 DIAGNOSIS — I10 ESSENTIAL (PRIMARY) HYPERTENSION: ICD-10-CM

## 2022-03-14 LAB
BACTERIA BLD CULT: NORMAL
BACTERIA BLD CULT: NORMAL

## 2022-03-16 DIAGNOSIS — R78.81 BACTEREMIA: ICD-10-CM

## 2022-04-15 ENCOUNTER — APPOINTMENT (OUTPATIENT)
Dept: ORTHOPEDIC SURGERY | Facility: CLINIC | Age: 67
End: 2022-04-15

## 2022-04-22 ENCOUNTER — APPOINTMENT (OUTPATIENT)
Dept: ORTHOPEDIC SURGERY | Facility: CLINIC | Age: 67
End: 2022-04-22
Payer: COMMERCIAL

## 2022-04-22 VITALS
HEART RATE: 65 BPM | BODY MASS INDEX: 18.96 KG/M2 | HEIGHT: 69 IN | WEIGHT: 128 LBS | SYSTOLIC BLOOD PRESSURE: 185 MMHG | OXYGEN SATURATION: 99 % | DIASTOLIC BLOOD PRESSURE: 115 MMHG

## 2022-04-22 DIAGNOSIS — M54.2 CERVICALGIA: ICD-10-CM

## 2022-04-22 PROCEDURE — 72050 X-RAY EXAM NECK SPINE 4/5VWS: CPT

## 2022-04-22 PROCEDURE — 99214 OFFICE O/P EST MOD 30 MIN: CPT

## 2022-04-22 NOTE — ASSESSMENT
[FreeTextEntry1] : I had a lengthy discussion with the patient regards to his treatment plan and diagnosis.  He has done very well after surgery.  At this point I would encourage him to start home exercises focused on his neck.  I would also like him to start physical therapy focused on his neck.  I do think a portion of his posterior shoulder pain is coming from his shoulders and I have prescribed him specific shoulder home exercises as well.  I will have him follow-up in 3 months for repeat clinical evaluation.  I encouraged him to follow-up sooner if he has any new or worsening symptoms

## 2022-04-22 NOTE — HISTORY OF PRESENT ILLNESS
[de-identified] : This is a 66-year-old male who is now approximately 4 months out from C3-C6 posterior spinal fusion decompression.  Overall he is done very well.  He is back to walking with a normal gait.  He denies any severe radicular type pain.  He does have occasional neck stiffness.  Other than that he is very pleased with her course and his operative results.\par \par 02/16/22\par This is a 66-year-old male that is now approximately 2 months out from C3-C6 posterior spinal fusion with decompression. This was for cervical spondylotic myelopathy. He has done very well since surgery. He did have to deal with a Covid diagnosis which she is recovering well from. He denies any severe radicular type pain. He did have significant issues with gait and ambulation prior to surgery. This has largely resolved. He does have some numbness over his right thumb but this is minor. He denies any bowel bladder issues. He denies any saddle anesthesia. This is largely resolved as compared to prior to surgery.

## 2022-04-22 NOTE — PHYSICAL EXAM
[de-identified] : Cervical Physical Exam\par \par Gait - Normal\par \par Station - Normal\par \par Sagittal balance - Normal \par \par Compensatory mechanism? - None\par \par Horizontal gaze - Maintained\par \par \par Shoulder Exam - Normal\par \par Spurling´s - None\par \par Wrist Pulses -2+ radial/ulnar\par \par Foot Pulses -2+ DP/PT\par \par \par Sensation \par C5-T1 sensation intact to light touch bilaterally\par \par L1-S1 sensation intact to light touch bilaterally\par \par Motor\par \par \par 	Deltoid	Biceps	Triceps	WF	WE	IO	\par Right	5/5	5/5	5/5	5/5	5/5	5/5	5/5\par Left	5/5	5/5	5/5	5/5	5/5	5/5	5/5\par \par \par 	IP	Quad	HS	TA	Gastroc	EHL\par Right	5/5	5/5	5/5	5/5	5/5	5/5\par Left	5/5	5/5	5/5	5/5	5/5	5/5\par \par Cervical incision is healed [de-identified] : Cervical radiographs\par Hardware in appropriate position\par No instability on flexion-extension radiographs

## 2022-08-17 NOTE — PROGRESS NOTE ADULT - SUBJECTIVE AND OBJECTIVE BOX
- - - ORQUIDEA MCCARTNEY 66y [id number]    Patient is a 66y old  Male who presents with a chief complaint of Cervical and Lumbar Osteomyelitis (23 Dec 2021 06:47)      Follow Up/CC:  ID following for COVID    Interval History/ROS: fever+    Allergies    No Known Allergies    Intolerances        ANTIMICROBIALS:  nafcillin  IVPB    nafcillin  IVPB 2 every 4 hours      MEDICATIONS  (STANDING):  chlorhexidine 2% Cloths 1 Application(s) Topical daily  nafcillin  IVPB      nafcillin  IVPB 2 Gram(s) IV Intermittent every 4 hours  pantoprazole    Tablet 40 milliGRAM(s) Oral before breakfast  senna 2 Tablet(s) Oral at bedtime    MEDICATIONS  (PRN):  acetaminophen     Tablet .. 650 milliGRAM(s) Oral every 6 hours PRN Temp greater or equal to 38C (100.4F), Mild Pain (1 - 3)  cyclobenzaprine 5 milliGRAM(s) Oral every 8 hours PRN Muscle Spasm  guaiFENesin Oral Liquid (Sugar-Free) 100 milliGRAM(s) Oral every 6 hours PRN Cough        Vital Signs Last 24 Hrs  T(C): 37.4 (23 Dec 2021 05:38), Max: 38.1 (22 Dec 2021 21:10)  T(F): 99.4 (23 Dec 2021 05:38), Max: 100.6 (22 Dec 2021 21:10)  HR: 68 (23 Dec 2021 05:38) (68 - 80)  BP: 131/81 (23 Dec 2021 05:38) (110/69 - 142/80)  BP(mean): --  RR: 16 (23 Dec 2021 05:38) (16 - 17)  SpO2: 100% (23 Dec 2021 05:38) (100% - 100%)    CBC Full  -  ( 23 Dec 2021 11:52 )  WBC Count : 4.45 K/uL  RBC Count : 3.71 M/uL  Hemoglobin : 11.3 g/dL  Hematocrit : 32.9 %  Platelet Count - Automated : 754 K/uL  Mean Cell Volume : 88.7 fL  Mean Cell Hemoglobin : 30.5 pg  Mean Cell Hemoglobin Concentration : 34.3 gm/dL  Auto Neutrophil # : 2.31 K/uL  Auto Lymphocyte # : 1.57 K/uL  Auto Monocyte # : 0.44 K/uL  Auto Eosinophil # : 0.10 K/uL  Auto Basophil # : 0.01 K/uL  Auto Neutrophil % : 52.0 %  Auto Lymphocyte % : 35.3 %  Auto Monocyte % : 9.9 %  Auto Eosinophil % : 2.2 %  Auto Basophil % : 0.2 %    12-22    135  |  96<L>  |  12  ----------------------------<  88  3.4<L>   |  24  |  0.95    Ca    8.2<L>      22 Dec 2021 06:44  Phos  2.6     12-22  Mg     1.90     12-22    TPro  6.9  /  Alb  2.6<L>  /  TBili  0.2  /  DBili  x   /  AST  16  /  ALT  16  /  AlkPhos  74  12-22    LIVER FUNCTIONS - ( 22 Dec 2021 06:44 )  Alb: 2.6 g/dL / Pro: 6.9 g/dL / ALK PHOS: 74 U/L / ALT: 16 U/L / AST: 16 U/L / GGT: x               MICROBIOLOGY:  .Blood Blood-Peripheral  12-20-21   No growth to date.  --  --      .Blood Blood-Peripheral  12-20-21   No growth to date.  --  --      .Blood Blood-Venous  12-12-21   No Growth Final  --  --      .Blood Blood-Venous  12-12-21   No Growth Final  --  --      Clean Catch Clean Catch (Midstream)  12-10-21   >100,000 CFU/ml Escherichia coli ESBL  --  Escherichia coli ESBL      .Blood Blood-Peripheral  12-10-21   Growth in aerobic bottle: Staphylococcus aureus  See previous culture [id number]  Growth in anaerobic bottle: Staphylococcus epidermidis  Coag Negative Staphylococcus  Single set isolate, possible contaminant. Contact  Microbiology if susceptibility testing clinically  indicated.  --    Growth in aerobic and anaerobic bottles: Gram Positive Cocci in Clusters      .Blood Blood-Venous  12-10-21   Growth in aerobic and anaerobic bottles: Staphylococcus aureus  ***Blood Panel PCR results on this specimen are available  approximately 3 hours after the Gram stain result.***  Gram stain, PCR, and/or culture results may not always  correspond dueto difference in methodologies.  ************************************************************  This PCR assay was performed by multiplex PCR. This  Assay tests for 66 bacterial and resistance gene targets.  Please refer to the [facility] test directory  at [email or web address] for details.  --  Blood Culture PCR  Staphylococcus aureus      Rapid RVP Result: Detected (12-20 @ 12:58)          RADIOLOGY    < from: CT Cervical Spine No Cont (12.14.21 @ 03:30) >  IMPRESSION: Disc space narrowing endplates chronic changes and erosions   of the endplates are seen at the C4-5 level which is secondary to   patient's known discitis/osteomyelitis.    Degenerative changes as described above.    < end of copied text >   ORQUIDEA MCCARTNEY 66y [id number]    Patient is a 66y old  Male who presents with a chief complaint of Cervical and Lumbar Osteomyelitis (23 Dec 2021 06:47)      Follow Up/CC:  ID following for COVID    Interval History/ROS: fever+, cough+    Allergies    No Known Allergies    Intolerances        ANTIMICROBIALS:  nafcillin  IVPB    nafcillin  IVPB 2 every 4 hours      MEDICATIONS  (STANDING):  chlorhexidine 2% Cloths 1 Application(s) Topical daily  nafcillin  IVPB      nafcillin  IVPB 2 Gram(s) IV Intermittent every 4 hours  pantoprazole    Tablet 40 milliGRAM(s) Oral before breakfast  senna 2 Tablet(s) Oral at bedtime    MEDICATIONS  (PRN):  acetaminophen     Tablet .. 650 milliGRAM(s) Oral every 6 hours PRN Temp greater or equal to 38C (100.4F), Mild Pain (1 - 3)  cyclobenzaprine 5 milliGRAM(s) Oral every 8 hours PRN Muscle Spasm  guaiFENesin Oral Liquid (Sugar-Free) 100 milliGRAM(s) Oral every 6 hours PRN Cough        Vital Signs Last 24 Hrs  T(C): 37.4 (23 Dec 2021 05:38), Max: 38.1 (22 Dec 2021 21:10)  T(F): 99.4 (23 Dec 2021 05:38), Max: 100.6 (22 Dec 2021 21:10)  HR: 68 (23 Dec 2021 05:38) (68 - 80)  BP: 131/81 (23 Dec 2021 05:38) (110/69 - 142/80)  BP(mean): --  RR: 16 (23 Dec 2021 05:38) (16 - 17)  SpO2: 100% (23 Dec 2021 05:38) (100% - 100%)    CBC Full  -  ( 23 Dec 2021 11:52 )  WBC Count : 4.45 K/uL  RBC Count : 3.71 M/uL  Hemoglobin : 11.3 g/dL  Hematocrit : 32.9 %  Platelet Count - Automated : 754 K/uL  Mean Cell Volume : 88.7 fL  Mean Cell Hemoglobin : 30.5 pg  Mean Cell Hemoglobin Concentration : 34.3 gm/dL  Auto Neutrophil # : 2.31 K/uL  Auto Lymphocyte # : 1.57 K/uL  Auto Monocyte # : 0.44 K/uL  Auto Eosinophil # : 0.10 K/uL  Auto Basophil # : 0.01 K/uL  Auto Neutrophil % : 52.0 %  Auto Lymphocyte % : 35.3 %  Auto Monocyte % : 9.9 %  Auto Eosinophil % : 2.2 %  Auto Basophil % : 0.2 %    12-22    135  |  96<L>  |  12  ----------------------------<  88  3.4<L>   |  24  |  0.95    Ca    8.2<L>      22 Dec 2021 06:44  Phos  2.6     12-22  Mg     1.90     12-22    TPro  6.9  /  Alb  2.6<L>  /  TBili  0.2  /  DBili  x   /  AST  16  /  ALT  16  /  AlkPhos  74  12-22    LIVER FUNCTIONS - ( 22 Dec 2021 06:44 )  Alb: 2.6 g/dL / Pro: 6.9 g/dL / ALK PHOS: 74 U/L / ALT: 16 U/L / AST: 16 U/L / GGT: x               MICROBIOLOGY:  .Blood Blood-Peripheral  12-20-21   No growth to date.  --  --      .Blood Blood-Peripheral  12-20-21   No growth to date.  --  --      .Blood Blood-Venous  12-12-21   No Growth Final  --  --      .Blood Blood-Venous  12-12-21   No Growth Final  --  --      Clean Catch Clean Catch (Midstream)  12-10-21   >100,000 CFU/ml Escherichia coli ESBL  --  Escherichia coli ESBL      .Blood Blood-Peripheral  12-10-21   Growth in aerobic bottle: Staphylococcus aureus  See previous culture [id number]  Growth in anaerobic bottle: Staphylococcus epidermidis  Coag Negative Staphylococcus  Single set isolate, possible contaminant. Contact  Microbiology if susceptibility testing clinically  indicated.  --    Growth in aerobic and anaerobic bottles: Gram Positive Cocci in Clusters      .Blood Blood-Venous  12-10-21   Growth in aerobic and anaerobic bottles: Staphylococcus aureus  ***Blood Panel PCR results on this specimen are available  approximately 3 hours after the Gram stain result.***  Gram stain, PCR, and/or culture results may not always  correspond dueto difference in methodologies.  ************************************************************  This PCR assay was performed by multiplex PCR. This  Assay tests for 66 bacterial and resistance gene targets.  Please refer to the [facility] test directory  at [email or web address] for details.  --  Blood Culture PCR  Staphylococcus aureus      Rapid RVP Result: Detected (12-20 @ 12:58)          RADIOLOGY    < from: CT Cervical Spine No Cont (12.14.21 @ 03:30) >  IMPRESSION: Disc space narrowing endplates chronic changes and erosions   of the endplates are seen at the C4-5 level which is secondary to   patient's known discitis/osteomyelitis.    Degenerative changes as described above.    < end of copied text >

## 2023-02-09 NOTE — DISCHARGE NOTE NURSING/CASE MANAGEMENT/SOCIAL WORK - NSDCCRTYPESERV_GEN_ALL_CORE_FT
Ambulette scheduled  for 11:30 am Normal vision: sees adequately in most situations; can see medication labels, newsprint

## 2023-07-24 NOTE — ED PROVIDER NOTE - CHIEF COMPLAINT
The patient is a 66y Male complaining of back pain general. Melolabial Transposition Flap Text: The defect edges were debeveled with a #15 scalpel blade.  Given the location of the defect and the proximity to free margins a melolabial flap was deemed most appropriate.  Using a sterile surgical marker, an appropriate melolabial transposition flap was drawn incorporating the defect.    The area thus outlined was incised deep to adipose tissue with a #15 scalpel blade.  The skin margins were undermined to an appropriate distance in all directions utilizing iris scissors.

## 2023-10-17 NOTE — ED ADULT NURSE NOTE - CHIEF COMPLAINT QUOTE
from [facility] 10 pm last pm. states not feeling well x past mo. increased back pains,decreased appetite,problems urinating,defecating. denies ch pains,duff breathing.  reports " feels tired with no energy"  fs [phone]

## 2024-09-11 NOTE — PROGRESS NOTE ADULT - PROBLEM SELECTOR PLAN 2
-cont abx  -repeat imaging in 6-8 weeks to reassess or earlier if worsening symptoms How Severe Are Your Spot(S)?: mild Have Your Spot(S) Been Treated In The Past?: has not been treated Hpi Title: Evaluation of Skin Lesions Additional History: Pt has Fluorouracil at home from previous dermatology clinic and was told to use it every 3 days. He discontinued use 1 week ago

## 2024-11-07 NOTE — DIETITIAN INITIAL EVALUATION ADULT. - PROBLEM SELECTOR PROBLEM 1
1049 MET WITH PATIENT AT BEDSIDE TO DISCUSS DISCHARGE PLAN. PATIENT RESTING IN BED AND DENIES NEEDS.  DISCHARGE PLAN REMAINS HOME WITH [facility]-ACCEPTED. AND HOME O2 AT 3L N/C.   Acute osteomyelitis